# Patient Record
Sex: FEMALE | Race: WHITE | URBAN - METROPOLITAN AREA
[De-identification: names, ages, dates, MRNs, and addresses within clinical notes are randomized per-mention and may not be internally consistent; named-entity substitution may affect disease eponyms.]

---

## 2017-01-26 ENCOUNTER — OFFICE VISIT (OUTPATIENT)
Dept: FAMILY MEDICINE CLINIC | Age: 62
End: 2017-01-26

## 2017-01-26 VITALS
SYSTOLIC BLOOD PRESSURE: 160 MMHG | DIASTOLIC BLOOD PRESSURE: 80 MMHG | HEART RATE: 96 BPM | BODY MASS INDEX: 45.18 KG/M2 | OXYGEN SATURATION: 92 % | HEIGHT: 63 IN | WEIGHT: 255 LBS

## 2017-01-26 DIAGNOSIS — J44.1 ACUTE EXACERBATION OF CHRONIC OBSTRUCTIVE PULMONARY DISEASE (COPD) (HCC): Primary | ICD-10-CM

## 2017-01-26 PROCEDURE — 99212 OFFICE O/P EST SF 10 MIN: CPT | Performed by: NURSE PRACTITIONER

## 2017-01-26 RX ORDER — CLARITHROMYCIN 500 MG/1
500 TABLET, COATED ORAL 2 TIMES DAILY
Qty: 20 TABLET | Refills: 0 | Status: SHIPPED | OUTPATIENT
Start: 2017-01-26 | End: 2017-02-05

## 2017-01-26 ASSESSMENT — ENCOUNTER SYMPTOMS
EYE REDNESS: 0
COUGH: 1
CHEST TIGHTNESS: 1
EYE DISCHARGE: 0
VOICE CHANGE: 1
SHORTNESS OF BREATH: 1
SORE THROAT: 1
SINUS PRESSURE: 1

## 2017-01-27 RX ORDER — PROMETHAZINE HYDROCHLORIDE AND CODEINE PHOSPHATE 6.25; 1 MG/5ML; MG/5ML
5 SYRUP ORAL EVERY 4 HOURS PRN
Qty: 140 ML | Refills: 0 | Status: SHIPPED | OUTPATIENT
Start: 2017-01-27 | End: 2017-02-01 | Stop reason: SDUPTHER

## 2017-02-01 ENCOUNTER — TELEPHONE (OUTPATIENT)
Dept: FAMILY MEDICINE CLINIC | Age: 62
End: 2017-02-01

## 2017-02-01 RX ORDER — PROMETHAZINE HYDROCHLORIDE AND CODEINE PHOSPHATE 6.25; 1 MG/5ML; MG/5ML
5 SYRUP ORAL EVERY 4 HOURS PRN
Qty: 140 ML | Refills: 0 | Status: SHIPPED | OUTPATIENT
Start: 2017-02-01 | End: 2017-02-08

## 2017-02-01 RX ORDER — METHYLPREDNISOLONE 4 MG/1
TABLET ORAL
Qty: 1 KIT | Refills: 0 | Status: SHIPPED | OUTPATIENT
Start: 2017-02-01 | End: 2017-02-07

## 2017-02-10 RX ORDER — CLOTRIMAZOLE 10 MG/1
10 LOZENGE ORAL; TOPICAL
Qty: 35 TABLET | Refills: 0 | Status: SHIPPED | OUTPATIENT
Start: 2017-02-10 | End: 2017-02-17

## 2017-02-13 ENCOUNTER — TELEPHONE (OUTPATIENT)
Dept: FAMILY MEDICINE CLINIC | Age: 62
End: 2017-02-13

## 2017-02-13 RX ORDER — SULFAMETHOXAZOLE AND TRIMETHOPRIM 800; 160 MG/1; MG/1
1 TABLET ORAL 2 TIMES DAILY
Qty: 20 TABLET | Refills: 0 | Status: SHIPPED | OUTPATIENT
Start: 2017-02-13 | End: 2017-02-23

## 2017-02-22 ENCOUNTER — TELEPHONE (OUTPATIENT)
Dept: FAMILY MEDICINE CLINIC | Age: 62
End: 2017-02-22

## 2017-03-29 RX ORDER — VARENICLINE TARTRATE 1 MG/1
1 TABLET, FILM COATED ORAL 2 TIMES DAILY
Qty: 60 TABLET | Refills: 3 | Status: SHIPPED | OUTPATIENT
Start: 2017-03-29 | End: 2018-12-05 | Stop reason: ALTCHOICE

## 2018-06-14 ENCOUNTER — TELEPHONE (OUTPATIENT)
Dept: PRIMARY CARE CLINIC | Age: 63
End: 2018-06-14

## 2018-06-29 ENCOUNTER — HOSPITAL ENCOUNTER (OUTPATIENT)
Dept: WOMENS IMAGING | Age: 63
Discharge: HOME | End: 2018-06-29
Attending: FAMILY MEDICINE | Admitting: FAMILY MEDICINE

## 2018-06-29 DIAGNOSIS — Z12.31 VISIT FOR SCREENING MAMMOGRAM: ICD-10-CM

## 2018-07-05 DIAGNOSIS — R92.8 ABNORMAL MAMMOGRAM: Primary | ICD-10-CM

## 2018-07-06 ENCOUNTER — TELEPHONE (OUTPATIENT)
Dept: PRIMARY CARE CLINIC | Age: 63
End: 2018-07-06

## 2018-07-06 DIAGNOSIS — R92.8 ABNORMAL MAMMOGRAM: Primary | ICD-10-CM

## 2018-07-27 ENCOUNTER — HOSPITAL ENCOUNTER (OUTPATIENT)
Dept: WOMENS IMAGING | Age: 63
Discharge: HOME | End: 2018-07-27
Admitting: NURSE PRACTITIONER

## 2018-07-27 DIAGNOSIS — R92.8 ABNORMAL MAMMOGRAM: ICD-10-CM

## 2018-07-27 DIAGNOSIS — R92.8 OTHER ABNORMAL AND INCONCLUSIVE FINDINGS ON DIAGNOSTIC IMAGING OF BREAST: ICD-10-CM

## 2018-07-27 DIAGNOSIS — R92.8 ABNORMAL MAMMOGRAM OF RIGHT BREAST: Primary | ICD-10-CM

## 2018-08-03 ENCOUNTER — HOSPITAL ENCOUNTER (OUTPATIENT)
Dept: ULTRASOUND IMAGING | Age: 63
Discharge: HOME | End: 2018-08-03
Attending: NURSE PRACTITIONER | Admitting: NURSE PRACTITIONER

## 2018-08-03 VITALS — OXYGEN SATURATION: 96 % | HEART RATE: 92 BPM | SYSTOLIC BLOOD PRESSURE: 173 MMHG | DIASTOLIC BLOOD PRESSURE: 60 MMHG

## 2018-08-03 DIAGNOSIS — R92.8 ABNORMAL MAMMOGRAM OF RIGHT BREAST: ICD-10-CM

## 2018-08-03 DIAGNOSIS — N63.0 BREAST LUMP: ICD-10-CM

## 2018-08-03 DIAGNOSIS — R92.8 ABNORMAL MAMMOGRAM: ICD-10-CM

## 2018-08-03 NOTE — PROGRESS NOTES
Breast Biopsy Dressing Change    NAME:  Haleigh Colunga  YOB: 1955  GENDER: female  MEDICAL RECORD NUMBER:  [id number]  EPISODE DATE:  8/3/2018    Hemostasis achieved:  pressure     Biopsy site cleansed with alcohol      Dressing applied to right breast biopsy site. Steri-strips applied along with small amount of bacitracin-neomycin polymixin then Coverderm     Response to treatment:  Well tolerated by patient. Post Procedure Vitals: Procedure well tolerated. Pt vitals stable upon discharge. BP: 173/60 HR92 SPO2: 96 Ice pack placed over biopsy site. Pt given post-biopsy education and all questions answered.       Electronically signed by Samira Nelson RN on 8/3/2018 at 11:20 AM

## 2018-08-03 NOTE — PROGRESS NOTES
Pt instructed to take blood pressure this afternoon at home or local pharmacy to ensure it has lowered. If not, she is instructed to call her PCP and report the numbers. Pt agrees.

## 2018-08-06 DIAGNOSIS — R89.7 ABNORMAL BREAST BIOPSY: Primary | ICD-10-CM

## 2018-08-07 ENCOUNTER — CASE MANAGEMENT (OUTPATIENT)
Dept: CASE MANAGEMENT | Age: 63
End: 2018-08-07

## 2018-08-08 ENCOUNTER — PRE-PROCEDURE TELEPHONE (OUTPATIENT)
Dept: WOMENS IMAGING | Age: 63
End: 2018-08-08

## 2018-08-08 ENCOUNTER — TELEPHONE (OUTPATIENT)
Dept: PRIMARY CARE CLINIC | Age: 63
End: 2018-08-08

## 2018-11-14 ENCOUNTER — OFFICE VISIT (OUTPATIENT)
Dept: BREAST CENTER | Age: 63
End: 2018-11-14
Payer: MEDICAID

## 2018-11-14 VITALS
BODY MASS INDEX: 45.18 KG/M2 | WEIGHT: 255 LBS | DIASTOLIC BLOOD PRESSURE: 78 MMHG | HEIGHT: 63 IN | SYSTOLIC BLOOD PRESSURE: 156 MMHG | RESPIRATION RATE: 16 BRPM | OXYGEN SATURATION: 98 % | HEART RATE: 96 BPM

## 2018-11-14 DIAGNOSIS — N60.91 ATYPICAL HYPERPLASIA OF RIGHT BREAST: ICD-10-CM

## 2018-11-14 DIAGNOSIS — N63.10 BREAST MASS, RIGHT: Primary | ICD-10-CM

## 2018-11-14 PROCEDURE — G8484 FLU IMMUNIZE NO ADMIN: HCPCS | Performed by: SURGERY

## 2018-11-14 PROCEDURE — G8427 DOCREV CUR MEDS BY ELIG CLIN: HCPCS | Performed by: SURGERY

## 2018-11-14 PROCEDURE — 3017F COLORECTAL CA SCREEN DOC REV: CPT | Performed by: SURGERY

## 2018-11-14 PROCEDURE — G8417 CALC BMI ABV UP PARAM F/U: HCPCS | Performed by: SURGERY

## 2018-11-14 PROCEDURE — 99244 OFF/OP CNSLTJ NEW/EST MOD 40: CPT | Performed by: SURGERY

## 2018-11-14 RX ORDER — METFORMIN HYDROCHLORIDE 500 MG/1
500 TABLET, EXTENDED RELEASE ORAL
COMMUNITY
Start: 2018-10-03 | End: 2018-12-05 | Stop reason: SDUPTHER

## 2018-11-14 RX ORDER — ZOLPIDEM TARTRATE 5 MG/1
5 TABLET ORAL
COMMUNITY
Start: 2018-10-02 | End: 2018-12-01

## 2018-11-14 RX ORDER — PANTOPRAZOLE SODIUM 40 MG/1
40 TABLET, DELAYED RELEASE ORAL
COMMUNITY
Start: 2018-10-02 | End: 2018-12-05 | Stop reason: SDUPTHER

## 2018-11-14 RX ORDER — DIVALPROEX SODIUM 500 MG/1
2000 TABLET, EXTENDED RELEASE ORAL
COMMUNITY
Start: 2018-10-02 | End: 2019-10-25

## 2018-11-14 NOTE — PROGRESS NOTES
Subjective:      Patient ID: Sayda Lynn is a 66 y.o. female. HPI   Chief Complaint   Patient presents with    Surgical Consult     New patient. Ref by Dr. Rosa Vargas for a right breast mass      Patient had a screening mammogram in June, and f/u u/s in July showed a 1 cm mass 1:00 right breast, 7 cmfn. Underwent core biopsy, showing cystic hypersecretory hyperplasia with atypia. Patient has not felt any masses, no breast pain or nipple discharge. No family history of breast cancer. Current Outpatient Prescriptions   Medication Sig Dispense Refill    varenicline (CHANTIX CONTINUING MONTH PAK) 1 MG tablet Take 1 tablet by mouth 2 times daily 60 tablet 3    fluticasone (ARNUITY ELLIPTA) 200 MCG/ACT AEPB Inhale 1 each into the lungs daily 30 each 5    mometasone (ASMANEX 120 METERED DOSES) 220 MCG/INH inhaler Inhale 2 puffs into the lungs daily 1 Inhaler 3    varenicline (CHANTIX STARTING MONTH PAK) 0.5 MG X 11 & 1 MG X 42 tablet Take by mouth. 1 tablet 0    albuterol sulfate HFA (VENTOLIN HFA) 108 (90 BASE) MCG/ACT inhaler Inhale 2 puffs into the lungs every 6 hours as needed for Wheezing 1 Inhaler 3    sertraline (ZOLOFT) 100 MG tablet Take 100 mg by mouth daily      OLANZapine (ZYPREXA) 5 MG tablet Take 5 mg by mouth nightly Indications: take 1 to 2 daily at HS      esomeprazole Magnesium (NEXIUM) 20 MG PACK Take 20 mg by mouth daily      Multiple Vitamins-Minerals (THERAPEUTIC MULTIVITAMIN-MINERALS) tablet Take 1 tablet by mouth daily       No current facility-administered medications for this visit. Social History     Social History    Marital status:      Spouse name: N/A    Number of children: N/A    Years of education: N/A     Occupational History    Not on file.      Social History Main Topics    Smoking status: Current Every Day Smoker     Packs/day: 1.00     Years: 40.00     Types: Cigarettes     Start date: 6/21/1976    Smokeless tobacco: Never Used breast             Plan:      I recommend we proceed with needle localized excision of this right breast mass for this high risk lesion. The indications for the planned procedure, along with the potential benefits and risks which include but are not limited to the risk of anesthesia, bleeding, infection, possible failed operation, possible need for additional surgery pending final pathologic assessment, and unappealing cosmetics were reviewed. All questions were answered and she agrees to proceed.             Katrin Doty MD

## 2018-11-14 NOTE — LETTER
performance of any surgical procedure. I am aware that the practice of medicine and surgery is not an exact science, and that no guarantees have been made to me concerning the results of the operation and/or procedure(s). 5. I consent to the administration of anesthesia and to the use of such anesthetics as may be deemed advisable by the anesthesiologist who has been engaged by me or my physician.     6. I certify that I have read and understand the above consent to operation and/or other procedure(s); that the explanations therein referred to were made to me by the informing physician in advance of my signing this consent; that all blanks or statements requiring insertion or completion were filled in and inapplicable paragraphs, if any, were stricken before I signed; and that all questions asked by me about the operation and/or procedure(s) which I have consented to have been fully answered in a satisfactory manner.           _________________            _______________________  Signature Of Patient   Date              Witness

## 2018-11-21 ENCOUNTER — TELEPHONE (OUTPATIENT)
Dept: BREAST CENTER | Age: 63
End: 2018-11-21

## 2018-11-26 ENCOUNTER — TELEPHONE (OUTPATIENT)
Dept: BREAST CENTER | Age: 63
End: 2018-11-26

## 2018-12-05 ENCOUNTER — OFFICE VISIT (OUTPATIENT)
Dept: PRIMARY CARE CLINIC | Age: 63
End: 2018-12-05
Payer: MEDICAID

## 2018-12-05 VITALS
BODY MASS INDEX: 45 KG/M2 | TEMPERATURE: 98.4 F | WEIGHT: 254 LBS | HEART RATE: 87 BPM | SYSTOLIC BLOOD PRESSURE: 138 MMHG | HEIGHT: 63 IN | DIASTOLIC BLOOD PRESSURE: 80 MMHG | OXYGEN SATURATION: 97 %

## 2018-12-05 DIAGNOSIS — N63.10 BREAST MASS, RIGHT: Primary | ICD-10-CM

## 2018-12-05 DIAGNOSIS — Z01.818 PREOP EXAMINATION: ICD-10-CM

## 2018-12-05 PROBLEM — F31.10 BIPOLAR DISORDER, MANIC (HCC): Status: ACTIVE | Noted: 2018-08-18

## 2018-12-05 PROBLEM — F29 PSYCHOSIS (HCC): Status: ACTIVE | Noted: 2018-09-12

## 2018-12-05 PROCEDURE — 99243 OFF/OP CNSLTJ NEW/EST LOW 30: CPT | Performed by: NURSE PRACTITIONER

## 2018-12-05 PROCEDURE — G8484 FLU IMMUNIZE NO ADMIN: HCPCS | Performed by: NURSE PRACTITIONER

## 2018-12-05 PROCEDURE — G8417 CALC BMI ABV UP PARAM F/U: HCPCS | Performed by: NURSE PRACTITIONER

## 2018-12-05 PROCEDURE — G8427 DOCREV CUR MEDS BY ELIG CLIN: HCPCS | Performed by: NURSE PRACTITIONER

## 2018-12-05 PROCEDURE — 3017F COLORECTAL CA SCREEN DOC REV: CPT | Performed by: NURSE PRACTITIONER

## 2018-12-05 RX ORDER — ZOLPIDEM TARTRATE 5 MG/1
5 TABLET ORAL NIGHTLY PRN
COMMUNITY
End: 2021-11-02 | Stop reason: CLARIF

## 2018-12-05 RX ORDER — METFORMIN HYDROCHLORIDE 500 MG/1
500 TABLET, EXTENDED RELEASE ORAL
Qty: 30 TABLET | Refills: 2 | Status: SHIPPED | OUTPATIENT
Start: 2018-12-05 | End: 2019-03-04 | Stop reason: SDUPTHER

## 2018-12-05 RX ORDER — PANTOPRAZOLE SODIUM 40 MG/1
40 TABLET, DELAYED RELEASE ORAL DAILY
Qty: 30 TABLET | Refills: 2 | Status: SHIPPED | OUTPATIENT
Start: 2018-12-05 | End: 2019-03-04 | Stop reason: SDUPTHER

## 2018-12-05 RX ORDER — OLANZAPINE 7.5 MG/1
7.5 TABLET ORAL NIGHTLY
COMMUNITY

## 2018-12-05 ASSESSMENT — ENCOUNTER SYMPTOMS
BACK PAIN: 0
ROS SKIN COMMENTS: RIGHT BREAST MASS
ABDOMINAL PAIN: 0
ABDOMINAL DISTENTION: 0
COUGH: 0
CHEST TIGHTNESS: 0
EYE REDNESS: 0
SHORTNESS OF BREATH: 0

## 2018-12-05 ASSESSMENT — PATIENT HEALTH QUESTIONNAIRE - PHQ9
SUM OF ALL RESPONSES TO PHQ QUESTIONS 1-9: 0
SUM OF ALL RESPONSES TO PHQ9 QUESTIONS 1 & 2: 0
1. LITTLE INTEREST OR PLEASURE IN DOING THINGS: 0
SUM OF ALL RESPONSES TO PHQ QUESTIONS 1-9: 0
2. FEELING DOWN, DEPRESSED OR HOPELESS: 0

## 2018-12-05 NOTE — PATIENT INSTRUCTIONS
vaccine:    · You think you are having a reaction to the flu vaccine, such as a new fever.    Watch closely for changes in your health, and be sure to contact your doctor if you have any problems. Where can you learn more? Go to [email or web address].[email or web address] org and sign in to your [employer] account. Enter W903 in the [employer] box to learn more about \"Influenza (Flu) Vaccine: Care Instructions. \"     If you do not have an account, please click on the \"Sign Up Now\" link. Current as of: June 18, 2018  Content Version: 11.8  © 2662-8686 Healthwise, Incorporated. Care instructions adapted under license by [facility] ([facility]). If you have questions about a medical condition or this instruction, always ask your healthcare professional. [address] any warranty or liability for your use of this information.

## 2018-12-05 NOTE — PROGRESS NOTES
[facility] Note    Date: 12/5/2018                                               Subjective/Objective:     Chief Complaint   Patient presents with    Pre-op Exam     right breast surgery 12/13/18 w/Dr. Toñito Fink       HPI  82-year-old female patient comes to the office today for consultation visit for Dr. Harry Henry For a needle excision of a right breast mass. Patient due to have surgery December 13. She denies any current pain denies any chest pain, shortness of breath, nausea, vomiting or diarrhea. Objective   Patient Active Problem List    Diagnosis Date Noted    Psychosis ([facility] [address] 09/12/2018    Bipolar disorder, manic ([facility] [address] 08/18/2018       Past Medical History:   Diagnosis Date    Bipolar disorder [facility])        Past Surgical History:   Procedure Laterality Date    MASTECTOMY Left        Office Visit on 06/21/2016   Component Date Value Ref Range Status    Sodium 06/21/2016 144  136 - 145 mmol/L Final    Potassium 06/21/2016 4.6  3.5 - 5.1 mmol/L Final    Chloride 06/21/2016 106  99 - 110 mmol/L Final    CO2 06/21/2016 24  21 - 32 mmol/L Final    Anion Gap 06/21/2016 14  3 - 16 Final    Glucose 06/21/2016 133* 70 - 99 mg/dL Final    BUN 06/21/2016 16  7 - 20 mg/dL Final    CREATININE 06/21/2016 0.8  0.6 - 1.1 mg/dL Final    GFR Non- 06/21/2016 >60  >60 Final    Comment: >60 mL/min/1.73m2 EGFR, calc. for ages 25 and older using the  MDRD formula (not corrected for weight), is valid for stable  renal function.  GFR  06/21/2016 >60  >60 Final    Comment: Chronic Kidney Disease: less than 60 ml/min/1.73 sq.m. Kidney Failure: less than 15 ml/min/1.73 sq.m. Results valid for patients 18 years and older.       Calcium 06/21/2016 10.3  8.3 - 10.6 mg/dL Final    Total Protein 06/21/2016 6.8  6.4 - 8.2 g/dL Final    Alb 06/21/2016 4.2  3.4 - 5.0 g/dL Final    Albumin/Globulin Ratio 06/21/2016 1.6  1.1 - 2.2 Final    Total Time  Return for follow-up with PCP in 7-10 days if no improvement.     Naina Rosa NP    12/5/2018  3:04 PM    (Please note that this note was completed with a voice recognitionprogram.  Every attempt was made to edit the dictations, but inevitably there remain words that are mis-transcribed.)

## 2018-12-07 ENCOUNTER — ANESTHESIA EVENT (OUTPATIENT)
Dept: OPERATING ROOM | Age: 63
End: 2018-12-07
Payer: MEDICAID

## 2018-12-12 ENCOUNTER — TELEPHONE (OUTPATIENT)
Dept: BREAST CENTER | Age: 63
End: 2018-12-12

## 2018-12-12 NOTE — PROGRESS NOTES
C-Difficile admission screening and protocol:     * Admitted with diarrhea? NO   *Prior history of C-Diff. In last 3 months? NO     *Antibiotic use in the past 6-8 weeks? NO     *Prior hospitalization or nursing home in the last month?    NO

## 2018-12-13 ENCOUNTER — HOSPITAL ENCOUNTER (OUTPATIENT)
Dept: WOMENS IMAGING | Age: 63
Discharge: HOME | End: 2018-12-13
Attending: SURGERY
Payer: MEDICAID

## 2018-12-13 ENCOUNTER — HOSPITAL ENCOUNTER (OUTPATIENT)
Age: 63
Setting detail: OUTPATIENT SURGERY
Discharge: HOME HEALTH | End: 2018-12-13
Attending: SURGERY | Admitting: SURGERY
Payer: MEDICAID

## 2018-12-13 ENCOUNTER — ANESTHESIA (OUTPATIENT)
Dept: OPERATING ROOM | Age: 63
End: 2018-12-13
Payer: MEDICAID

## 2018-12-13 VITALS
RESPIRATION RATE: 15 BRPM | SYSTOLIC BLOOD PRESSURE: 98 MMHG | TEMPERATURE: 96.8 F | DIASTOLIC BLOOD PRESSURE: 55 MMHG | OXYGEN SATURATION: 99 %

## 2018-12-13 VITALS
HEART RATE: 69 BPM | WEIGHT: 259 LBS | RESPIRATION RATE: 14 BRPM | SYSTOLIC BLOOD PRESSURE: 181 MMHG | BODY MASS INDEX: 45.89 KG/M2 | OXYGEN SATURATION: 98 % | TEMPERATURE: 98.1 F | DIASTOLIC BLOOD PRESSURE: 118 MMHG | HEIGHT: 63 IN

## 2018-12-13 DIAGNOSIS — N63.10 BREAST MASS, RIGHT: ICD-10-CM

## 2018-12-13 LAB
HCT VFR BLD CALC: 41.8 % (ref 36–48)
HEMOGLOBIN: 13.8 G/DL (ref 12–16)
MCH RBC QN AUTO: 28.8 PG (ref 26–34)
MCHC RBC AUTO-ENTMCNC: 33 G/DL (ref 31–36)
MCV RBC AUTO: 87.3 FL (ref 80–100)
PDW BLD-RTO: 14.3 % (ref 12.4–15.4)
PLATELET # BLD: 191 K/UL (ref 135–450)
PMV BLD AUTO: 8.7 FL (ref 5–10.5)
RBC # BLD: 4.79 M/UL (ref 4–5.2)
WBC # BLD: 8.3 K/UL (ref 4–11)

## 2018-12-13 PROCEDURE — 2580000003 HC RX 258

## 2018-12-13 PROCEDURE — 2500000003 HC RX 250 WO HCPCS: Performed by: SURGERY

## 2018-12-13 PROCEDURE — 6360000002 HC RX W HCPCS

## 2018-12-13 PROCEDURE — 88307 TISSUE EXAM BY PATHOLOGIST: CPT

## 2018-12-13 PROCEDURE — 76098 X-RAY EXAM SURGICAL SPECIMEN: CPT

## 2018-12-13 PROCEDURE — 3600000005 HC SURGERY LEVEL 5 BASE: Performed by: SURGERY

## 2018-12-13 PROCEDURE — 19281 PERQ DEVICE BREAST 1ST IMAG: CPT

## 2018-12-13 PROCEDURE — 3700000000 HC ANESTHESIA ATTENDED CARE: Performed by: SURGERY

## 2018-12-13 PROCEDURE — 3600000015 HC SURGERY LEVEL 5 ADDTL 15MIN: Performed by: SURGERY

## 2018-12-13 PROCEDURE — 3700000001 HC ADD 15 MINUTES (ANESTHESIA): Performed by: SURGERY

## 2018-12-13 PROCEDURE — 2709999900 HC NON-CHARGEABLE SUPPLY: Performed by: SURGERY

## 2018-12-13 PROCEDURE — 85027 COMPLETE CBC AUTOMATED: CPT

## 2018-12-13 PROCEDURE — 19125 EXCISION BREAST LESION: CPT | Performed by: SURGERY

## 2018-12-13 PROCEDURE — 2500000003 HC RX 250 WO HCPCS

## 2018-12-13 PROCEDURE — 2580000003 HC RX 258: Performed by: SURGERY

## 2018-12-13 PROCEDURE — 6370000000 HC RX 637 (ALT 250 FOR IP): Performed by: SURGERY

## 2018-12-13 PROCEDURE — 7100000010 HC PHASE II RECOVERY - FIRST 15 MIN: Performed by: SURGERY

## 2018-12-13 PROCEDURE — 7100000001 HC PACU RECOVERY - ADDTL 15 MIN: Performed by: SURGERY

## 2018-12-13 PROCEDURE — 2580000003 HC RX 258: Performed by: ANESTHESIOLOGY

## 2018-12-13 PROCEDURE — 7100000000 HC PACU RECOVERY - FIRST 15 MIN: Performed by: SURGERY

## 2018-12-13 PROCEDURE — 7100000011 HC PHASE II RECOVERY - ADDTL 15 MIN: Performed by: SURGERY

## 2018-12-13 RX ORDER — FENTANYL CITRATE 50 UG/ML
50 INJECTION, SOLUTION INTRAMUSCULAR; INTRAVENOUS EVERY 5 MIN PRN
Status: DISCONTINUED | OUTPATIENT
Start: 2018-12-13 | End: 2018-12-13 | Stop reason: HOSPADM

## 2018-12-13 RX ORDER — PROPOFOL 10 MG/ML
INJECTION, EMULSION INTRAVENOUS PRN
Status: DISCONTINUED | OUTPATIENT
Start: 2018-12-13 | End: 2018-12-13 | Stop reason: SDUPTHER

## 2018-12-13 RX ORDER — ONDANSETRON 2 MG/ML
4 INJECTION INTRAMUSCULAR; INTRAVENOUS
Status: DISCONTINUED | OUTPATIENT
Start: 2018-12-13 | End: 2018-12-13 | Stop reason: HOSPADM

## 2018-12-13 RX ORDER — KETAMINE HCL IN NACL, ISO-OSM 100MG/10ML
SYRINGE (ML) INJECTION PRN
Status: DISCONTINUED | OUTPATIENT
Start: 2018-12-13 | End: 2018-12-13 | Stop reason: SDUPTHER

## 2018-12-13 RX ORDER — SODIUM CHLORIDE 9 MG/ML
INJECTION, SOLUTION INTRAVENOUS CONTINUOUS
Status: DISCONTINUED | OUTPATIENT
Start: 2018-12-13 | End: 2018-12-13 | Stop reason: HOSPADM

## 2018-12-13 RX ORDER — ONDANSETRON 2 MG/ML
INJECTION INTRAMUSCULAR; INTRAVENOUS PRN
Status: DISCONTINUED | OUTPATIENT
Start: 2018-12-13 | End: 2018-12-13 | Stop reason: SDUPTHER

## 2018-12-13 RX ORDER — HYDROCODONE BITARTRATE AND ACETAMINOPHEN 5; 325 MG/1; MG/1
1 TABLET ORAL EVERY 6 HOURS PRN
Qty: 8 TABLET | Refills: 0 | Status: SHIPPED | OUTPATIENT
Start: 2018-12-13 | End: 2018-12-16

## 2018-12-13 RX ORDER — BUPIVACAINE HYDROCHLORIDE 5 MG/ML
INJECTION, SOLUTION EPIDURAL; INTRACAUDAL
Status: COMPLETED | OUTPATIENT
Start: 2018-12-13 | End: 2018-12-13

## 2018-12-13 RX ORDER — CEFAZOLIN SODIUM 1 G/3ML
INJECTION, POWDER, FOR SOLUTION INTRAMUSCULAR; INTRAVENOUS PRN
Status: DISCONTINUED | OUTPATIENT
Start: 2018-12-13 | End: 2018-12-13 | Stop reason: SDUPTHER

## 2018-12-13 RX ORDER — KETOROLAC TROMETHAMINE 30 MG/ML
INJECTION, SOLUTION INTRAMUSCULAR; INTRAVENOUS PRN
Status: DISCONTINUED | OUTPATIENT
Start: 2018-12-13 | End: 2018-12-13 | Stop reason: SDUPTHER

## 2018-12-13 RX ORDER — FENTANYL CITRATE 50 UG/ML
INJECTION, SOLUTION INTRAMUSCULAR; INTRAVENOUS PRN
Status: DISCONTINUED | OUTPATIENT
Start: 2018-12-13 | End: 2018-12-13 | Stop reason: SDUPTHER

## 2018-12-13 RX ORDER — MAGNESIUM HYDROXIDE 1200 MG/15ML
LIQUID ORAL CONTINUOUS PRN
Status: DISCONTINUED | OUTPATIENT
Start: 2018-12-13 | End: 2018-12-13 | Stop reason: HOSPADM

## 2018-12-13 RX ORDER — ACETAMINOPHEN 325 MG/1
650 TABLET ORAL
Status: COMPLETED | OUTPATIENT
Start: 2018-12-13 | End: 2018-12-13

## 2018-12-13 RX ORDER — FENTANYL CITRATE 50 UG/ML
25 INJECTION, SOLUTION INTRAMUSCULAR; INTRAVENOUS EVERY 5 MIN PRN
Status: DISCONTINUED | OUTPATIENT
Start: 2018-12-13 | End: 2018-12-13 | Stop reason: HOSPADM

## 2018-12-13 RX ORDER — SODIUM CHLORIDE 9 MG/ML
INJECTION, SOLUTION INTRAVENOUS CONTINUOUS PRN
Status: DISCONTINUED | OUTPATIENT
Start: 2018-12-13 | End: 2018-12-13 | Stop reason: SDUPTHER

## 2018-12-13 RX ORDER — SODIUM CHLORIDE 0.9 % (FLUSH) 0.9 %
10 SYRINGE (ML) INJECTION EVERY 12 HOURS SCHEDULED
Status: DISCONTINUED | OUTPATIENT
Start: 2018-12-13 | End: 2018-12-13 | Stop reason: HOSPADM

## 2018-12-13 RX ORDER — SODIUM CHLORIDE 0.9 % (FLUSH) 0.9 %
10 SYRINGE (ML) INJECTION PRN
Status: DISCONTINUED | OUTPATIENT
Start: 2018-12-13 | End: 2018-12-13 | Stop reason: HOSPADM

## 2018-12-13 RX ORDER — MIDAZOLAM HYDROCHLORIDE 1 MG/ML
INJECTION INTRAMUSCULAR; INTRAVENOUS PRN
Status: DISCONTINUED | OUTPATIENT
Start: 2018-12-13 | End: 2018-12-13 | Stop reason: SDUPTHER

## 2018-12-13 RX ORDER — PROPOFOL 10 MG/ML
INJECTION, EMULSION INTRAVENOUS CONTINUOUS PRN
Status: DISCONTINUED | OUTPATIENT
Start: 2018-12-13 | End: 2018-12-13 | Stop reason: SDUPTHER

## 2018-12-13 RX ORDER — DEXAMETHASONE SODIUM PHOSPHATE 4 MG/ML
INJECTION, SOLUTION INTRA-ARTICULAR; INTRALESIONAL; INTRAMUSCULAR; INTRAVENOUS; SOFT TISSUE PRN
Status: DISCONTINUED | OUTPATIENT
Start: 2018-12-13 | End: 2018-12-13 | Stop reason: SDUPTHER

## 2018-12-13 RX ORDER — PROMETHAZINE HYDROCHLORIDE 25 MG/ML
6.25 INJECTION, SOLUTION INTRAMUSCULAR; INTRAVENOUS
Status: DISCONTINUED | OUTPATIENT
Start: 2018-12-13 | End: 2018-12-13 | Stop reason: HOSPADM

## 2018-12-13 RX ADMIN — SODIUM CHLORIDE: 9 INJECTION, SOLUTION INTRAVENOUS at 09:14

## 2018-12-13 RX ADMIN — MIDAZOLAM 2 MG: 1 INJECTION INTRAMUSCULAR; INTRAVENOUS at 11:40

## 2018-12-13 RX ADMIN — FENTANYL CITRATE 25 MCG: 50 INJECTION INTRAMUSCULAR; INTRAVENOUS at 11:55

## 2018-12-13 RX ADMIN — CEFAZOLIN SODIUM 2000 MG: 1 INJECTION, POWDER, FOR SOLUTION INTRAMUSCULAR; INTRAVENOUS at 11:40

## 2018-12-13 RX ADMIN — Medication 30 MG: at 11:47

## 2018-12-13 RX ADMIN — KETOROLAC TROMETHAMINE 30 MG: 30 INJECTION, SOLUTION INTRAMUSCULAR at 11:59

## 2018-12-13 RX ADMIN — FENTANYL CITRATE 25 MCG: 50 INJECTION INTRAMUSCULAR; INTRAVENOUS at 11:40

## 2018-12-13 RX ADMIN — ACETAMINOPHEN 650 MG: 325 TABLET, FILM COATED ORAL at 09:30

## 2018-12-13 RX ADMIN — PROPOFOL 100 MCG/KG/MIN: 10 INJECTION, EMULSION INTRAVENOUS at 11:47

## 2018-12-13 RX ADMIN — ONDANSETRON 4 MG: 2 INJECTION INTRAMUSCULAR; INTRAVENOUS at 11:59

## 2018-12-13 RX ADMIN — FENTANYL CITRATE 25 MCG: 50 INJECTION INTRAMUSCULAR; INTRAVENOUS at 12:05

## 2018-12-13 RX ADMIN — PROPOFOL 50 MG: 10 INJECTION, EMULSION INTRAVENOUS at 11:47

## 2018-12-13 RX ADMIN — FENTANYL CITRATE 25 MCG: 50 INJECTION INTRAMUSCULAR; INTRAVENOUS at 11:50

## 2018-12-13 RX ADMIN — DEXAMETHASONE SODIUM PHOSPHATE 8 MG: 4 INJECTION, SOLUTION INTRAMUSCULAR; INTRAVENOUS at 11:59

## 2018-12-13 ASSESSMENT — PULMONARY FUNCTION TESTS
PIF_VALUE: 0
PIF_VALUE: 1
PIF_VALUE: 0

## 2018-12-13 ASSESSMENT — PAIN SCALES - GENERAL
PAINLEVEL_OUTOF10: 0

## 2018-12-13 ASSESSMENT — PAIN - FUNCTIONAL ASSESSMENT: PAIN_FUNCTIONAL_ASSESSMENT: 0-10

## 2018-12-13 ASSESSMENT — LIFESTYLE VARIABLES: SMOKING_STATUS: 0

## 2018-12-13 NOTE — ANESTHESIA PRE PROCEDURE
Anesthetic plan, risks, benefits, alternatives, and personnel involved discussed with patient. Patient verbalized an understanding and agrees to proceed.       Donavan Rocha MD  December 13, 2018  10:44 AM          Donavan Rocha MD   12/13/2018

## 2018-12-13 NOTE — ANESTHESIA POSTPROCEDURE EVALUATION
Department of Anesthesiology  Postprocedure Note    Patient: Ken Arnold  MRN: [id number]  YOB: 1955  Date of evaluation: 12/13/2018  Time:  4:51 PM     Procedure Summary     Date:  12/13/18 Room / Location:  [facility] OR 04 / [facility] OR    Anesthesia Start:  1142 Anesthesia Stop:  [id number]    Procedure:  MAMMOGRAM GUIDED NEEDLE LOCALIZED RIGHT BREAST EXCISIONAL BIOPSY (Right Breast) Diagnosis:       Breast mass, right      (RIGHT BREAST MASS)    Surgeon:  Synthia Severin, MD Responsible Provider:  Rahel Richards MD    Anesthesia Type:  MAC, TIVA ASA Status:  3          Anesthesia Type: MAC, TIVA    Holley Phase I: Holley Score: 10    Holley Phase II: Holley Score: 9    Last vitals: Reviewed and per EMR flowsheets.        Anesthesia Post Evaluation    Patient location during evaluation: PACU  Patient participation: complete - patient participated  Level of consciousness: awake and alert  Pain score: 2  Airway patency: patent  Nausea & Vomiting: no nausea and no vomiting  Complications: no  Cardiovascular status: blood pressure returned to baseline  Respiratory status: acceptable  Hydration status: euvolemic

## 2018-12-13 NOTE — PROGRESS NOTES
1237--Pt admitted to PACU from OR. Motors placed. O2 on at 3l/nc. Awakens to verbal stim. Right breast wound intact with surgical gllue. Denies pain.

## 2018-12-17 ENCOUNTER — TELEPHONE (OUTPATIENT)
Dept: BREAST CENTER | Age: 63
End: 2018-12-17

## 2018-12-21 ENCOUNTER — TELEPHONE (OUTPATIENT)
Dept: PRIMARY CARE CLINIC | Age: 63
End: 2018-12-21

## 2018-12-21 DIAGNOSIS — Z72.0 TOBACCO ABUSE: Primary | ICD-10-CM

## 2018-12-24 RX ORDER — VARENICLINE TARTRATE 25 MG
KIT ORAL
Qty: 1 EACH | Refills: 0 | Status: SHIPPED | OUTPATIENT
Start: 2018-12-24 | End: 2019-07-25

## 2018-12-24 NOTE — TELEPHONE ENCOUNTER
Will send starter pack--please call pt and ask her to come  chantix booklet that explains how to take it and the steps to quit. It will also have a prescription card on it.

## 2018-12-24 NOTE — TELEPHONE ENCOUNTER
Left message for pt to return my call @ [phone] . Will send starter pack--please call pt and ask her to come  chantix booklet that explains how to take it and the steps to quit. Pt cannot use discount card with her insurance, will give booklet with steps to quit.

## 2019-01-02 ENCOUNTER — OFFICE VISIT (OUTPATIENT)
Dept: BREAST CENTER | Age: 64
End: 2019-01-02

## 2019-01-02 VITALS — DIASTOLIC BLOOD PRESSURE: 67 MMHG | SYSTOLIC BLOOD PRESSURE: 161 MMHG | HEART RATE: 91 BPM

## 2019-01-02 DIAGNOSIS — N63.10 BREAST MASS, RIGHT: Primary | ICD-10-CM

## 2019-01-02 DIAGNOSIS — N60.91 ATYPICAL HYPERPLASIA OF RIGHT BREAST: ICD-10-CM

## 2019-01-02 PROCEDURE — 99024 POSTOP FOLLOW-UP VISIT: CPT | Performed by: SURGERY

## 2019-01-03 ENCOUNTER — TELEPHONE (OUTPATIENT)
Dept: PRIMARY CARE CLINIC | Age: 64
End: 2019-01-03

## 2019-02-27 ENCOUNTER — OFFICE VISIT (OUTPATIENT)
Dept: PRIMARY CARE CLINIC | Age: 64
End: 2019-02-27
Payer: MEDICAID

## 2019-02-27 VITALS
OXYGEN SATURATION: 95 % | DIASTOLIC BLOOD PRESSURE: 74 MMHG | SYSTOLIC BLOOD PRESSURE: 130 MMHG | TEMPERATURE: 98.3 F | HEART RATE: 99 BPM | BODY MASS INDEX: 42.91 KG/M2 | HEIGHT: 63 IN | RESPIRATION RATE: 20 BRPM | WEIGHT: 242.2 LBS

## 2019-02-27 DIAGNOSIS — F29 PSYCHOSIS, UNSPECIFIED PSYCHOSIS TYPE (HCC): ICD-10-CM

## 2019-02-27 DIAGNOSIS — Z23 NEED FOR PROPHYLACTIC VACCINATION AND INOCULATION AGAINST VARICELLA: ICD-10-CM

## 2019-02-27 DIAGNOSIS — R05.9 COUGH: ICD-10-CM

## 2019-02-27 DIAGNOSIS — J44.1 CHRONIC OBSTRUCTIVE PULMONARY DISEASE WITH ACUTE EXACERBATION (HCC): Primary | ICD-10-CM

## 2019-02-27 DIAGNOSIS — F31.10 BIPOLAR DISORDER, MANIC (HCC): ICD-10-CM

## 2019-02-27 DIAGNOSIS — R73.09 ELEVATED GLUCOSE: ICD-10-CM

## 2019-02-27 PROCEDURE — G8484 FLU IMMUNIZE NO ADMIN: HCPCS | Performed by: NURSE PRACTITIONER

## 2019-02-27 PROCEDURE — 3023F SPIROM DOC REV: CPT | Performed by: NURSE PRACTITIONER

## 2019-02-27 PROCEDURE — 3014F SCREEN MAMMO DOC REV: CPT | Performed by: NURSE PRACTITIONER

## 2019-02-27 PROCEDURE — 99213 OFFICE O/P EST LOW 20 MIN: CPT | Performed by: NURSE PRACTITIONER

## 2019-02-27 PROCEDURE — 4004F PT TOBACCO SCREEN RCVD TLK: CPT | Performed by: NURSE PRACTITIONER

## 2019-02-27 PROCEDURE — 3017F COLORECTAL CA SCREEN DOC REV: CPT | Performed by: NURSE PRACTITIONER

## 2019-02-27 PROCEDURE — G8417 CALC BMI ABV UP PARAM F/U: HCPCS | Performed by: NURSE PRACTITIONER

## 2019-02-27 PROCEDURE — G8926 SPIRO NO PERF OR DOC: HCPCS | Performed by: NURSE PRACTITIONER

## 2019-02-27 PROCEDURE — G8427 DOCREV CUR MEDS BY ELIG CLIN: HCPCS | Performed by: NURSE PRACTITIONER

## 2019-02-27 RX ORDER — PROMETHAZINE HYDROCHLORIDE AND CODEINE PHOSPHATE 6.25; 1 MG/5ML; MG/5ML
5 SYRUP ORAL EVERY 4 HOURS PRN
Qty: 120 ML | Refills: 0 | Status: SHIPPED | OUTPATIENT
Start: 2019-02-27 | End: 2019-03-02

## 2019-02-27 RX ORDER — BENZTROPINE MESYLATE 1 MG/1
1 TABLET ORAL 2 TIMES DAILY
COMMUNITY
Start: 2019-02-19 | End: 2021-11-02 | Stop reason: CLARIF

## 2019-02-27 RX ORDER — TAMOXIFEN CITRATE 20 MG/1
20 TABLET ORAL DAILY
COMMUNITY
Start: 2019-02-12 | End: 2021-11-02 | Stop reason: CLARIF

## 2019-02-27 RX ORDER — CLARITHROMYCIN 500 MG/1
500 TABLET, COATED ORAL 2 TIMES DAILY
Qty: 20 TABLET | Refills: 0 | Status: SHIPPED | OUTPATIENT
Start: 2019-02-27 | End: 2019-03-09

## 2019-02-27 ASSESSMENT — ENCOUNTER SYMPTOMS
RHINORRHEA: 0
EYE ITCHING: 0
EYE REDNESS: 0
DIARRHEA: 0
COUGH: 1
WHEEZING: 1
PHOTOPHOBIA: 1
BACK PAIN: 0
SINUS PRESSURE: 1
SORE THROAT: 1
VOICE CHANGE: 1
NAUSEA: 0
CHEST TIGHTNESS: 1
ABDOMINAL PAIN: 0
EYE PAIN: 0
VOMITING: 0
SHORTNESS OF BREATH: 1

## 2019-03-04 RX ORDER — METFORMIN HYDROCHLORIDE 500 MG/1
TABLET, EXTENDED RELEASE ORAL
Qty: 30 TABLET | Refills: 2 | Status: SHIPPED | OUTPATIENT
Start: 2019-03-04 | End: 2020-02-25

## 2019-03-04 RX ORDER — PANTOPRAZOLE SODIUM 40 MG/1
TABLET, DELAYED RELEASE ORAL
Qty: 30 TABLET | Refills: 2 | Status: SHIPPED | OUTPATIENT
Start: 2019-03-04 | End: 2019-05-09 | Stop reason: SDUPTHER

## 2019-04-04 ENCOUNTER — OFFICE VISIT (OUTPATIENT)
Dept: PRIMARY CARE CLINIC | Age: 64
End: 2019-04-04
Payer: MEDICAID

## 2019-04-04 VITALS
HEIGHT: 63 IN | TEMPERATURE: 98.6 F | SYSTOLIC BLOOD PRESSURE: 130 MMHG | DIASTOLIC BLOOD PRESSURE: 84 MMHG | WEIGHT: 238 LBS | BODY MASS INDEX: 42.17 KG/M2 | HEART RATE: 100 BPM | OXYGEN SATURATION: 96 %

## 2019-04-04 DIAGNOSIS — J44.1 CHRONIC OBSTRUCTIVE PULMONARY DISEASE WITH ACUTE EXACERBATION (HCC): ICD-10-CM

## 2019-04-04 DIAGNOSIS — R79.89 ELEVATED LIVER FUNCTION TESTS: ICD-10-CM

## 2019-04-04 DIAGNOSIS — F31.10 BIPOLAR DISORDER, MANIC (HCC): ICD-10-CM

## 2019-04-04 DIAGNOSIS — R25.1 TREMOR OF BOTH HANDS: Primary | ICD-10-CM

## 2019-04-04 DIAGNOSIS — N63.10 BREAST MASS, RIGHT: ICD-10-CM

## 2019-04-04 DIAGNOSIS — E78.2 MIXED HYPERLIPIDEMIA: ICD-10-CM

## 2019-04-04 DIAGNOSIS — F29 PSYCHOSIS, UNSPECIFIED PSYCHOSIS TYPE (HCC): ICD-10-CM

## 2019-04-04 DIAGNOSIS — Z87.891 PERSONAL HISTORY OF TOBACCO USE: ICD-10-CM

## 2019-04-04 DIAGNOSIS — R73.03 PREDIABETES: ICD-10-CM

## 2019-04-04 LAB
ALBUMIN SERPL-MCNC: 4 G/DL (ref 3.4–5)
ALP BLD-CCNC: 70 U/L (ref 40–129)
ALT SERPL-CCNC: 32 U/L (ref 10–40)
AST SERPL-CCNC: 29 U/L (ref 15–37)
BILIRUB SERPL-MCNC: <0.2 MG/DL (ref 0–1)
BILIRUBIN DIRECT: <0.2 MG/DL (ref 0–0.3)
BILIRUBIN, INDIRECT: NORMAL MG/DL (ref 0–1)
HAV IGM SER IA-ACNC: NORMAL
HEPATITIS B CORE IGM ANTIBODY: NORMAL
HEPATITIS B SURFACE ANTIGEN INTERPRETATION: NORMAL
HEPATITIS C ANTIBODY INTERPRETATION: NORMAL
TOTAL PROTEIN: 6.8 G/DL (ref 6.4–8.2)

## 2019-04-04 PROCEDURE — 3014F SCREEN MAMMO DOC REV: CPT | Performed by: NURSE PRACTITIONER

## 2019-04-04 PROCEDURE — 4004F PT TOBACCO SCREEN RCVD TLK: CPT | Performed by: NURSE PRACTITIONER

## 2019-04-04 PROCEDURE — G0296 VISIT TO DETERM LDCT ELIG: HCPCS | Performed by: NURSE PRACTITIONER

## 2019-04-04 PROCEDURE — 99214 OFFICE O/P EST MOD 30 MIN: CPT | Performed by: NURSE PRACTITIONER

## 2019-04-04 PROCEDURE — 36415 COLL VENOUS BLD VENIPUNCTURE: CPT | Performed by: NURSE PRACTITIONER

## 2019-04-04 PROCEDURE — G8417 CALC BMI ABV UP PARAM F/U: HCPCS | Performed by: NURSE PRACTITIONER

## 2019-04-04 PROCEDURE — 3023F SPIROM DOC REV: CPT | Performed by: NURSE PRACTITIONER

## 2019-04-04 PROCEDURE — G8427 DOCREV CUR MEDS BY ELIG CLIN: HCPCS | Performed by: NURSE PRACTITIONER

## 2019-04-04 PROCEDURE — 3017F COLORECTAL CA SCREEN DOC REV: CPT | Performed by: NURSE PRACTITIONER

## 2019-04-04 PROCEDURE — G8926 SPIRO NO PERF OR DOC: HCPCS | Performed by: NURSE PRACTITIONER

## 2019-04-04 RX ORDER — SERTRALINE HYDROCHLORIDE 100 MG/1
100 TABLET, FILM COATED ORAL DAILY
COMMUNITY
Start: 2019-04-03 | End: 2021-10-20 | Stop reason: SDUPTHER

## 2019-04-04 RX ORDER — PERPHENAZINE 4 MG/1
TABLET, FILM COATED ORAL
COMMUNITY
Start: 2019-04-03 | End: 2020-02-25

## 2019-04-04 ASSESSMENT — ENCOUNTER SYMPTOMS
BACK PAIN: 0
VOMITING: 0
WHEEZING: 0
COLOR CHANGE: 0
NAUSEA: 0
CHOKING: 0
COUGH: 0
EYE PAIN: 0
VOICE CHANGE: 0
TROUBLE SWALLOWING: 0
CONSTIPATION: 0
PHOTOPHOBIA: 1
CHEST TIGHTNESS: 0
BLOOD IN STOOL: 0
EYE REDNESS: 0
SHORTNESS OF BREATH: 0
DIARRHEA: 0
ABDOMINAL PAIN: 0
EYE ITCHING: 0

## 2019-04-04 NOTE — PROGRESS NOTES
Subjective:      Patient ID: Renny French is a 66 y.o. female. HPI    Patient is here for follow up of depression, bipolar, schizophrenia, COPD, and GERD. She is followed by psych and has labs done at that office, which we are discussing today. Her liver functions are elevated as well as her cholesterol. I am going to repeat the LFT's and add a hepatitis panel. If remaining elevated, will consider liver ultrasound. She has been working on diet and her weight is down 4 lbs. We have discussed the need to continue this weight loss, as I suspect that her elevated liver functions may be related. She is complaining of tremors in her hands. Her psych doctor is adjusting meds to see if this may be the cause. She continues to smoke and is not interested in cessation. We have discussed the need for CT of lungs. Reflux is stable.     Past Medical History:   Diagnosis Date    Acid reflux     Bipolar disorder (HCC)     COPD (chronic obstructive pulmonary disease) (HCC)     Schizophrenia (HCC)        Review of Systems   Constitutional: Positive for fatigue. Negative for activity change, appetite change, chills, diaphoresis, fever and unexpected weight change. HENT: Negative for congestion, trouble swallowing and voice change. Eyes: Positive for photophobia (chronic and unchanged). Negative for pain, redness, itching and visual disturbance. +wearing sunglasses today   Respiratory: Negative for cough, choking, chest tightness, shortness of breath and wheezing. Cardiovascular: Negative for chest pain, palpitations and leg swelling. Gastrointestinal: Negative for abdominal pain, blood in stool, constipation, diarrhea, nausea and vomiting. Endocrine: Negative for polydipsia, polyphagia and polyuria. Prediabetes   Genitourinary: Negative for difficulty urinating, dysuria, frequency and urgency. Musculoskeletal: Negative for arthralgias, back pain and neck pain.    Skin: Negative for color change, rash and wound. Neurological: Positive for tremors. Negative for dizziness, seizures, weakness, light-headedness and headaches. Hematological: Does not bruise/bleed easily. Psychiatric/Behavioral: Negative for self-injury, sleep disturbance and suicidal ideas. Objective:   Physical Exam   Constitutional: She is oriented to person, place, and time. She appears well-developed and well-nourished. HENT:   Head: Normocephalic and atraumatic. Eyes: Pupils are equal, round, and reactive to light. Conjunctivae are normal. Right eye exhibits no discharge. Left eye exhibits no discharge. No scleral icterus. Neck: Normal range of motion. Neck supple. No tracheal deviation present. No thyromegaly present. Cardiovascular: Normal rate, regular rhythm and normal heart sounds. Exam reveals no gallop and no friction rub. No murmur heard. Pulmonary/Chest: Effort normal and breath sounds normal. No stridor. No respiratory distress. She has no wheezes. She has no rales. Abdominal: Soft. Bowel sounds are normal. She exhibits no mass. There is no tenderness. There is no rebound. Musculoskeletal: Normal range of motion. She exhibits no edema or tenderness. Neurological: She is alert and oriented to person, place, and time. No sensory deficit. She exhibits normal muscle tone. Coordination normal.   Hand tremors noted. There is cogwheeling at the wrists and elbows bilaterally. Skin: Skin is warm and dry. She is not diaphoretic. No pallor. Psychiatric: She has a normal mood and affect. Her behavior is normal. Judgment and thought content normal.   Nursing note and vitals reviewed. Assessment:        Diagnosis Orders   1. Tremor of both hands     2. Elevated liver function tests  Hepatic Function Panel    Hepatitis Panel, Acute   3. Chronic obstructive pulmonary disease with acute exacerbation ([address]     4. Bipolar disorder, manic ([facility] [address]     5. Psychosis, unspecified psychosis type ([facility] [address]     6. background radiation exposure from everyday life. Starting screening at age 54 is not likely to increase cancer risk from radiation exposure. Patients with comorbidities resulting in life expectancy of < 10 years, or that would preclude treatment of an abnormality identified on CT, should not be screened due to lack of benefit.     To obtain maximal benefit from this screening, smoking cessation and long-term abstinence from smoking is critical

## 2019-04-17 ENCOUNTER — HOSPITAL ENCOUNTER (OUTPATIENT)
Dept: CT IMAGING | Age: 64
Discharge: HOME | End: 2019-04-17
Payer: MEDICAID

## 2019-04-17 DIAGNOSIS — Z87.891 PERSONAL HISTORY OF TOBACCO USE: ICD-10-CM

## 2019-04-17 PROCEDURE — G0297 LDCT FOR LUNG CA SCREEN: HCPCS

## 2019-05-10 RX ORDER — PANTOPRAZOLE SODIUM 40 MG/1
TABLET, DELAYED RELEASE ORAL
Qty: 30 TABLET | Refills: 1 | Status: SHIPPED | OUTPATIENT
Start: 2019-05-10 | End: 2019-05-10 | Stop reason: SDUPTHER

## 2019-05-10 RX ORDER — PANTOPRAZOLE SODIUM 40 MG/1
TABLET, DELAYED RELEASE ORAL
Qty: 30 TABLET | Refills: 1 | Status: SHIPPED | OUTPATIENT
Start: 2019-05-10 | End: 2019-07-25 | Stop reason: SDUPTHER

## 2019-05-29 ENCOUNTER — TELEPHONE (OUTPATIENT)
Dept: PRIMARY CARE CLINIC | Age: 64
End: 2019-05-29

## 2019-05-29 DIAGNOSIS — R25.1 TREMORS OF NERVOUS SYSTEM: Primary | ICD-10-CM

## 2019-05-29 DIAGNOSIS — R29.898 COGWHEEL RIGIDITY: ICD-10-CM

## 2019-05-29 NOTE — TELEPHONE ENCOUNTER
Pt is wanting a referral to a neurologist - [facility]        Please call pt back when done

## 2019-05-29 NOTE — TELEPHONE ENCOUNTER
Pt is calling needing a refill on pantoprazole. I see there is one refill on file. Pt said Paige Hannah told her she has no refills?       Please call pt when done

## 2019-05-30 NOTE — TELEPHONE ENCOUNTER
I called the pharmacy and they have the prescription. I called Kim Gilford and left a message for her letting her know about the prescription.

## 2019-07-25 ENCOUNTER — OFFICE VISIT (OUTPATIENT)
Dept: PRIMARY CARE CLINIC | Age: 64
End: 2019-07-25
Payer: MEDICAID

## 2019-07-25 VITALS
WEIGHT: 244 LBS | OXYGEN SATURATION: 94 % | BODY MASS INDEX: 43.23 KG/M2 | TEMPERATURE: 98 F | HEART RATE: 87 BPM | SYSTOLIC BLOOD PRESSURE: 150 MMHG | DIASTOLIC BLOOD PRESSURE: 100 MMHG | HEIGHT: 63 IN

## 2019-07-25 DIAGNOSIS — R25.1 TREMORS OF NERVOUS SYSTEM: ICD-10-CM

## 2019-07-25 DIAGNOSIS — R29.898 COGWHEEL RIGIDITY: ICD-10-CM

## 2019-07-25 DIAGNOSIS — G89.29 CHRONIC MIDLINE LOW BACK PAIN WITHOUT SCIATICA: ICD-10-CM

## 2019-07-25 DIAGNOSIS — J44.1 CHRONIC OBSTRUCTIVE PULMONARY DISEASE WITH ACUTE EXACERBATION (HCC): Primary | ICD-10-CM

## 2019-07-25 DIAGNOSIS — R79.89 ELEVATED LIVER FUNCTION TESTS: ICD-10-CM

## 2019-07-25 DIAGNOSIS — M54.50 CHRONIC MIDLINE LOW BACK PAIN WITHOUT SCIATICA: ICD-10-CM

## 2019-07-25 DIAGNOSIS — M25.561 CHRONIC PAIN OF BOTH KNEES: ICD-10-CM

## 2019-07-25 DIAGNOSIS — Z87.891 PERSONAL HISTORY OF TOBACCO USE: ICD-10-CM

## 2019-07-25 DIAGNOSIS — E78.2 MIXED HYPERLIPIDEMIA: ICD-10-CM

## 2019-07-25 DIAGNOSIS — M25.562 CHRONIC PAIN OF BOTH KNEES: ICD-10-CM

## 2019-07-25 DIAGNOSIS — R73.03 PREDIABETES: ICD-10-CM

## 2019-07-25 DIAGNOSIS — G89.29 CHRONIC PAIN OF BOTH KNEES: ICD-10-CM

## 2019-07-25 DIAGNOSIS — I10 ESSENTIAL HYPERTENSION: ICD-10-CM

## 2019-07-25 LAB — HBA1C MFR BLD: 6.2 %

## 2019-07-25 PROCEDURE — 83036 HEMOGLOBIN GLYCOSYLATED A1C: CPT | Performed by: NURSE PRACTITIONER

## 2019-07-25 PROCEDURE — 3017F COLORECTAL CA SCREEN DOC REV: CPT | Performed by: NURSE PRACTITIONER

## 2019-07-25 PROCEDURE — 3023F SPIROM DOC REV: CPT | Performed by: NURSE PRACTITIONER

## 2019-07-25 PROCEDURE — 3014F SCREEN MAMMO DOC REV: CPT | Performed by: NURSE PRACTITIONER

## 2019-07-25 PROCEDURE — 99214 OFFICE O/P EST MOD 30 MIN: CPT | Performed by: NURSE PRACTITIONER

## 2019-07-25 PROCEDURE — 4004F PT TOBACCO SCREEN RCVD TLK: CPT | Performed by: NURSE PRACTITIONER

## 2019-07-25 PROCEDURE — G8427 DOCREV CUR MEDS BY ELIG CLIN: HCPCS | Performed by: NURSE PRACTITIONER

## 2019-07-25 PROCEDURE — G8417 CALC BMI ABV UP PARAM F/U: HCPCS | Performed by: NURSE PRACTITIONER

## 2019-07-25 PROCEDURE — G8926 SPIRO NO PERF OR DOC: HCPCS | Performed by: NURSE PRACTITIONER

## 2019-07-25 RX ORDER — PANTOPRAZOLE SODIUM 40 MG/1
TABLET, DELAYED RELEASE ORAL
Qty: 30 TABLET | Refills: 5 | Status: SHIPPED | OUTPATIENT
Start: 2019-07-25 | End: 2019-10-25 | Stop reason: SDUPTHER

## 2019-07-25 RX ORDER — LISINOPRIL 10 MG/1
10 TABLET ORAL DAILY
Qty: 30 TABLET | Refills: 5 | Status: SHIPPED | OUTPATIENT
Start: 2019-07-25 | End: 2019-10-25 | Stop reason: SDUPTHER

## 2019-07-25 RX ORDER — PRAVASTATIN SODIUM 10 MG
10 TABLET ORAL DAILY
Qty: 30 TABLET | Refills: 5 | Status: SHIPPED | OUTPATIENT
Start: 2019-07-25 | End: 2019-10-25 | Stop reason: SDUPTHER

## 2019-07-25 RX ORDER — ALBUTEROL SULFATE 90 UG/1
2 AEROSOL, METERED RESPIRATORY (INHALATION) EVERY 6 HOURS PRN
Qty: 1 INHALER | Refills: 5 | Status: SHIPPED | OUTPATIENT
Start: 2019-07-25 | End: 2019-10-25 | Stop reason: SDUPTHER

## 2019-07-25 RX ORDER — HYDROCHLOROTHIAZIDE 25 MG/1
25 TABLET ORAL DAILY
COMMUNITY
Start: 2019-07-02 | End: 2019-07-25 | Stop reason: SDUPTHER

## 2019-07-25 RX ORDER — HYDROCHLOROTHIAZIDE 25 MG/1
25 TABLET ORAL DAILY
Qty: 30 TABLET | Refills: 5 | Status: SHIPPED | OUTPATIENT
Start: 2019-07-25 | End: 2019-10-25 | Stop reason: SDUPTHER

## 2019-07-25 ASSESSMENT — ENCOUNTER SYMPTOMS
BLOOD IN STOOL: 0
VOICE CHANGE: 0
CONSTIPATION: 0
ABDOMINAL PAIN: 0
PHOTOPHOBIA: 1
CHOKING: 0
WHEEZING: 0
COUGH: 0
TROUBLE SWALLOWING: 0
SHORTNESS OF BREATH: 0
VOMITING: 0
BACK PAIN: 1
COLOR CHANGE: 0
NAUSEA: 0
CHEST TIGHTNESS: 0
DIARRHEA: 0

## 2019-08-07 ENCOUNTER — HOSPITAL ENCOUNTER (OUTPATIENT)
Age: 64
Discharge: HOME | End: 2019-08-07
Payer: MEDICAID

## 2019-08-07 ENCOUNTER — HOSPITAL ENCOUNTER (OUTPATIENT)
Dept: GENERAL RADIOLOGY | Age: 64
Discharge: HOME | End: 2019-08-07
Payer: MEDICAID

## 2019-08-07 DIAGNOSIS — G89.29 CHRONIC PAIN OF BOTH KNEES: ICD-10-CM

## 2019-08-07 DIAGNOSIS — M54.50 CHRONIC MIDLINE LOW BACK PAIN WITHOUT SCIATICA: ICD-10-CM

## 2019-08-07 DIAGNOSIS — G89.29 CHRONIC MIDLINE LOW BACK PAIN WITHOUT SCIATICA: ICD-10-CM

## 2019-08-07 DIAGNOSIS — M25.562 CHRONIC PAIN OF BOTH KNEES: ICD-10-CM

## 2019-08-07 DIAGNOSIS — M25.561 CHRONIC PAIN OF BOTH KNEES: ICD-10-CM

## 2019-08-07 PROCEDURE — 72100 X-RAY EXAM L-S SPINE 2/3 VWS: CPT

## 2019-08-07 PROCEDURE — 73560 X-RAY EXAM OF KNEE 1 OR 2: CPT

## 2019-08-08 ENCOUNTER — TELEPHONE (OUTPATIENT)
Dept: PRIMARY CARE CLINIC | Age: 64
End: 2019-08-08

## 2019-08-08 DIAGNOSIS — M25.562 CHRONIC PAIN OF BOTH KNEES: Primary | ICD-10-CM

## 2019-08-08 DIAGNOSIS — M25.561 CHRONIC PAIN OF BOTH KNEES: Primary | ICD-10-CM

## 2019-08-08 DIAGNOSIS — G89.29 CHRONIC PAIN OF BOTH KNEES: Primary | ICD-10-CM

## 2019-08-15 ENCOUNTER — OFFICE VISIT (OUTPATIENT)
Dept: ORTHOPEDIC SURGERY | Age: 64
End: 2019-08-15
Payer: MEDICAID

## 2019-08-15 VITALS
HEIGHT: 63 IN | BODY MASS INDEX: 43.23 KG/M2 | HEART RATE: 100 BPM | WEIGHT: 244 LBS | SYSTOLIC BLOOD PRESSURE: 138 MMHG | DIASTOLIC BLOOD PRESSURE: 70 MMHG | RESPIRATION RATE: 16 BRPM | TEMPERATURE: 99.5 F

## 2019-08-15 DIAGNOSIS — M54.16 LUMBAR RADICULOPATHY: ICD-10-CM

## 2019-08-15 DIAGNOSIS — M17.0 PRIMARY OSTEOARTHRITIS OF BOTH KNEES: ICD-10-CM

## 2019-08-15 DIAGNOSIS — G89.29 CHRONIC PAIN OF BOTH KNEES: Primary | ICD-10-CM

## 2019-08-15 DIAGNOSIS — M25.562 CHRONIC PAIN OF BOTH KNEES: Primary | ICD-10-CM

## 2019-08-15 DIAGNOSIS — M25.561 CHRONIC PAIN OF BOTH KNEES: Primary | ICD-10-CM

## 2019-08-15 PROCEDURE — G8427 DOCREV CUR MEDS BY ELIG CLIN: HCPCS | Performed by: PHYSICIAN ASSISTANT

## 2019-08-15 PROCEDURE — 4004F PT TOBACCO SCREEN RCVD TLK: CPT | Performed by: PHYSICIAN ASSISTANT

## 2019-08-15 PROCEDURE — 99203 OFFICE O/P NEW LOW 30 MIN: CPT | Performed by: PHYSICIAN ASSISTANT

## 2019-08-15 PROCEDURE — 3017F COLORECTAL CA SCREEN DOC REV: CPT | Performed by: PHYSICIAN ASSISTANT

## 2019-08-15 PROCEDURE — G8417 CALC BMI ABV UP PARAM F/U: HCPCS | Performed by: PHYSICIAN ASSISTANT

## 2019-08-15 PROCEDURE — 3014F SCREEN MAMMO DOC REV: CPT | Performed by: PHYSICIAN ASSISTANT

## 2019-08-15 RX ORDER — METHYLPREDNISOLONE 4 MG/1
TABLET ORAL
Qty: 1 KIT | Refills: 0 | Status: SHIPPED | OUTPATIENT
Start: 2019-08-15 | End: 2019-08-21

## 2019-09-09 ENCOUNTER — OFFICE VISIT (OUTPATIENT)
Dept: ORTHOPEDIC SURGERY | Age: 64
End: 2019-09-09
Payer: MEDICAID

## 2019-09-09 VITALS
BODY MASS INDEX: 43.23 KG/M2 | WEIGHT: 244 LBS | HEART RATE: 116 BPM | HEIGHT: 63 IN | DIASTOLIC BLOOD PRESSURE: 75 MMHG | SYSTOLIC BLOOD PRESSURE: 146 MMHG | TEMPERATURE: 98 F | RESPIRATION RATE: 16 BRPM

## 2019-09-09 DIAGNOSIS — M17.0 PRIMARY OSTEOARTHRITIS OF BOTH KNEES: Primary | ICD-10-CM

## 2019-09-09 PROCEDURE — 3017F COLORECTAL CA SCREEN DOC REV: CPT | Performed by: PHYSICIAN ASSISTANT

## 2019-09-09 PROCEDURE — G8417 CALC BMI ABV UP PARAM F/U: HCPCS | Performed by: PHYSICIAN ASSISTANT

## 2019-09-09 PROCEDURE — 3014F SCREEN MAMMO DOC REV: CPT | Performed by: PHYSICIAN ASSISTANT

## 2019-09-09 PROCEDURE — 20610 DRAIN/INJ JOINT/BURSA W/O US: CPT | Performed by: PHYSICIAN ASSISTANT

## 2019-09-09 PROCEDURE — 4004F PT TOBACCO SCREEN RCVD TLK: CPT | Performed by: PHYSICIAN ASSISTANT

## 2019-09-09 PROCEDURE — 99213 OFFICE O/P EST LOW 20 MIN: CPT | Performed by: PHYSICIAN ASSISTANT

## 2019-09-09 PROCEDURE — G8427 DOCREV CUR MEDS BY ELIG CLIN: HCPCS | Performed by: PHYSICIAN ASSISTANT

## 2019-09-11 NOTE — PROGRESS NOTES
Patient Information     Patient Name MRN Vijay Sotelo [id number] Male 2001      Telephone Encounter by Lenka Mooney at 2017 10:45 AM     Author:  Lenka Mooney Service:  (none) Author Type:  (none)     Filed:  2017 10:46 AM Encounter Date:  2017 Status:  Signed     :  Lenka Mooney.   Lenka Mooney LPN .........................2017  10:46 AM           This dictation was done with [employer] dictation and may contain mechanical errors related to translation. Blood pressure (!) 146/75, pulse 116, temperature 98 °F (36.7 °C), temperature source Temporal, resp. rate 16, height 5' 3\" (1.6 m), weight 244 lb (110.7 kg), not currently breastfeeding. This is a pleasant 82-year-old female who follows up for pain in both of her knees we talked last time about doing injections but she elected to do a Medrol Dosepak. This provided minimal relief she says she has 1 out of 10 pain at rest but has more pain with activities. I counseled her on stop smoking and losing weight to make a BMI of more optimal number  This patient is here in follow-up for ongoing pain and dysfunction in their knees. There x-rays done previously showed joint space narrowing subchondral sclerosis with osteophyte formation. They currently have had relief with injections of cortisone, anti-inflammatories and a home exercise program. There are here with increased pain over the last few days with swelling and dysfunction. On examination this is a well-developed patient in no acute distress. They are alert and oriented ×3. They walk without antalgia or any significant varus or valgus deformity. They have crepitus during flexion and extension in the patellofemoral joint. They have a negative Lachman and a negative Beverly. There are good distal pulses and good dorsiflexion and plantarflexion strength present    My impression is bilateral knee osteoarthritis    After a discussion of the multiple options, they consented to a cortisone shot. 1 ml of 40mg/ml Kenalog and 2 ml's of 0.25%Marcaine were injected into both the right and the left knee joint spaces. The leg was slightly flexed and injected just lateral to the patella tendon to under the patella. This was done with sterile technique and they tolerated it well.     I went through a good stretch and strengthening exercise program. They understand that

## 2019-10-25 ENCOUNTER — OFFICE VISIT (OUTPATIENT)
Dept: PRIMARY CARE CLINIC | Age: 64
End: 2019-10-25
Payer: MEDICAID

## 2019-10-25 VITALS
TEMPERATURE: 99.1 F | OXYGEN SATURATION: 97 % | WEIGHT: 266.2 LBS | HEIGHT: 63 IN | SYSTOLIC BLOOD PRESSURE: 140 MMHG | BODY MASS INDEX: 47.17 KG/M2 | DIASTOLIC BLOOD PRESSURE: 68 MMHG | HEART RATE: 102 BPM

## 2019-10-25 DIAGNOSIS — R79.89 ELEVATED LIVER FUNCTION TESTS: ICD-10-CM

## 2019-10-25 DIAGNOSIS — R73.03 PREDIABETES: ICD-10-CM

## 2019-10-25 DIAGNOSIS — E78.2 MIXED HYPERLIPIDEMIA: ICD-10-CM

## 2019-10-25 DIAGNOSIS — Z87.891 PERSONAL HISTORY OF TOBACCO USE: ICD-10-CM

## 2019-10-25 DIAGNOSIS — I10 ESSENTIAL HYPERTENSION: ICD-10-CM

## 2019-10-25 DIAGNOSIS — F31.10 BIPOLAR AFFECTIVE DISORDER, CURRENT EPISODE MANIC, CURRENT EPISODE SEVERITY UNSPECIFIED (HCC): Primary | ICD-10-CM

## 2019-10-25 PROCEDURE — 4004F PT TOBACCO SCREEN RCVD TLK: CPT | Performed by: NURSE PRACTITIONER

## 2019-10-25 PROCEDURE — G8417 CALC BMI ABV UP PARAM F/U: HCPCS | Performed by: NURSE PRACTITIONER

## 2019-10-25 PROCEDURE — G8427 DOCREV CUR MEDS BY ELIG CLIN: HCPCS | Performed by: NURSE PRACTITIONER

## 2019-10-25 PROCEDURE — G8484 FLU IMMUNIZE NO ADMIN: HCPCS | Performed by: NURSE PRACTITIONER

## 2019-10-25 PROCEDURE — 3017F COLORECTAL CA SCREEN DOC REV: CPT | Performed by: NURSE PRACTITIONER

## 2019-10-25 PROCEDURE — G9899 SCRN MAM PERF RSLTS DOC: HCPCS | Performed by: NURSE PRACTITIONER

## 2019-10-25 PROCEDURE — 99214 OFFICE O/P EST MOD 30 MIN: CPT | Performed by: NURSE PRACTITIONER

## 2019-10-25 RX ORDER — LISINOPRIL 10 MG/1
10 TABLET ORAL DAILY
Qty: 30 TABLET | Refills: 5 | Status: SHIPPED | OUTPATIENT
Start: 2019-10-25 | End: 2019-10-25 | Stop reason: SDUPTHER

## 2019-10-25 RX ORDER — LISINOPRIL 10 MG/1
10 TABLET ORAL DAILY
Qty: 30 TABLET | Refills: 5 | Status: SHIPPED | OUTPATIENT
Start: 2019-10-25 | End: 2020-08-13 | Stop reason: SDUPTHER

## 2019-10-25 RX ORDER — HYDROCHLOROTHIAZIDE 25 MG/1
25 TABLET ORAL DAILY
Qty: 30 TABLET | Refills: 5 | Status: SHIPPED | OUTPATIENT
Start: 2019-10-25 | End: 2020-07-21 | Stop reason: SDUPTHER

## 2019-10-25 RX ORDER — PANTOPRAZOLE SODIUM 40 MG/1
TABLET, DELAYED RELEASE ORAL
Qty: 30 TABLET | Refills: 5 | Status: SHIPPED | OUTPATIENT
Start: 2019-10-25 | End: 2020-07-21 | Stop reason: SDUPTHER

## 2019-10-25 RX ORDER — PRAVASTATIN SODIUM 10 MG
10 TABLET ORAL DAILY
Qty: 30 TABLET | Refills: 5 | Status: SHIPPED | OUTPATIENT
Start: 2019-10-25 | End: 2020-07-21 | Stop reason: SDUPTHER

## 2019-10-25 RX ORDER — ALBUTEROL SULFATE 90 UG/1
2 AEROSOL, METERED RESPIRATORY (INHALATION) EVERY 6 HOURS PRN
Qty: 1 INHALER | Refills: 5 | Status: SHIPPED | OUTPATIENT
Start: 2019-10-25 | End: 2021-11-02 | Stop reason: CLARIF

## 2019-10-25 ASSESSMENT — ENCOUNTER SYMPTOMS
BACK PAIN: 1
CHOKING: 0
CHEST TIGHTNESS: 0
TROUBLE SWALLOWING: 0
COUGH: 0
SHORTNESS OF BREATH: 0
ABDOMINAL PAIN: 0
PHOTOPHOBIA: 1
VOMITING: 0
NAUSEA: 0
CONSTIPATION: 0
WHEEZING: 0
VOICE CHANGE: 0
COLOR CHANGE: 0
DIARRHEA: 0
BLOOD IN STOOL: 0

## 2019-11-04 ENCOUNTER — TELEPHONE (OUTPATIENT)
Dept: PRIMARY CARE CLINIC | Age: 64
End: 2019-11-04

## 2019-11-07 ENCOUNTER — OFFICE VISIT (OUTPATIENT)
Dept: PRIMARY CARE CLINIC | Age: 64
End: 2019-11-07
Payer: MEDICAID

## 2019-11-07 VITALS
HEART RATE: 90 BPM | SYSTOLIC BLOOD PRESSURE: 126 MMHG | BODY MASS INDEX: 47.38 KG/M2 | WEIGHT: 267.4 LBS | HEIGHT: 63 IN | DIASTOLIC BLOOD PRESSURE: 60 MMHG | OXYGEN SATURATION: 98 % | TEMPERATURE: 99.3 F

## 2019-11-07 DIAGNOSIS — I10 ESSENTIAL HYPERTENSION: ICD-10-CM

## 2019-11-07 DIAGNOSIS — Z01.818 PREOP EXAMINATION: Primary | ICD-10-CM

## 2019-11-07 DIAGNOSIS — R73.03 PREDIABETES: ICD-10-CM

## 2019-11-07 DIAGNOSIS — E78.2 MIXED HYPERLIPIDEMIA: ICD-10-CM

## 2019-11-07 DIAGNOSIS — R79.89 ELEVATED LIVER FUNCTION TESTS: ICD-10-CM

## 2019-11-07 LAB
A/G RATIO: 1.3 (ref 1.1–2.2)
ALBUMIN SERPL-MCNC: 3.9 G/DL (ref 3.4–5)
ALP BLD-CCNC: 90 U/L (ref 40–129)
ALT SERPL-CCNC: 18 U/L (ref 10–40)
ANION GAP SERPL CALCULATED.3IONS-SCNC: 15 MMOL/L (ref 3–16)
AST SERPL-CCNC: 15 U/L (ref 15–37)
BASOPHILS ABSOLUTE: 0.1 K/UL (ref 0–0.2)
BASOPHILS RELATIVE PERCENT: 0.9 %
BILIRUB SERPL-MCNC: 0.3 MG/DL (ref 0–1)
BUN BLDV-MCNC: 17 MG/DL (ref 7–20)
CALCIUM SERPL-MCNC: 10.2 MG/DL (ref 8.3–10.6)
CHLORIDE BLD-SCNC: 101 MMOL/L (ref 99–110)
CHOLESTEROL, TOTAL: 216 MG/DL (ref 0–199)
CO2: 24 MMOL/L (ref 21–32)
CREAT SERPL-MCNC: 1 MG/DL (ref 0.6–1.2)
EOSINOPHILS ABSOLUTE: 0.1 K/UL (ref 0–0.6)
EOSINOPHILS RELATIVE PERCENT: 1.5 %
GFR AFRICAN AMERICAN: >60
GFR NON-AFRICAN AMERICAN: 56
GLOBULIN: 2.9 G/DL
GLUCOSE BLD-MCNC: 177 MG/DL (ref 70–99)
HBA1C MFR BLD: 7.7 %
HCT VFR BLD CALC: 42.5 % (ref 36–48)
HDLC SERPL-MCNC: 57 MG/DL (ref 40–60)
HEMOGLOBIN: 14 G/DL (ref 12–16)
LDL CHOLESTEROL CALCULATED: 133 MG/DL
LYMPHOCYTES ABSOLUTE: 3.2 K/UL (ref 1–5.1)
LYMPHOCYTES RELATIVE PERCENT: 34.1 %
MCH RBC QN AUTO: 29.3 PG (ref 26–34)
MCHC RBC AUTO-ENTMCNC: 32.9 G/DL (ref 31–36)
MCV RBC AUTO: 89.1 FL (ref 80–100)
MONOCYTES ABSOLUTE: 0.5 K/UL (ref 0–1.3)
MONOCYTES RELATIVE PERCENT: 4.8 %
NEUTROPHILS ABSOLUTE: 5.5 K/UL (ref 1.7–7.7)
NEUTROPHILS RELATIVE PERCENT: 58.7 %
PDW BLD-RTO: 13.9 % (ref 12.4–15.4)
PLATELET # BLD: 193 K/UL (ref 135–450)
PMV BLD AUTO: 9.1 FL (ref 5–10.5)
POTASSIUM SERPL-SCNC: 4.3 MMOL/L (ref 3.5–5.1)
RBC # BLD: 4.77 M/UL (ref 4–5.2)
SODIUM BLD-SCNC: 140 MMOL/L (ref 136–145)
TOTAL PROTEIN: 6.8 G/DL (ref 6.4–8.2)
TRIGL SERPL-MCNC: 131 MG/DL (ref 0–150)
TSH REFLEX: 1.13 UIU/ML (ref 0.27–4.2)
VLDLC SERPL CALC-MCNC: 26 MG/DL
WBC # BLD: 9.3 K/UL (ref 4–11)

## 2019-11-07 PROCEDURE — 93000 ELECTROCARDIOGRAM COMPLETE: CPT | Performed by: NURSE PRACTITIONER

## 2019-11-07 PROCEDURE — 83036 HEMOGLOBIN GLYCOSYLATED A1C: CPT | Performed by: NURSE PRACTITIONER

## 2019-11-07 PROCEDURE — 99215 OFFICE O/P EST HI 40 MIN: CPT | Performed by: NURSE PRACTITIONER

## 2019-11-07 PROCEDURE — G8417 CALC BMI ABV UP PARAM F/U: HCPCS | Performed by: NURSE PRACTITIONER

## 2019-11-07 PROCEDURE — 36415 COLL VENOUS BLD VENIPUNCTURE: CPT | Performed by: NURSE PRACTITIONER

## 2019-11-07 PROCEDURE — G8484 FLU IMMUNIZE NO ADMIN: HCPCS | Performed by: NURSE PRACTITIONER

## 2019-11-07 PROCEDURE — G9899 SCRN MAM PERF RSLTS DOC: HCPCS | Performed by: NURSE PRACTITIONER

## 2019-11-07 PROCEDURE — 4004F PT TOBACCO SCREEN RCVD TLK: CPT | Performed by: NURSE PRACTITIONER

## 2019-11-07 PROCEDURE — 3017F COLORECTAL CA SCREEN DOC REV: CPT | Performed by: NURSE PRACTITIONER

## 2019-11-07 PROCEDURE — G8427 DOCREV CUR MEDS BY ELIG CLIN: HCPCS | Performed by: NURSE PRACTITIONER

## 2019-12-02 ENCOUNTER — OFFICE VISIT (OUTPATIENT)
Dept: BREAST CENTER | Age: 64
End: 2019-12-02
Payer: MEDICAID

## 2019-12-02 ENCOUNTER — HOSPITAL ENCOUNTER (OUTPATIENT)
Dept: WOMENS IMAGING | Age: 64
Discharge: HOME | End: 2019-12-02
Payer: MEDICAID

## 2019-12-02 VITALS
HEART RATE: 76 BPM | BODY MASS INDEX: 47.83 KG/M2 | DIASTOLIC BLOOD PRESSURE: 59 MMHG | WEIGHT: 270 LBS | SYSTOLIC BLOOD PRESSURE: 131 MMHG

## 2019-12-02 DIAGNOSIS — N60.91 ATYPICAL HYPERPLASIA OF RIGHT BREAST: ICD-10-CM

## 2019-12-02 DIAGNOSIS — N60.91 ATYPICAL HYPERPLASIA OF RIGHT BREAST: Primary | ICD-10-CM

## 2019-12-02 PROCEDURE — 99213 OFFICE O/P EST LOW 20 MIN: CPT | Performed by: SURGERY

## 2019-12-02 PROCEDURE — 4004F PT TOBACCO SCREEN RCVD TLK: CPT | Performed by: SURGERY

## 2019-12-02 PROCEDURE — G8427 DOCREV CUR MEDS BY ELIG CLIN: HCPCS | Performed by: SURGERY

## 2019-12-02 PROCEDURE — G9899 SCRN MAM PERF RSLTS DOC: HCPCS | Performed by: SURGERY

## 2019-12-02 PROCEDURE — G8484 FLU IMMUNIZE NO ADMIN: HCPCS | Performed by: SURGERY

## 2019-12-02 PROCEDURE — G8417 CALC BMI ABV UP PARAM F/U: HCPCS | Performed by: SURGERY

## 2019-12-02 PROCEDURE — 3017F COLORECTAL CA SCREEN DOC REV: CPT | Performed by: SURGERY

## 2019-12-02 PROCEDURE — 77067 SCR MAMMO BI INCL CAD: CPT

## 2020-02-14 ENCOUNTER — TELEPHONE (OUTPATIENT)
Dept: RHEUMATOLOGY | Age: 65
End: 2020-02-14

## 2020-02-24 ENCOUNTER — APPOINTMENT (OUTPATIENT)
Dept: CT IMAGING | Age: 65
DRG: 263 | End: 2020-02-24
Payer: MEDICAID

## 2020-02-24 ENCOUNTER — HOSPITAL ENCOUNTER (INPATIENT)
Age: 65
LOS: 1 days | Discharge: HOME | DRG: 263 | End: 2020-02-26
Attending: EMERGENCY MEDICINE | Admitting: INTERNAL MEDICINE
Payer: MEDICAID

## 2020-02-24 ENCOUNTER — APPOINTMENT (OUTPATIENT)
Dept: GENERAL RADIOLOGY | Age: 65
DRG: 263 | End: 2020-02-24
Payer: MEDICAID

## 2020-02-24 LAB
A/G RATIO: 1.1 (ref 1.1–2.2)
ALBUMIN SERPL-MCNC: 4 G/DL (ref 3.4–5)
ALP BLD-CCNC: 86 U/L (ref 40–129)
ALT SERPL-CCNC: 22 U/L (ref 10–40)
ANION GAP SERPL CALCULATED.3IONS-SCNC: 13 MMOL/L (ref 3–16)
AST SERPL-CCNC: 18 U/L (ref 15–37)
BACTERIA: ABNORMAL /HPF
BASOPHILS ABSOLUTE: 0.1 K/UL (ref 0–0.2)
BASOPHILS RELATIVE PERCENT: 0.6 %
BILIRUB SERPL-MCNC: <0.2 MG/DL (ref 0–1)
BILIRUBIN URINE: NEGATIVE
BLOOD, URINE: NEGATIVE
BUN BLDV-MCNC: 15 MG/DL (ref 7–20)
CALCIUM SERPL-MCNC: 9.9 MG/DL (ref 8.3–10.6)
CHLORIDE BLD-SCNC: 102 MMOL/L (ref 99–110)
CLARITY: ABNORMAL
CO2: 27 MMOL/L (ref 21–32)
COLOR: YELLOW
COMMENT UA: ABNORMAL
CREAT SERPL-MCNC: 1.2 MG/DL (ref 0.6–1.2)
EOSINOPHILS ABSOLUTE: 0.2 K/UL (ref 0–0.6)
EOSINOPHILS RELATIVE PERCENT: 1.8 %
EPITHELIAL CELLS, UA: 19 /HPF (ref 0–5)
GFR AFRICAN AMERICAN: 55
GFR NON-AFRICAN AMERICAN: 45
GLOBULIN: 3.5 G/DL
GLUCOSE BLD-MCNC: 160 MG/DL (ref 70–99)
GLUCOSE URINE: NEGATIVE MG/DL
HCT VFR BLD CALC: 39.3 % (ref 36–48)
HEMOGLOBIN: 13.1 G/DL (ref 12–16)
KETONES, URINE: 15 MG/DL
LEUKOCYTE ESTERASE, URINE: ABNORMAL
LIPASE: 31 U/L (ref 13–60)
LYMPHOCYTES ABSOLUTE: 3.4 K/UL (ref 1–5.1)
LYMPHOCYTES RELATIVE PERCENT: 32.5 %
MCH RBC QN AUTO: 29 PG (ref 26–34)
MCHC RBC AUTO-ENTMCNC: 33.3 G/DL (ref 31–36)
MCV RBC AUTO: 87 FL (ref 80–100)
MICROSCOPIC EXAMINATION: YES
MONOCYTES ABSOLUTE: 0.8 K/UL (ref 0–1.3)
MONOCYTES RELATIVE PERCENT: 7.4 %
NEUTROPHILS ABSOLUTE: 6.1 K/UL (ref 1.7–7.7)
NEUTROPHILS RELATIVE PERCENT: 57.7 %
NITRITE, URINE: NEGATIVE
PDW BLD-RTO: 13.8 % (ref 12.4–15.4)
PH UA: 7.5 (ref 5–8)
PLATELET # BLD: 180 K/UL (ref 135–450)
PMV BLD AUTO: 8.4 FL (ref 5–10.5)
POTASSIUM REFLEX MAGNESIUM: 4.2 MMOL/L (ref 3.5–5.1)
PRO-BNP: 73 PG/ML (ref 0–124)
PROTEIN UA: NEGATIVE MG/DL
RBC # BLD: 4.52 M/UL (ref 4–5.2)
RBC UA: 2 /HPF (ref 0–4)
SODIUM BLD-SCNC: 142 MMOL/L (ref 136–145)
SPECIFIC GRAVITY UA: 1.01 (ref 1–1.03)
TOTAL PROTEIN: 7.5 G/DL (ref 6.4–8.2)
TROPONIN: <0.01 NG/ML
URINE REFLEX TO CULTURE: YES
URINE TYPE: ABNORMAL
UROBILINOGEN, URINE: 0.2 E.U./DL
WBC # BLD: 10.5 K/UL (ref 4–11)
WBC UA: 6 /HPF (ref 0–5)

## 2020-02-24 PROCEDURE — 87077 CULTURE AEROBIC IDENTIFY: CPT

## 2020-02-24 PROCEDURE — 84484 ASSAY OF TROPONIN QUANT: CPT

## 2020-02-24 PROCEDURE — 87186 SC STD MICRODIL/AGAR DIL: CPT

## 2020-02-24 PROCEDURE — 85025 COMPLETE CBC W/AUTO DIFF WBC: CPT

## 2020-02-24 PROCEDURE — 2500000003 HC RX 250 WO HCPCS: Performed by: EMERGENCY MEDICINE

## 2020-02-24 PROCEDURE — 81001 URINALYSIS AUTO W/SCOPE: CPT

## 2020-02-24 PROCEDURE — 83690 ASSAY OF LIPASE: CPT

## 2020-02-24 PROCEDURE — 6360000002 HC RX W HCPCS: Performed by: EMERGENCY MEDICINE

## 2020-02-24 PROCEDURE — 80053 COMPREHEN METABOLIC PANEL: CPT

## 2020-02-24 PROCEDURE — 6360000004 HC RX CONTRAST MEDICATION: Performed by: EMERGENCY MEDICINE

## 2020-02-24 PROCEDURE — 96375 TX/PRO/DX INJ NEW DRUG ADDON: CPT

## 2020-02-24 PROCEDURE — 74177 CT ABD & PELVIS W/CONTRAST: CPT

## 2020-02-24 PROCEDURE — 99285 EMERGENCY DEPT VISIT HI MDM: CPT

## 2020-02-24 PROCEDURE — 83880 ASSAY OF NATRIURETIC PEPTIDE: CPT

## 2020-02-24 PROCEDURE — 93005 ELECTROCARDIOGRAM TRACING: CPT | Performed by: NURSE PRACTITIONER

## 2020-02-24 PROCEDURE — 71045 X-RAY EXAM CHEST 1 VIEW: CPT

## 2020-02-24 PROCEDURE — 2580000003 HC RX 258: Performed by: EMERGENCY MEDICINE

## 2020-02-24 PROCEDURE — 87086 URINE CULTURE/COLONY COUNT: CPT

## 2020-02-24 RX ORDER — 0.9 % SODIUM CHLORIDE 0.9 %
1000 INTRAVENOUS SOLUTION INTRAVENOUS ONCE
Status: COMPLETED | OUTPATIENT
Start: 2020-02-24 | End: 2020-02-25

## 2020-02-24 RX ADMIN — HYDROMORPHONE HYDROCHLORIDE 0.5 MG: 1 INJECTION, SOLUTION INTRAMUSCULAR; INTRAVENOUS; SUBCUTANEOUS at 22:50

## 2020-02-24 RX ADMIN — IOPAMIDOL 75 ML: 755 INJECTION, SOLUTION INTRAVENOUS at 23:16

## 2020-02-24 RX ADMIN — FAMOTIDINE 20 MG: 10 INJECTION INTRAVENOUS at 22:50

## 2020-02-24 RX ADMIN — SODIUM CHLORIDE 1000 ML: 9 INJECTION, SOLUTION INTRAVENOUS at 22:51

## 2020-02-24 ASSESSMENT — ENCOUNTER SYMPTOMS
COUGH: 0
BLOOD IN STOOL: 0
CONSTIPATION: 0
COLOR CHANGE: 0
ABDOMINAL PAIN: 1
DIARRHEA: 0
SHORTNESS OF BREATH: 0
NAUSEA: 1
ABDOMINAL DISTENTION: 0
WHEEZING: 0
VOMITING: 1

## 2020-02-24 ASSESSMENT — PAIN DESCRIPTION - PAIN TYPE: TYPE: ACUTE PAIN

## 2020-02-24 ASSESSMENT — PAIN SCALES - GENERAL
PAINLEVEL_OUTOF10: 10
PAINLEVEL_OUTOF10: 10

## 2020-02-24 ASSESSMENT — PAIN DESCRIPTION - DESCRIPTORS: DESCRIPTORS: PRESSURE

## 2020-02-24 ASSESSMENT — PAIN DESCRIPTION - LOCATION: LOCATION: BACK;CHEST

## 2020-02-25 ENCOUNTER — ANESTHESIA EVENT (OUTPATIENT)
Dept: OPERATING ROOM | Age: 65
DRG: 263 | End: 2020-02-25
Payer: MEDICAID

## 2020-02-25 ENCOUNTER — ANESTHESIA (OUTPATIENT)
Dept: OPERATING ROOM | Age: 65
DRG: 263 | End: 2020-02-25
Payer: MEDICAID

## 2020-02-25 ENCOUNTER — APPOINTMENT (OUTPATIENT)
Dept: GENERAL RADIOLOGY | Age: 65
DRG: 263 | End: 2020-02-25
Payer: MEDICAID

## 2020-02-25 VITALS
SYSTOLIC BLOOD PRESSURE: 134 MMHG | RESPIRATION RATE: 4 BRPM | DIASTOLIC BLOOD PRESSURE: 63 MMHG | OXYGEN SATURATION: 87 % | TEMPERATURE: 98.6 F

## 2020-02-25 PROBLEM — R07.9 CHEST PAIN: Status: ACTIVE | Noted: 2020-02-25

## 2020-02-25 LAB
EKG ATRIAL RATE: 95 BPM
EKG DIAGNOSIS: NORMAL
EKG P AXIS: 53 DEGREES
EKG P-R INTERVAL: 170 MS
EKG Q-T INTERVAL: 380 MS
EKG QRS DURATION: 102 MS
EKG QTC CALCULATION (BAZETT): 478 MS
EKG R AXIS: -25 DEGREES
EKG T AXIS: 44 DEGREES
EKG VENTRICULAR RATE: 95 BPM
TROPONIN: <0.01 NG/ML

## 2020-02-25 PROCEDURE — 2500000003 HC RX 250 WO HCPCS

## 2020-02-25 PROCEDURE — 2720000010 HC SURG SUPPLY STERILE: Performed by: SURGERY

## 2020-02-25 PROCEDURE — 74300 X-RAY BILE DUCTS/PANCREAS: CPT

## 2020-02-25 PROCEDURE — 94760 N-INVAS EAR/PLS OXIMETRY 1: CPT

## 2020-02-25 PROCEDURE — 84484 ASSAY OF TROPONIN QUANT: CPT

## 2020-02-25 PROCEDURE — 7100000001 HC PACU RECOVERY - ADDTL 15 MIN: Performed by: SURGERY

## 2020-02-25 PROCEDURE — 6370000000 HC RX 637 (ALT 250 FOR IP): Performed by: INTERNAL MEDICINE

## 2020-02-25 PROCEDURE — 94640 AIRWAY INHALATION TREATMENT: CPT

## 2020-02-25 PROCEDURE — 1200000000 HC SEMI PRIVATE

## 2020-02-25 PROCEDURE — 2500000003 HC RX 250 WO HCPCS: Performed by: NURSE ANESTHETIST, CERTIFIED REGISTERED

## 2020-02-25 PROCEDURE — 2580000003 HC RX 258: Performed by: ANESTHESIOLOGY

## 2020-02-25 PROCEDURE — 6360000002 HC RX W HCPCS: Performed by: SURGERY

## 2020-02-25 PROCEDURE — 3600000004 HC SURGERY LEVEL 4 BASE: Performed by: SURGERY

## 2020-02-25 PROCEDURE — 2580000003 HC RX 258: Performed by: SURGERY

## 2020-02-25 PROCEDURE — 96376 TX/PRO/DX INJ SAME DRUG ADON: CPT

## 2020-02-25 PROCEDURE — BF131ZZ FLUOROSCOPY OF GALLBLADDER AND BILE DUCTS USING LOW OSMOLAR CONTRAST: ICD-10-PCS | Performed by: SURGERY

## 2020-02-25 PROCEDURE — 3700000001 HC ADD 15 MINUTES (ANESTHESIA): Performed by: SURGERY

## 2020-02-25 PROCEDURE — 7100000000 HC PACU RECOVERY - FIRST 15 MIN: Performed by: SURGERY

## 2020-02-25 PROCEDURE — G0378 HOSPITAL OBSERVATION PER HR: HCPCS

## 2020-02-25 PROCEDURE — 2580000003 HC RX 258: Performed by: INTERNAL MEDICINE

## 2020-02-25 PROCEDURE — 2500000003 HC RX 250 WO HCPCS: Performed by: SURGERY

## 2020-02-25 PROCEDURE — 6360000002 HC RX W HCPCS: Performed by: EMERGENCY MEDICINE

## 2020-02-25 PROCEDURE — 96365 THER/PROPH/DIAG IV INF INIT: CPT

## 2020-02-25 PROCEDURE — 6370000000 HC RX 637 (ALT 250 FOR IP): Performed by: SURGERY

## 2020-02-25 PROCEDURE — 2580000003 HC RX 258: Performed by: EMERGENCY MEDICINE

## 2020-02-25 PROCEDURE — 3600000014 HC SURGERY LEVEL 4 ADDTL 15MIN: Performed by: SURGERY

## 2020-02-25 PROCEDURE — APPNB180 APP NON BILLABLE TIME > 60 MINS: Performed by: PHYSICIAN ASSISTANT

## 2020-02-25 PROCEDURE — 47563 LAPARO CHOLECYSTECTOMY/GRAPH: CPT | Performed by: SURGERY

## 2020-02-25 PROCEDURE — 6360000004 HC RX CONTRAST MEDICATION: Performed by: SURGERY

## 2020-02-25 PROCEDURE — 2709999900 HC NON-CHARGEABLE SUPPLY: Performed by: SURGERY

## 2020-02-25 PROCEDURE — 88304 TISSUE EXAM BY PATHOLOGIST: CPT

## 2020-02-25 PROCEDURE — 6370000000 HC RX 637 (ALT 250 FOR IP): Performed by: EMERGENCY MEDICINE

## 2020-02-25 PROCEDURE — 6360000002 HC RX W HCPCS: Performed by: NURSE PRACTITIONER

## 2020-02-25 PROCEDURE — 97161 PT EVAL LOW COMPLEX 20 MIN: CPT

## 2020-02-25 PROCEDURE — 2500000003 HC RX 250 WO HCPCS: Performed by: EMERGENCY MEDICINE

## 2020-02-25 PROCEDURE — 99223 1ST HOSP IP/OBS HIGH 75: CPT | Performed by: SURGERY

## 2020-02-25 PROCEDURE — 0FT44ZZ RESECTION OF GALLBLADDER, PERCUTANEOUS ENDOSCOPIC APPROACH: ICD-10-PCS | Performed by: SURGERY

## 2020-02-25 PROCEDURE — 6360000002 HC RX W HCPCS: Performed by: NURSE ANESTHETIST, CERTIFIED REGISTERED

## 2020-02-25 PROCEDURE — 36415 COLL VENOUS BLD VENIPUNCTURE: CPT

## 2020-02-25 PROCEDURE — 93010 ELECTROCARDIOGRAM REPORT: CPT | Performed by: INTERNAL MEDICINE

## 2020-02-25 PROCEDURE — 3700000000 HC ANESTHESIA ATTENDED CARE: Performed by: SURGERY

## 2020-02-25 PROCEDURE — APPSS180 APP SPLIT SHARED TIME > 60 MINUTES: Performed by: NURSE PRACTITIONER

## 2020-02-25 PROCEDURE — 96375 TX/PRO/DX INJ NEW DRUG ADDON: CPT

## 2020-02-25 RX ORDER — ONDANSETRON 2 MG/ML
INJECTION INTRAMUSCULAR; INTRAVENOUS PRN
Status: DISCONTINUED | OUTPATIENT
Start: 2020-02-25 | End: 2020-02-25 | Stop reason: SDUPTHER

## 2020-02-25 RX ORDER — FENTANYL CITRATE 50 UG/ML
INJECTION, SOLUTION INTRAMUSCULAR; INTRAVENOUS PRN
Status: DISCONTINUED | OUTPATIENT
Start: 2020-02-25 | End: 2020-02-25 | Stop reason: SDUPTHER

## 2020-02-25 RX ORDER — BUPIVACAINE HYDROCHLORIDE 5 MG/ML
INJECTION, SOLUTION EPIDURAL; INTRACAUDAL
Status: COMPLETED | OUTPATIENT
Start: 2020-02-25 | End: 2020-02-25

## 2020-02-25 RX ORDER — LIDOCAINE HYDROCHLORIDE 20 MG/ML
INJECTION, SOLUTION EPIDURAL; INFILTRATION; INTRACAUDAL; PERINEURAL PRN
Status: DISCONTINUED | OUTPATIENT
Start: 2020-02-25 | End: 2020-02-25 | Stop reason: SDUPTHER

## 2020-02-25 RX ORDER — LISINOPRIL 10 MG/1
10 TABLET ORAL DAILY
Status: DISCONTINUED | OUTPATIENT
Start: 2020-02-25 | End: 2020-02-26 | Stop reason: HOSPADM

## 2020-02-25 RX ORDER — ROCURONIUM BROMIDE 10 MG/ML
INJECTION, SOLUTION INTRAVENOUS PRN
Status: DISCONTINUED | OUTPATIENT
Start: 2020-02-25 | End: 2020-02-25 | Stop reason: SDUPTHER

## 2020-02-25 RX ORDER — BENZTROPINE MESYLATE 1 MG/1
1 TABLET ORAL DAILY
Status: DISCONTINUED | OUTPATIENT
Start: 2020-02-25 | End: 2020-02-25

## 2020-02-25 RX ORDER — ALBUTEROL SULFATE 90 UG/1
2 AEROSOL, METERED RESPIRATORY (INHALATION) EVERY 6 HOURS PRN
Status: DISCONTINUED | OUTPATIENT
Start: 2020-02-25 | End: 2020-02-26 | Stop reason: HOSPADM

## 2020-02-25 RX ORDER — OXYCODONE HYDROCHLORIDE 10 MG/1
10 TABLET ORAL EVERY 4 HOURS PRN
Status: DISCONTINUED | OUTPATIENT
Start: 2020-02-25 | End: 2020-02-26 | Stop reason: HOSPADM

## 2020-02-25 RX ORDER — SODIUM CHLORIDE 9 MG/ML
INJECTION, SOLUTION INTRAVENOUS CONTINUOUS
Status: DISCONTINUED | OUTPATIENT
Start: 2020-02-25 | End: 2020-02-25 | Stop reason: SDUPTHER

## 2020-02-25 RX ORDER — TAMOXIFEN CITRATE 10 MG/1
20 TABLET ORAL DAILY
Status: DISCONTINUED | OUTPATIENT
Start: 2020-02-25 | End: 2020-02-26 | Stop reason: HOSPADM

## 2020-02-25 RX ORDER — ESMOLOL HYDROCHLORIDE 10 MG/ML
INJECTION INTRAVENOUS PRN
Status: DISCONTINUED | OUTPATIENT
Start: 2020-02-25 | End: 2020-02-25 | Stop reason: SDUPTHER

## 2020-02-25 RX ORDER — SUCCINYLCHOLINE/SOD CL,ISO/PF 200MG/10ML
SYRINGE (ML) INTRAVENOUS PRN
Status: DISCONTINUED | OUTPATIENT
Start: 2020-02-25 | End: 2020-02-25 | Stop reason: SDUPTHER

## 2020-02-25 RX ORDER — MAGNESIUM HYDROXIDE 1200 MG/15ML
LIQUID ORAL CONTINUOUS PRN
Status: COMPLETED | OUTPATIENT
Start: 2020-02-25 | End: 2020-02-25

## 2020-02-25 RX ORDER — DEXAMETHASONE SODIUM PHOSPHATE 4 MG/ML
INJECTION, SOLUTION INTRA-ARTICULAR; INTRALESIONAL; INTRAMUSCULAR; INTRAVENOUS; SOFT TISSUE PRN
Status: DISCONTINUED | OUTPATIENT
Start: 2020-02-25 | End: 2020-02-25 | Stop reason: SDUPTHER

## 2020-02-25 RX ORDER — LABETALOL HYDROCHLORIDE 5 MG/ML
10 INJECTION, SOLUTION INTRAVENOUS ONCE
Status: COMPLETED | OUTPATIENT
Start: 2020-02-25 | End: 2020-02-25

## 2020-02-25 RX ORDER — SODIUM CHLORIDE 0.9 % (FLUSH) 0.9 %
10 SYRINGE (ML) INJECTION EVERY 12 HOURS SCHEDULED
Status: DISCONTINUED | OUTPATIENT
Start: 2020-02-25 | End: 2020-02-25 | Stop reason: SDUPTHER

## 2020-02-25 RX ORDER — SODIUM CHLORIDE 9 MG/ML
INJECTION, SOLUTION INTRAVENOUS CONTINUOUS
Status: DISCONTINUED | OUTPATIENT
Start: 2020-02-25 | End: 2020-02-25

## 2020-02-25 RX ORDER — GLYCOPYRROLATE 0.2 MG/ML
INJECTION INTRAMUSCULAR; INTRAVENOUS PRN
Status: DISCONTINUED | OUTPATIENT
Start: 2020-02-25 | End: 2020-02-25 | Stop reason: SDUPTHER

## 2020-02-25 RX ORDER — HYDROMORPHONE HYDROCHLORIDE 2 MG/1
1 TABLET ORAL EVERY 4 HOURS PRN
Status: DISCONTINUED | OUTPATIENT
Start: 2020-02-25 | End: 2020-02-26 | Stop reason: HOSPADM

## 2020-02-25 RX ORDER — ACETAMINOPHEN 325 MG/1
650 TABLET ORAL EVERY 6 HOURS PRN
Status: DISCONTINUED | OUTPATIENT
Start: 2020-02-25 | End: 2020-02-26 | Stop reason: HOSPADM

## 2020-02-25 RX ORDER — PROPOFOL 10 MG/ML
INJECTION, EMULSION INTRAVENOUS PRN
Status: DISCONTINUED | OUTPATIENT
Start: 2020-02-25 | End: 2020-02-25 | Stop reason: SDUPTHER

## 2020-02-25 RX ORDER — SODIUM CHLORIDE 0.9 % (FLUSH) 0.9 %
10 SYRINGE (ML) INJECTION EVERY 12 HOURS SCHEDULED
Status: DISCONTINUED | OUTPATIENT
Start: 2020-02-25 | End: 2020-02-25 | Stop reason: HOSPADM

## 2020-02-25 RX ORDER — SODIUM CHLORIDE 0.9 % (FLUSH) 0.9 %
10 SYRINGE (ML) INJECTION PRN
Status: DISCONTINUED | OUTPATIENT
Start: 2020-02-25 | End: 2020-02-26 | Stop reason: HOSPADM

## 2020-02-25 RX ORDER — SODIUM CHLORIDE 0.9 % (FLUSH) 0.9 %
10 SYRINGE (ML) INJECTION EVERY 12 HOURS SCHEDULED
Status: DISCONTINUED | OUTPATIENT
Start: 2020-02-25 | End: 2020-02-26 | Stop reason: HOSPADM

## 2020-02-25 RX ORDER — ASPIRIN 81 MG/1
324 TABLET, CHEWABLE ORAL ONCE
Status: COMPLETED | OUTPATIENT
Start: 2020-02-25 | End: 2020-02-25

## 2020-02-25 RX ORDER — SODIUM CHLORIDE 0.9 % (FLUSH) 0.9 %
10 SYRINGE (ML) INJECTION PRN
Status: DISCONTINUED | OUTPATIENT
Start: 2020-02-25 | End: 2020-02-25 | Stop reason: HOSPADM

## 2020-02-25 RX ORDER — PROMETHAZINE HYDROCHLORIDE 25 MG/ML
6.25 INJECTION, SOLUTION INTRAMUSCULAR; INTRAVENOUS EVERY 6 HOURS PRN
Status: DISCONTINUED | OUTPATIENT
Start: 2020-02-25 | End: 2020-02-25

## 2020-02-25 RX ORDER — SODIUM CHLORIDE 0.9 % (FLUSH) 0.9 %
10 SYRINGE (ML) INJECTION PRN
Status: DISCONTINUED | OUTPATIENT
Start: 2020-02-25 | End: 2020-02-25 | Stop reason: SDUPTHER

## 2020-02-25 RX ORDER — ZOLPIDEM TARTRATE 5 MG/1
5 TABLET ORAL NIGHTLY PRN
Status: DISCONTINUED | OUTPATIENT
Start: 2020-02-25 | End: 2020-02-26 | Stop reason: HOSPADM

## 2020-02-25 RX ORDER — OXYCODONE HYDROCHLORIDE 5 MG/1
5 TABLET ORAL EVERY 4 HOURS PRN
Status: DISCONTINUED | OUTPATIENT
Start: 2020-02-25 | End: 2020-02-26 | Stop reason: HOSPADM

## 2020-02-25 RX ORDER — HYDROCHLOROTHIAZIDE 25 MG/1
25 TABLET ORAL DAILY
Status: DISCONTINUED | OUTPATIENT
Start: 2020-02-25 | End: 2020-02-26 | Stop reason: HOSPADM

## 2020-02-25 RX ORDER — ACETAMINOPHEN 650 MG/1
650 SUPPOSITORY RECTAL EVERY 6 HOURS PRN
Status: DISCONTINUED | OUTPATIENT
Start: 2020-02-25 | End: 2020-02-26 | Stop reason: HOSPADM

## 2020-02-25 RX ORDER — BENZTROPINE MESYLATE 1 MG/1
1 TABLET ORAL 2 TIMES DAILY
Status: DISCONTINUED | OUTPATIENT
Start: 2020-02-25 | End: 2020-02-26 | Stop reason: HOSPADM

## 2020-02-25 RX ORDER — SODIUM CHLORIDE 9 MG/ML
INJECTION, SOLUTION INTRAVENOUS CONTINUOUS
Status: DISCONTINUED | OUTPATIENT
Start: 2020-02-25 | End: 2020-02-26 | Stop reason: HOSPADM

## 2020-02-25 RX ORDER — MIDAZOLAM HYDROCHLORIDE 1 MG/ML
INJECTION INTRAMUSCULAR; INTRAVENOUS PRN
Status: DISCONTINUED | OUTPATIENT
Start: 2020-02-25 | End: 2020-02-25 | Stop reason: SDUPTHER

## 2020-02-25 RX ORDER — PANTOPRAZOLE SODIUM 40 MG/1
40 TABLET, DELAYED RELEASE ORAL DAILY
Status: DISCONTINUED | OUTPATIENT
Start: 2020-02-25 | End: 2020-02-26 | Stop reason: HOSPADM

## 2020-02-25 RX ORDER — SERTRALINE HYDROCHLORIDE 100 MG/1
100 TABLET, FILM COATED ORAL DAILY
Status: DISCONTINUED | OUTPATIENT
Start: 2020-02-25 | End: 2020-02-26 | Stop reason: HOSPADM

## 2020-02-25 RX ADMIN — ASPIRIN 81 MG 324 MG: 81 TABLET ORAL at 02:37

## 2020-02-25 RX ADMIN — PANTOPRAZOLE SODIUM 40 MG: 40 TABLET, DELAYED RELEASE ORAL at 16:43

## 2020-02-25 RX ADMIN — TAMOXIFEN CITRATE 20 MG: 10 TABLET ORAL at 16:43

## 2020-02-25 RX ADMIN — PROPOFOL 200 MG: 10 INJECTION, EMULSION INTRAVENOUS at 13:49

## 2020-02-25 RX ADMIN — ESMOLOL HYDROCHLORIDE 30 MG: 100 INJECTION, SOLUTION INTRAVENOUS at 14:19

## 2020-02-25 RX ADMIN — HYDROMORPHONE HYDROCHLORIDE 1 MG: 2 TABLET ORAL at 02:37

## 2020-02-25 RX ADMIN — HYDROMORPHONE HYDROCHLORIDE 1 MG: 1 INJECTION, SOLUTION INTRAMUSCULAR; INTRAVENOUS; SUBCUTANEOUS at 23:33

## 2020-02-25 RX ADMIN — ESMOLOL HYDROCHLORIDE 20 MG: 100 INJECTION, SOLUTION INTRAVENOUS at 14:05

## 2020-02-25 RX ADMIN — CEFTRIAXONE 1 G: 1 INJECTION, POWDER, FOR SOLUTION INTRAMUSCULAR; INTRAVENOUS at 00:37

## 2020-02-25 RX ADMIN — ONDANSETRON 4 MG: 2 INJECTION INTRAMUSCULAR; INTRAVENOUS at 13:53

## 2020-02-25 RX ADMIN — ROCURONIUM BROMIDE 10 MG: 10 INJECTION INTRAVENOUS at 13:49

## 2020-02-25 RX ADMIN — GLYCOPYRROLATE 0.2 MG: 0.2 INJECTION, SOLUTION INTRAMUSCULAR; INTRAVENOUS at 13:53

## 2020-02-25 RX ADMIN — Medication 140 MG: at 13:49

## 2020-02-25 RX ADMIN — HYDROCHLOROTHIAZIDE 25 MG: 25 TABLET ORAL at 16:43

## 2020-02-25 RX ADMIN — GLYCOPYRROLATE AND FORMOTEROL FUMARATE 2 PUFF: 9; 4.8 AEROSOL, METERED RESPIRATORY (INHALATION) at 08:25

## 2020-02-25 RX ADMIN — GLYCOPYRROLATE AND FORMOTEROL FUMARATE 2 PUFF: 9; 4.8 AEROSOL, METERED RESPIRATORY (INHALATION) at 21:12

## 2020-02-25 RX ADMIN — SODIUM CHLORIDE: 9 INJECTION, SOLUTION INTRAVENOUS at 02:37

## 2020-02-25 RX ADMIN — OLANZAPINE 7.5 MG: 5 TABLET, FILM COATED ORAL at 20:53

## 2020-02-25 RX ADMIN — DEXAMETHASONE SODIUM PHOSPHATE 8 MG: 4 INJECTION, SOLUTION INTRAMUSCULAR; INTRAVENOUS at 13:53

## 2020-02-25 RX ADMIN — CEFTRIAXONE 1 G: 1 INJECTION, POWDER, FOR SOLUTION INTRAMUSCULAR; INTRAVENOUS at 23:34

## 2020-02-25 RX ADMIN — ESMOLOL HYDROCHLORIDE 20 MG: 100 INJECTION, SOLUTION INTRAVENOUS at 13:54

## 2020-02-25 RX ADMIN — HYDROMORPHONE HYDROCHLORIDE 0.5 MG: 1 INJECTION, SOLUTION INTRAMUSCULAR; INTRAVENOUS; SUBCUTANEOUS at 00:37

## 2020-02-25 RX ADMIN — HYDROMORPHONE HYDROCHLORIDE 1 MG: 2 TABLET ORAL at 06:38

## 2020-02-25 RX ADMIN — HYDROMORPHONE HYDROCHLORIDE 1 MG: 1 INJECTION, SOLUTION INTRAMUSCULAR; INTRAVENOUS; SUBCUTANEOUS at 14:42

## 2020-02-25 RX ADMIN — LABETALOL HYDROCHLORIDE 10 MG: 5 INJECTION INTRAVENOUS at 00:37

## 2020-02-25 RX ADMIN — HYDROMORPHONE HYDROCHLORIDE 1 MG: 1 INJECTION, SOLUTION INTRAMUSCULAR; INTRAVENOUS; SUBCUTANEOUS at 18:41

## 2020-02-25 RX ADMIN — SODIUM CHLORIDE: 9 INJECTION, SOLUTION INTRAVENOUS at 10:49

## 2020-02-25 RX ADMIN — FENTANYL CITRATE 100 MCG: 50 INJECTION INTRAMUSCULAR; INTRAVENOUS at 13:45

## 2020-02-25 RX ADMIN — LISINOPRIL 10 MG: 10 TABLET ORAL at 11:17

## 2020-02-25 RX ADMIN — BENZTROPINE MESYLATE 1 MG: 1 TABLET ORAL at 20:53

## 2020-02-25 RX ADMIN — ROCURONIUM BROMIDE 30 MG: 10 INJECTION INTRAVENOUS at 13:52

## 2020-02-25 RX ADMIN — HYDROMORPHONE HYDROCHLORIDE 1 MG: 1 INJECTION, SOLUTION INTRAMUSCULAR; INTRAVENOUS; SUBCUTANEOUS at 10:49

## 2020-02-25 RX ADMIN — LIDOCAINE HYDROCHLORIDE 100 MG: 20 INJECTION, SOLUTION EPIDURAL; INFILTRATION; INTRACAUDAL; PERINEURAL at 13:49

## 2020-02-25 RX ADMIN — MIDAZOLAM 2 MG: 1 INJECTION INTRAMUSCULAR; INTRAVENOUS at 13:45

## 2020-02-25 RX ADMIN — ROCURONIUM BROMIDE 10 MG: 10 INJECTION INTRAVENOUS at 14:14

## 2020-02-25 RX ADMIN — ROCURONIUM BROMIDE 10 MG: 10 INJECTION INTRAVENOUS at 14:05

## 2020-02-25 RX ADMIN — SERTRALINE 100 MG: 100 TABLET, FILM COATED ORAL at 16:43

## 2020-02-25 RX ADMIN — SUGAMMADEX 300 MG: 100 INJECTION, SOLUTION INTRAVENOUS at 14:37

## 2020-02-25 ASSESSMENT — PAIN DESCRIPTION - ORIENTATION
ORIENTATION: UPPER
ORIENTATION: LOWER
ORIENTATION: MID
ORIENTATION: UPPER

## 2020-02-25 ASSESSMENT — PAIN DESCRIPTION - DESCRIPTORS
DESCRIPTORS: SHARP
DESCRIPTORS: PRESSURE
DESCRIPTORS: PRESSURE
DESCRIPTORS: CONSTANT
DESCRIPTORS: STABBING

## 2020-02-25 ASSESSMENT — PAIN DESCRIPTION - PAIN TYPE
TYPE: ACUTE PAIN

## 2020-02-25 ASSESSMENT — PULMONARY FUNCTION TESTS
PIF_VALUE: 28
PIF_VALUE: 29
PIF_VALUE: 30
PIF_VALUE: 28
PIF_VALUE: 27
PIF_VALUE: 31
PIF_VALUE: 29
PIF_VALUE: 30
PIF_VALUE: 22
PIF_VALUE: 1
PIF_VALUE: 22
PIF_VALUE: 27
PIF_VALUE: 27
PIF_VALUE: 4
PIF_VALUE: 28
PIF_VALUE: 23
PIF_VALUE: 1
PIF_VALUE: 28
PIF_VALUE: 28
PIF_VALUE: 1
PIF_VALUE: 29
PIF_VALUE: 29
PIF_VALUE: 18
PIF_VALUE: 30
PIF_VALUE: 21
PIF_VALUE: 23
PIF_VALUE: 28
PIF_VALUE: 29
PIF_VALUE: 29
PIF_VALUE: 39
PIF_VALUE: 23
PIF_VALUE: 30
PIF_VALUE: 29
PIF_VALUE: 23
PIF_VALUE: 27
PIF_VALUE: 0
PIF_VALUE: 28
PIF_VALUE: 22
PIF_VALUE: 22
PIF_VALUE: 28
PIF_VALUE: 29
PIF_VALUE: 28
PIF_VALUE: 26
PIF_VALUE: 30
PIF_VALUE: 0
PIF_VALUE: 28
PIF_VALUE: 29
PIF_VALUE: 10
PIF_VALUE: 30
PIF_VALUE: 28
PIF_VALUE: 53
PIF_VALUE: 29
PIF_VALUE: 29
PIF_VALUE: 28
PIF_VALUE: 27
PIF_VALUE: 29
PIF_VALUE: 29
PIF_VALUE: 0
PIF_VALUE: 28

## 2020-02-25 ASSESSMENT — PAIN DESCRIPTION - LOCATION
LOCATION: ABDOMEN
LOCATION: ABDOMEN;BACK
LOCATION: ABDOMEN
LOCATION: ABDOMEN;BACK

## 2020-02-25 ASSESSMENT — PAIN SCALES - GENERAL
PAINLEVEL_OUTOF10: 0
PAINLEVEL_OUTOF10: 3
PAINLEVEL_OUTOF10: 2
PAINLEVEL_OUTOF10: 0
PAINLEVEL_OUTOF10: 10
PAINLEVEL_OUTOF10: 8
PAINLEVEL_OUTOF10: 0
PAINLEVEL_OUTOF10: 10
PAINLEVEL_OUTOF10: 8
PAINLEVEL_OUTOF10: 4
PAINLEVEL_OUTOF10: 7
PAINLEVEL_OUTOF10: 0

## 2020-02-25 ASSESSMENT — PAIN DESCRIPTION - PROGRESSION
CLINICAL_PROGRESSION: NOT CHANGED
CLINICAL_PROGRESSION: GRADUALLY IMPROVING

## 2020-02-25 ASSESSMENT — PAIN - FUNCTIONAL ASSESSMENT
PAIN_FUNCTIONAL_ASSESSMENT: ACTIVITIES ARE NOT PREVENTED
PAIN_FUNCTIONAL_ASSESSMENT: 0-10
PAIN_FUNCTIONAL_ASSESSMENT: ACTIVITIES ARE NOT PREVENTED
PAIN_FUNCTIONAL_ASSESSMENT: ACTIVITIES ARE NOT PREVENTED

## 2020-02-25 ASSESSMENT — PAIN DESCRIPTION - ONSET
ONSET: ON-GOING
ONSET: PROGRESSIVE

## 2020-02-25 ASSESSMENT — LIFESTYLE VARIABLES: SMOKING_STATUS: 1

## 2020-02-25 ASSESSMENT — PAIN DESCRIPTION - FREQUENCY
FREQUENCY: INTERMITTENT
FREQUENCY: CONTINUOUS

## 2020-02-25 ASSESSMENT — PAIN DESCRIPTION - DIRECTION
RADIATING_TOWARDS: BACK
RADIATING_TOWARDS: BACK

## 2020-02-25 NOTE — ANESTHESIA PRE PROCEDURE
Department of Anesthesiology  Preprocedure Note       Name:  Raquel Alba   Age:  60 y.o.  :  1955                                          MRN:  [id number]         Date:  2020      Surgeon: Osman Brower):  Joaquin Prieto MD    Procedure: LAPAROSCOPIC CHOLECYSTECTOMY WITH CHOLANGIOGRAMS POSSIBLE OPEN/ C-ARM (N/A Abdomen)    Medications prior to admission:   Prior to Admission medications    Medication Sig Start Date End Date Taking? Authorizing Provider   pantoprazole (PROTONIX) 40 MG tablet TAKE ONE TABLET BY MOUTH DAILY 10/25/19   Osei Muñoz, APRN - CNP   albuterol sulfate HFA (VENTOLIN HFA) 108 (90 Base) MCG/ACT inhaler Inhale 2 puffs into the lungs every 6 hours as needed for Wheezing 10/25/19   Osei Muñoz, APRN - CNP   pravastatin (PRAVACHOL) 10 MG tablet Take 1 tablet by mouth daily 10/25/19   Osei Muñoz, APRN - CNP   hydrochlorothiazide (HYDRODIURIL) 25 MG tablet Take 1 tablet by mouth daily 10/25/19   Osei Muñoz, APRN - CNP   lisinopril (PRINIVIL;ZESTRIL) 10 MG tablet Take 1 tablet by mouth daily 10/25/19   Osei Muñoz, APRN - CNP   umeclidinium-vilanterol [facility] ELLIPTA) 62.5-25 MCG/INH AEPB inhaler Inhale 1 puff into the lungs daily 10/25/19   Osei Muñoz, APRN - CNP   sertraline (ZOLOFT) 100 MG tablet Take 100 mg by mouth daily  4/3/19   Historical Provider, MD   benztropine (COGENTIN) 1 MG tablet Take 1 mg by mouth 2 times daily  19   Historical Provider, MD   tamoxifen (NOLVADEX) 20 MG tablet Take 20 mg by mouth daily  19   Historical Provider, MD   OLANZapine (ZYPREXA) 7.5 MG tablet Take 7.5 mg by mouth nightly     Historical Provider, MD   zolpidem (AMBIEN) 5 MG tablet Take 5 mg by mouth nightly as needed for Sleep. Simón Berger Historical Provider, MD       Current medications:    No current facility-administered medications for this visit. No current outpatient medications on file.      Facility-Administered flush 0.9 % injection 10 mL  10 mL Intravenous 2 times per day Michael Poe MD        sodium chloride flush 0.9 % injection 10 mL  10 mL Intravenous PRN Michael Poe MD        HYDROmorphone (DILAUDID) injection 0.5 mg  0.5 mg Intravenous Q3H PRN Dorothye Brew, APRN - CNP        Or    HYDROmorphone (DILAUDID) injection 1 mg  1 mg Intravenous Q3H PRN Dorothye Brew, APRN - CNP   1 mg at 02/25/20 1049       Allergies: Allergies   Allergen Reactions    Haldol [Haloperidol Lactate] Other (See Comments)     Lock jaw    Morphine Hives    Pcn [Penicillins] Hives       Problem List:    Patient Active Problem List   Diagnosis Code    Bipolar disorder, manic [facility] F31.10    Psychosis ([facility] [address] F29    Breast mass, right N63.10    Chest pain R07.9    Cholelithiasis and acute cholecystitis without obstruction K80.00       Past Medical History:        Diagnosis Date    Acid reflux     Bipolar disorder ([facility] [address]     Cervical polyp     COPD (chronic obstructive pulmonary disease) ([facility] [address]     Hyperlipidemia     Hypertension     Schizophrenia ([facility] [address]        Past Surgical History:        Procedure Laterality Date    BREAST BIOPSY Right 12/13/2018    MAMMOGRAM GUIDED NEEDLE LOCALIZED RIGHT BREAST EXCISIONAL BIOPSY performed by Yana Gaona MD at [address] [place] [address]    BREAST SURGERY Right 12/13/2018       Social History:    Social History     Tobacco Use    Smoking status: Current Every Day Smoker     Packs/day: 0.50     Years: 40.00     Pack years: 20.00     Types: Cigarettes     Start date: 6/21/1976    Smokeless tobacco: Never Used   Substance Use Topics    Alcohol use: No                                Ready to quit: Not Answered  Counseling given: Not Answered      Vital Signs (Current): There were no vitals filed for this visit.                                            BP Readings from Last 3 Encounters:   02/25/20 (!) 181/78   12/02/19 (!) 131/59   11/07/19 126/60       NPO Status:                                                                                 BMI:   Wt Readings from Last 3 Encounters:   02/25/20 272 lb 14.9 oz (123.8 kg)   12/02/19 270 lb (122.5 kg)   11/07/19 267 lb 6.4 oz (121.3 kg)     There is no height or weight on file to calculate BMI.    CBC:   Lab Results   Component Value Date    WBC 10.5 02/24/2020    RBC 4.52 02/24/2020    HGB 13.1 02/24/2020    HCT 39.3 02/24/2020    MCV 87.0 02/24/2020    RDW 13.8 02/24/2020     02/24/2020       CMP:   Lab Results   Component Value Date     02/24/2020    K 4.2 02/24/2020     02/24/2020    CO2 27 02/24/2020    BUN 15 02/24/2020    CREATININE 1.2 02/24/2020    GFRAA 55 02/24/2020    AGRATIO 1.1 02/24/2020    LABGLOM 45 02/24/2020    GLUCOSE 160 02/24/2020    PROT 7.5 02/24/2020    CALCIUM 9.9 02/24/2020    BILITOT <0.2 02/24/2020    ALKPHOS 86 02/24/2020    AST 18 02/24/2020    ALT 22 02/24/2020       POC Tests: No results for input(s): POCGLU, POCNA, POCK, POCCL, POCBUN, POCHEMO, POCHCT in the last 72 hours.     Coags: No results found for: PROTIME, INR, APTT    HCG (If Applicable): No results found for: PREGTESTUR, PREGSERUM, HCG, HCGQUANT     ABGs: No results found for: PHART, PO2ART, WJQ7GLO, GFO1TFC, BEART, C6LIUBRQ     Type & Screen (If Applicable):  No results found for: LABABO, [address]    Anesthesia Evaluation  Patient summary reviewed no history of anesthetic complications:   Airway: Mallampati: IV  TM distance: >3 FB   Neck ROM: full  Mouth opening: > = 3 FB and < 3 FB Dental:    (+) upper dentures      Pulmonary: breath sounds clear to auscultation  (+) COPD:  current smoker    (-) pneumonia                           Cardiovascular:  Exercise tolerance: good (>4 METS),   (+) hypertension:, hyperlipidemia    (-) past MI      Rhythm: regular  Rate: normal                    Neuro/Psych:   (+) psychiatric history: stable with treatmentdepression/anxiety    (-) seizures, TIA and CVA           GI/Hepatic/Renal:   (+) GERD:, morbid obesity          Endo/Other: Negative Endo/Other ROS                    Abdominal:   (+) obese,         Vascular:     - DVT and PE. Anesthesia Plan      general     ASA 3       Induction: intravenous. MIPS: Postoperative opioids intended and Prophylactic antiemetics administered. Anesthetic plan and risks discussed with patient. Plan discussed with CRNA. Attending anesthesiologist reviewed and agrees with Pre Eval content        DOS STAFF ADDENDUM:    Pt seen and examined, chart reviewed (including anesthesia, drug and allergy history). No interval changes to history and physical examination. Anesthetic plan, risks, benefits, alternatives, and personnel involved discussed with patient. Patient verbalized an understanding and agrees to proceed.       Jerson Dudley MD  February 25, 2020  11:57 AM          Jerson Dudley MD   2/25/2020

## 2020-02-25 NOTE — PROGRESS NOTES
Currently in Pain: Yes  Pain Assessment: 0-10  Pain Level: 0  Patient's Stated Pain Goal: No pain  Pain Type: Acute pain  Pain Location: Abdomen, Back(epigastric pain, bilateral lower abdominal pain)  Pain Orientation: Upper  Pain Radiating Towards: back  Pain Descriptors: Pressure  Non-Pharmaceutical Pain Intervention(s): Rest  Response to Pain Intervention: Asleep with RR greater than 10     Orientation  Overall Orientation Status: Within Functional Limits  Orientation Level: Oriented X4  Cognition        Social/Functional History  Social/Functional History  Lives With: Alone  Type of Home: Apartment(2nd floor)  Home Access: Stairs to enter with rails  Entrance Stairs - Number of Steps: couple flights up from basement  Entrance Stairs - Rails: Left  Bathroom Shower/Tub: Tub/Shower unit(does not use shower chair)  Bathroom Toilet: Handicap height  Bathroom Equipment: Shower chair  Home Equipment: (no DME)  ADL Assistance: Independent  Homemaking Assistance: Independent  Ambulation Assistance: Independent(denies recent falls)  Transfer Assistance: Independent  Active : Yes(pt does her own grocery shopping; dtr available for this sometimes)  Mode of Transportation: Car       OBJECTIVE:  ROM  RLE PROM: WFL     LLE PROM: [facility]       STRENGTH  Strength RLE: WFL  Comment: not formally examined but WFL for transfers/gait/bed mobility     Strength LLE: WFL  Comment: not formally examined but WFL for transfers/gait/bed mobility    Bed mobility  Supine to Sit: Independent  Sit to Supine: Independent    Transfers  Sit to Stand: Independent  Stand to sit:  Independent    Ambulation  Ambulation  Ambulation?: Yes  Ambulation 1  Device: No Device  Assistance: Independent  Quality of Gait: wide base of support, slow speed, steady gait, guarded trunk with no rotation and minimal arm swing  Distance: 20'    Stairs/Curb  Stairs/Curb  Stairs?: No     Balance  Balance  Sitting - Static: Good  Standing - Static: Good    Other Activities:  Comment: Pt able to don hospital sock on R foot, assist to don sock on L foot. ASSESSMENT:   Assessment: Bart Jones is a 60 y.o. F admit 2/25/20 with epigastric pain. Abdominal CT showed acute cholecystitis. UA suggestive of UTI. Awaiting cardiac clearance for possible cholecystectomy. Prior to admit pt was living alone in an apartment and ambulating independently. Today she ambulated and transferred independently in the room. Will sign off as patient appears to be at her baseline and safe to return home with PRN assist from her dtr. Please re-order if physical therapy needs emerge. Treatment Diagnosis: Abdominal pain  Prognosis: Good  Decision Making: Low Complexity  History:  has a past medical history of Acid reflux, Bipolar disorder ([facility] [address] Cervical polyp, COPD (chronic obstructive pulmonary disease) ([facility] [address] Hyperlipidemia, Hypertension, and Schizophrenia ([facility] [address] Exam: Abdominal pain  Clinical Presentation: Evolving pain levels  Barriers to Learning: None  REQUIRES PT FOLLOW UP: No  Discharge Recommendations: Home with assist PRN    Glo Uribe scored a 23/24 on the AM-PAC short mobility form. Initial research suggests that an AM-PAC score of 18 or greater may be associated with a discharge to patient's home setting. However in this initial research, cut off scores do not perfectly predict discharge location: 25% of patients with a score of 18 or greater actually went to a rehab facility and 20% of people with a score less than 18 actually went home. Based on my clinical judgement I recommend that the patient have no Physical Therapy at d/c.     Safety devices:   Type of devices: Call light within reach, Nurse notified, Left in bed(pt up ad vishal)        PLAN:  Times per week: d/c acute PT;    Current Treatment Recommendations: Functional Mobility Training     OutComes Score  How much difficulty turning over in bed?: None  How much difficulty sitting down on / standing up from a chair with arms?: None  How much difficulty moving from lying on back to sitting on side of bed?: None  How much help from another person moving to and from a bed to a chair?: None  How much help from another person needed to walk in hospital room?: None  How much help from another person for climbing 3-5 steps with a railing?: A Little  AM-PAC Inpatient Mobility Raw Score : 23  AM-PAC Inpatient T-Scale Score : 56.93  Mobility Inpatient CMS 0-100% Score: 11.2  Mobility Inpatient CMS G-Code Modifier : CI       Goals  Patient Goals   Patient goals : no goals secondary to initial eval d/c                            Therapy Time    Individual Concurrent Group Co-treatment   Time In 0915            Time Out 0930          Minutes 15             Timed Code Treatment Minutes: 0 Minutes      Lou Mcclellan PT

## 2020-02-25 NOTE — PROGRESS NOTES
Pharmacy Medication Reconciliation Note     List of medications patient is currently taking is complete. Source of information:   1. Pt, conversation at bedside   2. Fill hx, Epic Notes     Allergies   Allergen Reactions    Haldol [Haloperidol Lactate] Other (See Comments)     Lock jaw    Morphine Hives    Pcn [Penicillins] Hives       Notes regarding home medications:   1. See Dr Nicola Sharma, psychiatry. Prescribed   Zoloft 100 mg daily, Cogentin 1 mg BID, Zyprexa 7.5 mg HS and Ambien 5 mg   2.  Fills at [facility]  2/25/2020  8:20 AM

## 2020-02-25 NOTE — ED PROVIDER NOTES
Psychiatric/Behavioral: Negative for confusion. All other systems reviewed and are negative. Positives and Pertinent negatives as per HPI. Except as noted above in the ROS, all other systems were reviewed and negative. PAST MEDICAL HISTORY     Past Medical History:   Diagnosis Date    Acid reflux     [facility] disorder ([facility] [address]     Cervical polyp     COPD (chronic obstructive pulmonary disease) ([facility] [address]     Hyperlipidemia     Hypertension     Schizophrenia ([facility] [address]          SURGICAL HISTORY     Past Surgical History:   Procedure Laterality Date    BREAST BIOPSY Right 12/13/2018    MAMMOGRAM GUIDED NEEDLE LOCALIZED RIGHT BREAST EXCISIONAL BIOPSY performed by Keke Crooks MD at [facility] Right 12/13/2018    CHOLECYSTECTOMY, LAPAROSCOPIC N/A 2/25/2020    LAPAROSCOPIC CHOLECYSTECTOMY WITH CHOLANGIOGRAMS POSSIBLE OPEN/ C-ARM performed by Sarah Cruz MD at [address] [place] [address]       Current Discharge Medication List      CONTINUE these medications which have NOT CHANGED    Details   pantoprazole (PROTONIX) 40 MG tablet TAKE ONE TABLET BY MOUTH DAILY  Qty: 30 tablet, Refills: 5      albuterol sulfate HFA (VENTOLIN HFA) 108 (90 Base) MCG/ACT inhaler Inhale 2 puffs into the lungs every 6 hours as needed for Wheezing  Qty: 1 Inhaler, Refills: 5      pravastatin (PRAVACHOL) 10 MG tablet Take 1 tablet by mouth daily  Qty: 30 tablet, Refills: 5      hydrochlorothiazide (HYDRODIURIL) 25 MG tablet Take 1 tablet by mouth daily  Qty: 30 tablet, Refills: 5      lisinopril (PRINIVIL;ZESTRIL) 10 MG tablet Take 1 tablet by mouth daily  Qty: 30 tablet, Refills: 5    Comments: Patient is not sure if she has been taking this. Please review her meds with her, as she needs it for her blood pressure.       umeclidinium-vilanterol (ANORO ELLIPTA) 62.5-25 MCG/INH AEPB inhaler Inhale 1 puff into the lungs daily  Qty: 1 each, Refills: 5    Comments: toxic-appearing. HENT:      Head: Normocephalic and atraumatic. Eyes:      General: No scleral icterus. Conjunctiva/sclera: Conjunctivae normal.   Neck:      Musculoskeletal: Normal range of motion. Vascular: No JVD. Cardiovascular:      Rate and Rhythm: Normal rate and regular rhythm. Heart sounds: No murmur. No friction rub. No gallop. Comments: Hypertensive when retching  Pulmonary:      Effort: Pulmonary effort is normal. No respiratory distress. Breath sounds: Normal breath sounds. Abdominal:      General: Bowel sounds are normal. There is no distension. Palpations: Abdomen is soft. Abdomen is not rigid. Tenderness: There is abdominal tenderness in the epigastric area. There is no guarding or rebound. Musculoskeletal: Normal range of motion. Skin:     General: Skin is warm and dry. Capillary Refill: Capillary refill takes less than 2 seconds. Findings: No rash. Neurological:      General: No focal deficit present. Mental Status: She is alert and oriented to person, place, and time.    Psychiatric:         Mood and Affect: Mood normal.         DIAGNOSTIC RESULTS   LABS:    Labs Reviewed   COMPREHENSIVE METABOLIC PANEL W/ REFLEX TO MG FOR LOW K - Abnormal; Notable for the following components:       Result Value    Glucose 160 (*)     GFR Non- 45 (*)     GFR  55 (*)     All other components within normal limits    Narrative:     Performed at:  [facility]  [address] [place], [place] [address]   Phone [phone]   URINE RT REFLEX TO CULTURE - Abnormal; Notable for the following components:    Clarity, UA CLOUDY (*)     Ketones, Urine 15 (*)     Leukocyte Esterase, Urine TRACE (*)     All other components within normal limits    Narrative:     Performed at:  [facility]  [address] [place], [place] [address]   Phone [phone] MICROSCOPIC URINALYSIS - Abnormal; Notable for the following components:    Bacteria, UA RARE (*)     WBC, UA 6 (*)     Epi Cells 19 (*)     All other components within normal limits    Narrative:     Performed at:  [facility]  [address], [place] [facility] [place] [address]   Phone [phone]   BASIC METABOLIC PANEL W/ REFLEX TO MG FOR LOW K - Abnormal; Notable for the following components:    Glucose 193 (*)     All other components within normal limits    Narrative:     Collection has been rescheduled by [facility] at 02/26/2020 07:46 Reason:   Failed attempt at venipuncture  Performed at:  [facility]  [address], [place] [facility] [place] [address]   Phone [phone]   CBC - Abnormal; Notable for the following components:    WBC 11.6 (*)     All other components within normal limits    Narrative:     Collection has been rescheduled by [facility] at 02/26/2020 07:46 Reason:   Failed attempt at venipuncture  Performed at:  [facility]  [address], [place] [facility] [address]   Phone [phone]   RENAL FUNCTION PANEL - Abnormal; Notable for the following components:    Glucose 195 (*)     GFR Non- 56 (*)     All other components within normal limits    Narrative:     Collection has been rescheduled by [employer] [facility] at 02/26/2020 08:47 Reason: Add   on  Performed at:  [facility]  [address], [place] [facility] [address]   Phone [phone]   CULTURE, URINE   CBC WITH AUTO DIFFERENTIAL    Narrative:     Performed at:  [facility]  [address], [place] [facility] [email or web address] [address]   Phone [phone]   BRAIN NATRIURETIC PEPTIDE    Narrative:     Performed at:  [facility]  [address], [place] [facility] [place] [address]   Phone [phone]   TROPONIN    Narrative:     Performed at:  [facility]  [address] [place] [address]   Phone [phone]   LIPASE    Narrative:     added on to earlier labs 22:12  02/24/2020  Performed at:  [facility]  [address] [place] [address]   Phone [phone]   TROPONIN    Narrative:     Performed at:  [facility]  [address] [place] [address]   Phone [phone]   SURGICAL PATHOLOGY   SURGICAL PATHOLOGY       All other labs were within normal range or not returned as of this dictation. EKG: All EKG's are interpreted by the Emergency Department Physician in the absence of a cardiologist.  Please see their note for interpretation of EKG. RADIOLOGY:   Non-plain film images such as CT, Ultrasound and MRI are read by the radiologist. Plain radiographic images are visualized and preliminarily interpreted by the ED Provider with the below findings:        Interpretation per the Radiologist below, if available at the time of this note:    FL CHOLANGIOGRAM OR   Preliminary Result   No evidence of choledocholithiasis. CT CHEST ABDOMEN PELVIS W CONTRAST   Final Result   1. No evidence of thoracic aorta dissection or aneurysm formation. 2. No evidence of pulmonary embolus   3. Mild-to-moderate degree of atherosclerotic plaque formation involving the   abdominal aorta, with a focal area of approximately 50-70% diameter narrowing   vomiting the infrarenal abdominal aorta. 4. Cholelithiasis, with mild pericholecystic inflammation, consistent with   acute cholecystitis      RECOMMENDATIONS:   Dedicated CTA of the abdominal aorta would be helpful to further evaluate the   degree of luminal stenosis         XR CHEST PORTABLE   Final Result   No acute cardiopulmonary process. Xr Chest Portable    Result Date: 2/24/2020  EXAMINATION: ONE XRAY VIEW OF THE CHEST 2/24/2020 9:06 pm COMPARISON: None.  HISTORY: ORDERING SYSTEM PROVIDED HISTORY: Judson Jones, ОЛЬГА - CNP (electronically signed)            ОЛЬГА [place] - CNP  02/26/20 8153

## 2020-02-25 NOTE — OP NOTE
Laparoscopic Cholecystectomy Operative Report      Patient: Bucky Costello MRN: [id number]     YOB: 1955  Age: 60 y.o. Sex: female        Primary Care Physician: ОЛЬГА Chandler - CNP         DATE OF OPERATION: 2/25/2020     PREOPERATIVE DIAGNOSIS: acute cholecystitis    POSTOPERATIVE DIAGNOSIS: severe acute cholecystitis    PROCEDURE PERFORMED: Laparoscopic cholecystectomy with cholangiogram    SURGEON: Reg Church MD    ANESTHESIA: GETA with local anesthetic    ASA CLASS: 3    DVT PROPHYLAXIS: BLE pneumatic compression devices    ANTIBIOTICS: Rocephin IV    INDICATIONS: Symptomatic gallbladder disease. The patient has a CT that does show gallstones and cholecystitis. She was cleared by the hospitalist for possible prolonged QT on EKG  The patient's symptoms were felt to be secondary to gallbladder disease and they electively for removal after the risks, benefits and alternatives were discussed with them. Procedure Details:       After informed consent was obtained, the patient was brought to the operating room and placed on the table in the supine position. Preoperative antibiotics were given. Once under general endotracheal anesthesia, the abdomen was prepped and sterilely draped in the standard fashion. A time-out was performed for patient and procedure verification. A small periumbilical incision was made just at the umbilicus and a Veress needle inserted into peritoneal cavity. The abdomen was insufflated with carbon dioxide to a pressure of 15 mmHg. A 5mm trocar was introduced and a camera placed. Under direct vision, a 10mm port was placed in the subxiphoid location and two 5 mm ports placed in the right upper quadrant. The dome of the gallbladder was grasped and elevated up and over the liver. The patient had significant inflammation and adhesions. We bluntly took down the peritoneum over the infundibulum of the gallbladder.   The infundibulum was then grasped and

## 2020-02-25 NOTE — CONSULTS
[address] [facility]  [phone]        Surgery Consult      Pt Name: Dileep Sierra  MRN: [id number]  YOB: 1955  Date of evaluation: 2/25/2020  Primary Care Physician: ОЛЬГА Zambrano - CNP    Chief Complaint   Patient presents with    Chest Pain     Started a few hours ago    Hypertension         Assessment/Plan   Acute cholecystitis  -lap yas  -continue NPO, IVF    Prolonged QT  -Per cardiology cleared for surgery      Bipolar, schizophrenia  -psych consult ordered per PCP      EDUCATION  Patient educated about the following as pertinent to medical/surgical problems: Disease Process, Medications, Smoking Cessation, Oxygenation, Incentive Spirometry and Deep Breath and Cough, Diabetes, Hyperlipidemia, Smoking Cessation, Nutrition, Exercise and Hypertension    SUBJECTIVE:   History of Chief Complaint:    Dileep Sierra is a 60 y.o. female who presents with RUQ abdominal pain. Took pepto bismol Sunday which helped a bit. Had some chili last night and had recurrent of pain with nausea and vomiting. No prior abdominal surgeries. Trying to quit smoking, smokes 1/2 PPD. Using chantix. CT cholelithiasis with mild pericholecystic inflammation c/w acute cholecystitis. Mild to moderate atherosclerotic plaque abdominal aorta, 50-70 % diameter narrowing infrarenal abdominal aorta. Past Medical History   has a past medical history of Acid reflux, Bipolar disorder ([address] Cervical polyp, COPD (chronic obstructive pulmonary disease) ([address] Hyperlipidemia, Hypertension, and Schizophrenia ([address] Past Surgical History   has a past surgical history that includes Breast surgery; Breast surgery (Right, 12/13/2018); and Breast biopsy (Right, 12/13/2018). Medications  Prior to Admission medications    Medication Sig Start Date End Date Taking?  Authorizing Provider   pantoprazole (PROTONIX) 40 MG tablet TAKE ONE TABLET BY MOUTH DAILY 10/25/19  Yes Estefanía Walker APRN - CNP   albuterol sulfate HFA (VENTOLIN HFA) 108 (90 Base) MCG/ACT inhaler Inhale 2 puffs into the lungs every 6 hours as needed for Wheezing 10/25/19  Yes ОЛЬГА Thompson CNP   pravastatin (PRAVACHOL) 10 MG tablet Take 1 tablet by mouth daily 10/25/19  Yes ОЛЬГА Thompson CNP   hydrochlorothiazide (HYDRODIURIL) 25 MG tablet Take 1 tablet by mouth daily 10/25/19  Yes ОЛЬГА Thompson CNP   lisinopril (PRINIVIL;ZESTRIL) 10 MG tablet Take 1 tablet by mouth daily 10/25/19  Yes ОЛЬГА Thompson CNP   umeclidinium-vilanterol [facility] ELLIPTA) 62.5-25 MCG/INH AEPB inhaler Inhale 1 puff into the lungs daily 10/25/19  Yes ОЛЬГА Thompson CNP   sertraline (ZOLOFT) 100 MG tablet Take 100 mg by mouth daily  4/3/19  Yes Historical Provider, MD   benztropine (COGENTIN) 1 MG tablet Take 1 mg by mouth 2 times daily  2/19/19  Yes Historical Provider, MD   tamoxifen (NOLVADEX) 20 MG tablet Take 20 mg by mouth daily  2/12/19  Yes Historical Provider, MD   OLANZapine (ZYPREXA) 7.5 MG tablet Take 7.5 mg by mouth nightly    Yes Historical Provider, MD   zolpidem (AMBIEN) 5 MG tablet Take 5 mg by mouth nightly as needed for Sleep. .   Yes Historical Provider, MD    Scheduled Meds:   sodium chloride flush  10 mL Intravenous 2 times per day    enoxaparin  40 mg Subcutaneous Daily    [START ON 2/26/2020] cefTRIAXone (ROCEPHIN) IV  1 g Intravenous Q24H    hydroCHLOROthiazide  25 mg Oral Daily    lisinopril  10 mg Oral Daily    pantoprazole  40 mg Oral Daily    tamoxifen  20 mg Oral Daily    glycopyrrolate-formoterol  2 puff Inhalation BID    benztropine  1 mg Oral BID    OLANZapine  7.5 mg Oral Nightly    sertraline  100 mg Oral Daily     Continuous Infusions:   sodium chloride 125 mL/hr at 02/25/20 0237     PRN Meds:.sodium chloride flush, acetaminophen, acetaminophen, HYDROmorphone, albuterol sulfate HFA, zolpidem    Allergies  is allergic to haldol [haloperidol lactate]; morphine; and pcn [penicillins]. Family History  Reviewed, non contribtory  family history includes Heart Failure in her mother and sister; Hypertension in her mother; Lung Cancer in her sister; Mult Sclerosis in her father; No Known Problems in her maternal grandfather, maternal grandmother, paternal grandfather, and paternal grandmother; Other in her sister. Social History  Reviewed, non contributory   reports that she has been smoking cigarettes. She started smoking about 43 years ago. She has a 20.00 pack-year smoking history. She has never used smokeless tobacco. She reports that she does not drink alcohol or use drugs. Review of Systems:  General Denies any fever or chills  HEENT Denies any diplopia, tinnitus or vertigo  Resp Denies any shortness of breath, cough or wheezing  Cardiac Denies any chest pain, palpitations, claudication or edema  GI Denies any melena, hematochezia, hematemesis or pyrosis   Denies any frequency, urgency, hesitancy or incontinence  Heme Denies bruising or bleeding easily  Endocrine Denies any history of diabetes or thyroid disease  Neuro Denies any focal motor or sensory deficits    OBJECTIVE:   VITALS:  height is 5' 3\" (1.6 m) and weight is 272 lb 14.9 oz (123.8 kg). Her oral temperature is 98 °F (36.7 °C). Her blood pressure is 175/89 (abnormal) and her pulse is 87. Her respiration is 18 and oxygen saturation is 95%. CONSTITUTIONAL: Alert and oriented times 3, no acute distress and cooperative to examination with proper mood and affect. SKIN: Skin color, texture, turgor normal. No rashes or lesions. LYMPH: no cervical nodes, no inguinal nodes  HEENT: Head is normocephalic, atraumatic. EOMI, PERRLA.   NECK: Supple, symmetrical, trachea midline, no adenopathy, thyroid symmetric, not enlarged and no tenderness, skin normal.  CHEST/LUNGS: chest symmetric with normal A/P diameter, normal respiratory rate and rhythm, lungs clear to auscultation without wheezes, rales or rhonchi. No accessory muscle use. CARDIOVASCULAR: Heart sounds are normal.  Regular rate and rhythm without murmur, gallop or rub. Carotid and femoral pulses 2+/4 and equal bilaterally. ABDOMEN: Soft, RUQ tenderness, + lopez's   RECTAL: deferred, not clinically indicated  NEUROLOGIC: There are no focalizing motor or sensory deficits. CN II-XII are grossly intact. Simón Brooking EXTREMITIES: no cyanosis, no clubbing and no edema. LABS:     Recent Labs     02/24/20 2103 02/24/20 2310 02/24/20  2317   WBC 10.5  --   --    HGB 13.1  --   --    HCT 39.3  --   --      --   --      --   --    K 4.2  --   --      --   --    CO2 27  --   --    BUN 15  --   --    CREATININE 1.2  --   --    CALCIUM 9.9  --   --    AST 18  --   --    ALT 22  --   --    BILITOT <0.2  --   --    NITRU  --  Negative  --    COLORU  --  YELLOW  --    BACTERIA  --   --  RARE*     Recent Labs     02/24/20 2103   ALKPHOS 86   ALT 22   AST 18   BILITOT <0.2   LABALBU 4.0   LIPASE 31.0         RADIOLOGY:   I have personally reviewed the following films:  CT CHEST ABDOMEN PELVIS W CONTRAST   Final Result   1. No evidence of thoracic aorta dissection or aneurysm formation. 2. No evidence of pulmonary embolus   3. Mild-to-moderate degree of atherosclerotic plaque formation involving the   abdominal aorta, with a focal area of approximately 50-70% diameter narrowing   vomiting the infrarenal abdominal aorta. 4. Cholelithiasis, with mild pericholecystic inflammation, consistent with   acute cholecystitis      RECOMMENDATIONS:   Dedicated CTA of the abdominal aorta would be helpful to further evaluate the   degree of luminal stenosis         XR CHEST PORTABLE   Final Result   No acute cardiopulmonary process. Thank you for the interesting evaluation. Further recommendations to follow.       Electronically signed by ОЛЬГА Milton CNP on 2/25/2020 at 9:47 AM      Surgery Staff  I have

## 2020-02-25 NOTE — PROGRESS NOTES
Pt admitted to room 4281. Alert and oriented x 4, gait steady. VS stable. Oriented to room and new orders. Call light in reach. Will continue to monitor.

## 2020-02-25 NOTE — PLAN OF CARE
Pt seen and examined. She was admitted after midnight by one of my partners. She is still with RUQ and chest pain today. She reports chest pain as sharp and started after several bouts of emesis. She has no prior cardiac Hx and has no signs or symptoms of angina. Her EKG shows no acute ischemia and her troponin has been negative x2 - so with symptoms ongoing since Sunday and well into 48hrs after onset - ACS is considered to be ruled out. No further cardiac testing will be necessary at this time. Ok to proceed with cholecystectomy as planned.      Luke Dunn  2/25/2020  11:40 AM

## 2020-02-25 NOTE — PROGRESS NOTES
Pt awake and alert. Denies pain. Handoff given to [employer], RN. Ready for transfer at this time.

## 2020-02-25 NOTE — ED NOTES
Diego Mortons, daughter would like to be updated with plan of care or questionable things. [phone].      Armond Adames RN  02/25/20 0020

## 2020-02-25 NOTE — PROGRESS NOTES
Pre-op has called several times this AM to see if cardiac clearance has been obtained. Spoke with Faheem Mayer from [facility] who will contact cardiologist and update.

## 2020-02-25 NOTE — PROGRESS NOTES
Occupational Therapy  Status Note    Bucky Trang  2/25/2020  [id number]    OT orders noted. Pt met bedside. Pt seated EOB with family member in room. Per pt she plans to have procedure completed this afternoon. Per physical therapist note pt is completing mobility tasks independently. Pt reports she has been able to complete self-care thus far in hospital room. Pt reports no questions or concerns at this time. Discussed possible need for re-ordering therapy following procedure however unsure if patient will need it, will sign off at this time.      Electronically signed by BALJIT Crandall/L#[id number] on 2/25/2020 at 11:26 AM

## 2020-02-25 NOTE — PROGRESS NOTES
4 Eyes Skin Assessment     The patient is being assess for  Admission    I agree that 2 RN's have performed a thorough Head to Toe Skin Assessment on the patient. ALL assessment sites listed below have been assessed. Areas assessed by both nurses:   [x]   Head, Face, and Ears   [x]   Shoulders, Back, and Chest  [x]   Arms, Elbows, and Hands   [x]   Coccyx, Sacrum, and IschIum  [x]   Legs, Feet, and Heels        Does the Patient have Skin Breakdown?   No         Chuy Prevention initiated:  NA   Wound Care Orders initiated:  NA      [facility] nurse consulted for Pressure Injury (Stage 3,4, Unstageable, DTI, NWPT, and Complex wounds), New and Established Ostomies:  NA      Nurse 1 eSignature: Electronically signed by Lorie Vallejo RN on 2/25/2020 at 8:11 AM      **SHARE this note so that the co-signing nurse is able to place an eSignature**    Nurse 2 eSignature: .Paxton Borja

## 2020-02-25 NOTE — ED NOTES
[facility] transported to Room 4281 via stretcher by ED tech. Patient transported with bedside cardiac monitor and with IV medications infusing. IV site clean, dry, and intact. MEWS score and pain assessed and documented. Updated patient on plan of care.        Darya Lechuga RN  02/25/20 4915

## 2020-02-25 NOTE — ED NOTES
Dr. Hemant townsenday with not collecting cultures on patient.      Floresita Ruth, MASSIMO  02/25/20 1744

## 2020-02-25 NOTE — ED PROVIDER NOTES
Attending Note:    The patient was seen and examined by the mid-level provider. I also completed a face-to-face examination. HPI: Travis Ashraf is a 60 y.o. female who presents to the emergency department with a complaint of sudden onset of epigastric abdominal pain at 4:30 PM today described as a pressure that radiates to the back. She denies any lower abdominal pain. She denies any upper chest pain or shortness of breath. No diaphoresis. She reports that she began vomiting a short time later and has vomited multiple times since the onset, now mostly dry heaves. She denies any melena hematochezia or hematemesis. Last bowel movement was today and was normal.  She reports that she had a similar episode 2 days ago which was relieved with Pepto-Bismol after a couple of hours and then went away. She went to an urgent care this evening and was sent to the emergency department for further evaluation. She reports a history of hypertension hyperlipidemia but denies any personal or family history of coronary artery disease or thromboembolic disease. She does have a history of schizophrenia which is currently stable. No current auditory or visual hallucinations. No suicidal or homicidal ideations. She is compliant with her medications. No recent changes in medications. Her Depakote was discontinued approximately 1 to 2 months ago. She denies any history of gastritis or peptic ulcer disease. No prior abdominal surgeries. She takes ibuprofen as needed but none recently. She denies any alcohol usage. She denies any fever or chills. No cough or cold symptoms. She denies any dysuria hematuria frequency urgency. No vaginal bleeding or discharge. She denies any fall trauma or injury. She currently rates her pain a 10/10 intensity. Physical Exam:     Constitutional: Actively vomiting, having dry heaves. Head: No visible evidence of trauma. Normocephalic.   Eyes: Pupils equal and reactive. No photophobia. Conjunctiva normal.    HENT: Oral mucosa moist.  Airway patent. Neck:  Soft and supple. Nontender. Heart:  Regular rate and rhythm. No murmur. Lungs:  Clear to auscultation. No wheezes, rales, or ronchi. No conversational dyspnea or accessory muscle use. Abdomen:  Soft, non-distended. Obese. Acutely tender in the midepigastric area that reproduced her pain. No guarding rigidity or rebound. Musculoskeletal: Extremities non-tender with full range of motion. No calf tenderness erythema or edema. Radial and dorsalis pedis pulses were equal bilaterally. Neurological: Alert and oriented x 3. Speech clear. No acute focal motor or sensory deficits. Skin: Skin is warm and dry. No rash. Psychiatric: Normal mood and affect. Behavior is normal.     DIAGNOSTIC RESULTS     EKG: All EKG's are interpreted by the Emergency Department Physician who either signs or Co-signs this chart in the absence of a cardiologist.    Normal sinus rhythm. Rate 95. ND interval 170 ms. QRS duration 102 ms. QTc is 497 ms. R axis -20 5 degrees. QT is mildly prolonged. EKG is similar in appearance to a prior EKG from November 7, 2019. RADIOLOGY:   Non-plain film images such as CT, Ultrasound and MRI are read by the radiologist. Plain radiographic images are visualized and preliminarily interpreted by the emergency physician with the below findings:        Interpretation per the Radiologist below, if available at the time of this note:    CT CHEST [address]   Final Result   1. No evidence of thoracic aorta dissection or aneurysm formation. 2. No evidence of pulmonary embolus   3. Mild-to-moderate degree of atherosclerotic plaque formation involving the   abdominal aorta, with a focal area of approximately 50-70% diameter narrowing   vomiting the infrarenal abdominal aorta.    4. Cholelithiasis, with mild pericholecystic inflammation, consistent with   acute cholecystitis RECOMMENDATIONS:   Dedicated CTA of the abdominal aorta would be helpful to further evaluate the   degree of luminal stenosis         XR CHEST PORTABLE   Final Result   No acute cardiopulmonary process.                ED BEDSIDE ULTRASOUND:   Performed by ED Physician - none    LABS:  Labs Reviewed   COMPREHENSIVE METABOLIC PANEL W/ REFLEX TO MG FOR LOW K - Abnormal; Notable for the following components:       Result Value    Glucose 160 (*)     GFR Non- 45 (*)     GFR  55 (*)     All other components within normal limits    Narrative:     Performed at:  [facility]  [address] [place]   Phone [phone]   URINE RT REFLEX TO CULTURE - Abnormal; Notable for the following components:    Clarity, UA CLOUDY (*)     Ketones, Urine 15 (*)     Leukocyte Esterase, Urine TRACE (*)     All other components within normal limits    Narrative:     Performed at:  [place] [address], [facility] [place]   Phone [phone]   MICROSCOPIC URINALYSIS - Abnormal; Notable for the following components:    Bacteria, UA RARE (*)     WBC, UA 6 (*)     Epi Cells 19 (*)     All other components within normal limits    Narrative:     Performed at:  [facility]  [address], [facility] [place]   Phone [phone]   CULTURE, URINE   CBC WITH AUTO DIFFERENTIAL    Narrative:     Performed at:  [place] [address], [facility] [place]   Phone [phone]   BRAIN NATRIURETIC PEPTIDE    Narrative:     Performed at:  [facility]  [facility] [address], [facility] [place]   Phone [phone]   TROPONIN    Narrative:     Performed at:  [facility]  [address] [place] [address] [facility] [place]   Phone [phone]   LIPASE    Narrative:     added on history of QT prolongation. 12:43 AM: Spoke with Dr. Silvestre Gan, general surgeon on-call. He recommends admission to the medicine service given her hypertension, QT prolongation, psychiatric issues and other things that will require medical management. She will likely require medical clearance prior to surgery. A call was placed to the hospitalist on-call for admission. CRITICAL CARE TIME   Total Critical Care time was 0 minutes, excluding separately reportable procedures. There was a high probability of clinically significant/life threatening deterioration in the patient's condition which required my urgent intervention. CONSULTS:  IP CONSULT TO GENERAL SURGERY    PROCEDURES:  Unless otherwise noted below, none     Procedures        FINAL IMPRESSION      1. Cholelithiasis and acute cholecystitis without obstruction    2. QT prolongation          DISPOSITION/PLAN   DISPOSITION        PATIENT REFERRED TO:  No follow-up provider specified. DISCHARGE MEDICATIONS:  New Prescriptions    No medications on file     Controlled Substances Monitoring:     No flowsheet data found. (Please note that portions of this note were completed with a voice recognition program.  Efforts were made to edit the dictations but occasionally words are mis-transcribed. )    [id number] A[address] Ochoa DO (electronically signed)  Attending Emergency Physician      Nerissa Goodrich DO  02/25/20 4192

## 2020-02-25 NOTE — H&P
Hospital Medicine History & Physical      PCP: ОЛЬГА Grant CNP    Date of Admission: 2/24/2020    Chief Complaint: Epigastric pain      History Of Present Illness:  Patient is a 61-year-old female with past medical history of schizophrenia, COPD, hyperlipidemia, hypertension, bipolar disorder who presents to the hospital for epigastric pain. According to the patient her pain started right around 4 PM while sitting. Patient also mentions it was associated with nonbloody vomiting and nausea. Patient CT abdomen was performed which did show acute cholecystitis. Patient denies shortness of breath, fevers chills diarrhea constipation, dizziness. She has dysuria associated with urination. General surgery was consulted from ED which recommended for admission with possible intervention in the morning    Past Medical History:          Diagnosis Date    Acid reflux     B[facility] disorder ([facility] [address]     Cervical polyp     COPD (chronic obstructive pulmonary disease) (HCC)     Hyperlipidemia     Hypertension     Schizophrenia ([facility] [address]        Past Surgical History:          Procedure Laterality Date    BREAST BIOPSY Right 12/13/2018    MAMMOGRAM GUIDED NEEDLE LOCALIZED RIGHT BREAST EXCISIONAL BIOPSY performed by Melissa Lemon MD at [place] [address] Right 12/13/2018       Medications Prior to Admission:      Prior to Admission medications    Medication Sig Start Date End Date Taking?  Authorizing Provider   pantoprazole (PROTONIX) 40 MG tablet TAKE ONE TABLET BY MOUTH DAILY 10/25/19  Yes ОЛЬГА Grant CNP   albuterol sulfate HFA (VENTOLIN HFA) 108 (90 Base) MCG/ACT inhaler Inhale 2 puffs into the lungs every 6 hours as needed for Wheezing 10/25/19  Yes ОЛЬГА Grant CNP   pravastatin (PRAVACHOL) 10 MG tablet Take 1 tablet by mouth daily 10/25/19  Yes ОЛЬГА Grant CNP   hydrochlorothiazide (HYDRODIURIL) 25 MG tablet Take 1 tablet by mouth daily 10/25/19  Yes ОЛЬГА Martinez - CNP   lisinopril (PRINIVIL;ZESTRIL) 10 MG tablet Take 1 tablet by mouth daily 10/25/19  Yes ОЛЬГА Martinez - JULIETH   umeclidinium-vilanterol [facility] ELLIPTA) 62.5-25 MCG/INH AEPB inhaler Inhale 1 puff into the lungs daily 10/25/19  Yes ОЛЬГА Martinez - CNP   sertraline (ZOLOFT) 50 MG tablet  4/3/19  Yes Historical Provider, MD   benztropine (COGENTIN) 1 MG tablet  19  Yes Historical Provider, MD   tamoxifen (NOLVADEX) 20 MG tablet  19  Yes Historical Provider, MD   OLANZapine (ZYPREXA) 15 MG tablet Take 15 mg by mouth nightly   Yes Historical Provider, MD   zolpidem (AMBIEN) 5 MG tablet Take 5 mg by mouth nightly as needed for Sleep. Tammy Page Historical Provider, MD       Allergies:  Haldol [haloperidol lactate]; Morphine; and Pcn [penicillins]    Social History:      TOBACCO:   reports that she has been smoking cigarettes. She started smoking about 43 years ago. She has a 20.00 pack-year smoking history. She has never used smokeless tobacco.  ETOH:   reports no history of alcohol use. Family History:       M - HTN          Problem Relation Age of Onset    Hypertension Mother     Heart Failure Mother     Mult Sclerosis Father     No Known Problems Maternal Grandmother     No Known Problems Maternal Grandfather     No Known Problems Paternal Grandmother     No Known Problems Paternal Grandfather     Lung Cancer Sister     Heart Failure Sister     Other Sister          SHORTLY AFTER BIRTH, WAS A TWIN       REVIEW OF SYSTEMS:   Pertinent positives as noted in the HPI. All other systems reviewed and negative. PHYSICAL EXAM PERFORMED:    BP (!) 148/68   Pulse 89   Temp 97.6 °F (36.4 °C) (Oral)   Resp 20   Ht 5' 3\" (1.6 m)   Wt 270 lb 8.1 oz (122.7 kg)   SpO2 96%   BMI 47.92 kg/m²     General appearance:  No apparent distress, cooperative.   HEENT:  Normal cephalic, atraumatic without obvious deformity. Conjunctivae/corneas clear. Neck: Supple, with full range of motion. No cervical lymphadenopathy  Respiratory:  Normal respiratory effort. Clear to auscultation, bilaterally without Rales/Wheezes/Rhonchi. Cardiovascular:  Regular rate and rhythm with normal S1/S2 without murmurs, rubs or gallops. Abdomen: Right upper quadrant tenderness to palpation, epigastric tenderness to palpation noticed, soft, bowel sounds present  Musculoskeletal:  No edema bilaterally. No tenderness on palpation   Skin: no rash visible  Neurologic:  Neurologically intact without any focal sensory/motor deficits. grossly non-focal.  Psychiatric:  Alert and oriented, normal mood  Peripheral Pulses: +2 palpable, equal bilaterally       Labs:     Recent Labs     02/24/20 2103   WBC 10.5   HGB 13.1   HCT 39.3        Recent Labs     02/24/20 2103      K 4.2      CO2 27   BUN 15   CREATININE 1.2   CALCIUM 9.9     Recent Labs     02/24/20 2103   AST 18   ALT 22   BILITOT <0.2   ALKPHOS 86     No results for input(s): INR in the last 72 hours. Recent Labs     02/24/20 2103   TROPONINI <0.01       Urinalysis:      Lab Results   Component Value Date    NITRU Negative 02/24/2020    WBCUA 6 02/24/2020    BACTERIA RARE 02/24/2020    RBCUA 2 02/24/2020    BLOODU Negative 02/24/2020    SPECGRAV 1.015 02/24/2020    GLUCOSEU Negative 02/24/2020       Radiology:       CT CHEST ABDOMEN PELVIS W CONTRAST   Final Result   1. No evidence of thoracic aorta dissection or aneurysm formation. 2. No evidence of pulmonary embolus   3. Mild-to-moderate degree of atherosclerotic plaque formation involving the   abdominal aorta, with a focal area of approximately 50-70% diameter narrowing   vomiting the infrarenal abdominal aorta.    4. Cholelithiasis, with mild pericholecystic inflammation, consistent with   acute cholecystitis      RECOMMENDATIONS:   Dedicated CTA of the abdominal aorta would be helpful to further evaluate the   degree of luminal stenosis         XR CHEST PORTABLE   Final Result   No acute cardiopulmonary process. Active Hospital Problems    Diagnosis Date Noted    Chest pain [R07.9] 02/25/2020         Patient is a 43-year-old female with past medical history of schizophrenia, COPD, hyperlipidemia, hypertension, bipolar disorder who presents to the hospital for epigastric pain. According to the patient her pain started right around 4 PM while sitting. Patient also mentions it was associated with nonbloody vomiting and nausea. Patient CT abdomen was performed which did show acute cholecystitis. Patient denies shortness of breath, fevers chills diarrhea constipation, dizziness. She has dysuria associated with urination. General surgery was consulted from ED which recommended for admission with possible intervention in the morning    Assessment  Epigastric pain likely secondary to acute cholecystitis  Prolonged QT syndrome  UA suggestive of UTI  Schizophrenia  Hyperlipidemia  Hypertension  COPD    Plan  N.p.o., IV fluids, pain control, general surgery consulted  Given prolonged QT, cardiology consulted for preoperative evaluation  Psychiatric consulted for psychiatric medication adjustment  Continue cardiac telemetry  Resume home medications  DuoNeb PRN  DVT Prophylaxis: Lovenox  Diet: Diet NPO Effective Now  Code Status: Full Code  PT/OT Eval Status: Ordered  Dispo -pending clinical course, likely 48 to 72 hours, home       Sumaya Moon MD    Thank you ОЛЬГА Man - JULIETH for the opportunity to be involved in this patient's care. If you have any questions or concerns please feel free to contact me at [phone].

## 2020-02-26 VITALS
HEIGHT: 63 IN | OXYGEN SATURATION: 94 % | TEMPERATURE: 98.1 F | SYSTOLIC BLOOD PRESSURE: 145 MMHG | HEART RATE: 106 BPM | WEIGHT: 273.15 LBS | DIASTOLIC BLOOD PRESSURE: 65 MMHG | BODY MASS INDEX: 48.4 KG/M2 | RESPIRATION RATE: 16 BRPM

## 2020-02-26 LAB
ALBUMIN SERPL-MCNC: 3.9 G/DL (ref 3.4–5)
ALBUMIN SERPL-MCNC: 4 G/DL (ref 3.4–5)
ALP BLD-CCNC: 74 U/L (ref 40–129)
ALT SERPL-CCNC: 39 U/L (ref 10–40)
ANION GAP SERPL CALCULATED.3IONS-SCNC: 11 MMOL/L (ref 3–16)
ANION GAP SERPL CALCULATED.3IONS-SCNC: 14 MMOL/L (ref 3–16)
AST SERPL-CCNC: 35 U/L (ref 15–37)
BILIRUB SERPL-MCNC: 0.4 MG/DL (ref 0–1)
BILIRUBIN DIRECT: <0.2 MG/DL (ref 0–0.3)
BILIRUBIN, INDIRECT: NORMAL MG/DL (ref 0–1)
BUN BLDV-MCNC: 12 MG/DL (ref 7–20)
BUN BLDV-MCNC: 12 MG/DL (ref 7–20)
CALCIUM SERPL-MCNC: 9.8 MG/DL (ref 8.3–10.6)
CALCIUM SERPL-MCNC: 9.8 MG/DL (ref 8.3–10.6)
CHLORIDE BLD-SCNC: 103 MMOL/L (ref 99–110)
CHLORIDE BLD-SCNC: 104 MMOL/L (ref 99–110)
CO2: 23 MMOL/L (ref 21–32)
CO2: 25 MMOL/L (ref 21–32)
CREAT SERPL-MCNC: 0.9 MG/DL (ref 0.6–1.2)
CREAT SERPL-MCNC: 1 MG/DL (ref 0.6–1.2)
GFR AFRICAN AMERICAN: >60
GFR AFRICAN AMERICAN: >60
GFR NON-AFRICAN AMERICAN: 56
GFR NON-AFRICAN AMERICAN: >60
GLUCOSE BLD-MCNC: 193 MG/DL (ref 70–99)
GLUCOSE BLD-MCNC: 195 MG/DL (ref 70–99)
HCT VFR BLD CALC: 39.1 % (ref 36–48)
HEMOGLOBIN: 12.8 G/DL (ref 12–16)
MCH RBC QN AUTO: 28.7 PG (ref 26–34)
MCHC RBC AUTO-ENTMCNC: 32.8 G/DL (ref 31–36)
MCV RBC AUTO: 87.6 FL (ref 80–100)
PDW BLD-RTO: 13.9 % (ref 12.4–15.4)
PHOSPHORUS: 3.2 MG/DL (ref 2.5–4.9)
PLATELET # BLD: 195 K/UL (ref 135–450)
PMV BLD AUTO: 8.5 FL (ref 5–10.5)
POTASSIUM REFLEX MAGNESIUM: 3.9 MMOL/L (ref 3.5–5.1)
POTASSIUM SERPL-SCNC: 3.9 MMOL/L (ref 3.5–5.1)
RBC # BLD: 4.46 M/UL (ref 4–5.2)
SODIUM BLD-SCNC: 139 MMOL/L (ref 136–145)
SODIUM BLD-SCNC: 141 MMOL/L (ref 136–145)
TOTAL PROTEIN: 7.3 G/DL (ref 6.4–8.2)
WBC # BLD: 11.6 K/UL (ref 4–11)

## 2020-02-26 PROCEDURE — APPNB30 APP NON BILLABLE TIME 0-30 MINS: Performed by: PHYSICIAN ASSISTANT

## 2020-02-26 PROCEDURE — APPSS15 APP SPLIT SHARED TIME 0-15 MINUTES: Performed by: PHYSICIAN ASSISTANT

## 2020-02-26 PROCEDURE — G0378 HOSPITAL OBSERVATION PER HR: HCPCS

## 2020-02-26 PROCEDURE — 2580000003 HC RX 258: Performed by: SURGERY

## 2020-02-26 PROCEDURE — 6370000000 HC RX 637 (ALT 250 FOR IP): Performed by: SURGERY

## 2020-02-26 PROCEDURE — 96372 THER/PROPH/DIAG INJ SC/IM: CPT

## 2020-02-26 PROCEDURE — 85027 COMPLETE CBC AUTOMATED: CPT

## 2020-02-26 PROCEDURE — 94760 N-INVAS EAR/PLS OXIMETRY 1: CPT

## 2020-02-26 PROCEDURE — 36415 COLL VENOUS BLD VENIPUNCTURE: CPT

## 2020-02-26 PROCEDURE — 80069 RENAL FUNCTION PANEL: CPT

## 2020-02-26 PROCEDURE — 99024 POSTOP FOLLOW-UP VISIT: CPT | Performed by: PHYSICIAN ASSISTANT

## 2020-02-26 PROCEDURE — 6360000002 HC RX W HCPCS: Performed by: SURGERY

## 2020-02-26 PROCEDURE — 94640 AIRWAY INHALATION TREATMENT: CPT

## 2020-02-26 PROCEDURE — 80076 HEPATIC FUNCTION PANEL: CPT

## 2020-02-26 RX ORDER — OXYCODONE HYDROCHLORIDE 5 MG/1
5 TABLET ORAL EVERY 6 HOURS PRN
Qty: 15 TABLET | Refills: 0 | Status: SHIPPED | OUTPATIENT
Start: 2020-02-26 | End: 2020-03-04

## 2020-02-26 RX ORDER — METFORMIN HYDROCHLORIDE 500 MG/1
500 TABLET, EXTENDED RELEASE ORAL
Qty: 30 TABLET | Refills: 2 | Status: SHIPPED | OUTPATIENT
Start: 2020-02-26 | End: 2020-07-09 | Stop reason: SDUPTHER

## 2020-02-26 RX ADMIN — TAMOXIFEN CITRATE 20 MG: 10 TABLET ORAL at 08:01

## 2020-02-26 RX ADMIN — BENZTROPINE MESYLATE 1 MG: 1 TABLET ORAL at 08:01

## 2020-02-26 RX ADMIN — HYDROCHLOROTHIAZIDE 25 MG: 25 TABLET ORAL at 08:00

## 2020-02-26 RX ADMIN — OXYCODONE HYDROCHLORIDE 10 MG: 10 TABLET ORAL at 05:26

## 2020-02-26 RX ADMIN — OXYCODONE HYDROCHLORIDE 10 MG: 10 TABLET ORAL at 13:48

## 2020-02-26 RX ADMIN — LISINOPRIL 10 MG: 10 TABLET ORAL at 08:00

## 2020-02-26 RX ADMIN — SERTRALINE 100 MG: 100 TABLET, FILM COATED ORAL at 08:00

## 2020-02-26 RX ADMIN — PANTOPRAZOLE SODIUM 40 MG: 40 TABLET, DELAYED RELEASE ORAL at 08:03

## 2020-02-26 RX ADMIN — ENOXAPARIN SODIUM 40 MG: 40 INJECTION SUBCUTANEOUS at 08:01

## 2020-02-26 RX ADMIN — Medication 10 ML: at 08:02

## 2020-02-26 RX ADMIN — GLYCOPYRROLATE AND FORMOTEROL FUMARATE 2 PUFF: 9; 4.8 AEROSOL, METERED RESPIRATORY (INHALATION) at 09:41

## 2020-02-26 ASSESSMENT — PAIN - FUNCTIONAL ASSESSMENT
PAIN_FUNCTIONAL_ASSESSMENT: ACTIVITIES ARE NOT PREVENTED
PAIN_FUNCTIONAL_ASSESSMENT: ACTIVITIES ARE NOT PREVENTED

## 2020-02-26 ASSESSMENT — PAIN DESCRIPTION - DESCRIPTORS
DESCRIPTORS: SHARP
DESCRIPTORS: ACHING;SHARP

## 2020-02-26 ASSESSMENT — PAIN DESCRIPTION - ORIENTATION
ORIENTATION: MID
ORIENTATION: MID

## 2020-02-26 ASSESSMENT — PAIN DESCRIPTION - PROGRESSION
CLINICAL_PROGRESSION: GRADUALLY IMPROVING
CLINICAL_PROGRESSION: GRADUALLY WORSENING

## 2020-02-26 ASSESSMENT — PAIN DESCRIPTION - FREQUENCY
FREQUENCY: INTERMITTENT
FREQUENCY: INTERMITTENT

## 2020-02-26 ASSESSMENT — PAIN SCALES - GENERAL
PAINLEVEL_OUTOF10: 8
PAINLEVEL_OUTOF10: 8
PAINLEVEL_OUTOF10: 3
PAINLEVEL_OUTOF10: 7

## 2020-02-26 ASSESSMENT — PAIN DESCRIPTION - PAIN TYPE
TYPE: ACUTE PAIN
TYPE: ACUTE PAIN;SURGICAL PAIN

## 2020-02-26 ASSESSMENT — PAIN DESCRIPTION - ONSET
ONSET: GRADUAL
ONSET: GRADUAL

## 2020-02-26 ASSESSMENT — PAIN DESCRIPTION - LOCATION
LOCATION: ABDOMEN
LOCATION: ABDOMEN

## 2020-02-26 NOTE — PROGRESS NOTES
Pt resting quietly in bed with eyes closed, resp easy and even. . Call light within reach. Will continue to monitor.

## 2020-02-26 NOTE — PROGRESS NOTES
AST  --   --  35   ALT  --   --  39   BILITOT  --   --  0.4   BILIDIR  --   --  <0.2   NITRU Negative  --   --    COLORU YELLOW  --   --    BACTERIA  --  RARE*  --      CBC:   Lab Results   Component Value Date    WBC 11.6 02/26/2020    RBC 4.46 02/26/2020    HGB 12.8 02/26/2020    HCT 39.1 02/26/2020    MCV 87.6 02/26/2020    MCH 28.7 02/26/2020    MCHC 32.8 02/26/2020    RDW 13.9 02/26/2020     02/26/2020    MPV 8.5 02/26/2020     CMP:    Lab Results   Component Value Date     02/26/2020     02/26/2020    K 3.9 02/26/2020    K 3.9 02/26/2020     02/26/2020     02/26/2020    CO2 25 02/26/2020    CO2 23 02/26/2020    BUN 12 02/26/2020    BUN 12 02/26/2020    CREATININE 0.9 02/26/2020    CREATININE 1.0 02/26/2020    GFRAA >60 02/26/2020    GFRAA >60 02/26/2020    AGRATIO 1.1 02/24/2020    LABGLOM >60 02/26/2020    LABGLOM 56 02/26/2020    GLUCOSE 193 02/26/2020    GLUCOSE 195 02/26/2020    PROT 7.3 02/26/2020    LABALBU 3.9 02/26/2020    LABALBU 4.0 02/26/2020    CALCIUM 9.8 02/26/2020    CALCIUM 9.8 02/26/2020    BILITOT 0.4 02/26/2020    ALKPHOS 74 02/26/2020    AST 35 02/26/2020    ALT 39 02/26/2020         Uli Mosquera PA-C  Electronically signed 2/26/2020 at 12:56 PM

## 2020-02-26 NOTE — PLAN OF CARE
Problem: Pain:  Goal: Pain level will decrease  Description  Pain level will decrease  2/26/2020 0939 by Craig Samaniego RN  Outcome: Ongoing  Note:   Pain/discomfort being managed with PRN analgesics per MD order. Pt able to express presence and absence of pain and rate pain appropriately using numerical scale       Problem: SAFETY  Goal: Free from accidental physical injury  2/26/2020 0939 by Craig Samaniego RN  Outcome: Ongoing  Note:   Pt A&O aware of her abilities. Pt understands and knows to call when in need of ambulation. Measures were made to prevent accidental needle stick, friction, shear, etc. Environment kept free of clutter and adequate lighting provided. Will continue to monitor for physical injury. Problem: DAILY CARE  Goal: Daily care needs are met  2/26/2020 6933 by Craig Samaniego RN  Outcome: Ongoing  Note:   In collaboration with the healthcare team, the patient's daily care needs are meet. Patient is encouraged to perform tasks independently per ability. Problem: KNOWLEDGE DEFICIT  Goal: Patient/S.O. demonstrates understanding of disease process, treatment plan, medications, and discharge instructions. 2/26/2020 0939 by Craig Samaniego RN  Outcome: Ongoing  Note:   Patient needs continued education of disease process, treatment plan, medications and discharge instructions. Teachings will be provided at a level the patient/family understands. Problem: DISCHARGE BARRIERS  Goal: Patient's continuum of care needs are met  2/26/2020 0939 by Craig Samaniego RN  Outcome: Ongoing  Note:   Continuing to work with patient and health care team on discharge plan. Discharge instructions and medication management will be reviewed prior to discharge. Problem: Falls - Risk of:  Goal: Will remain free from falls  Description  Will remain free from falls  2/26/2020 0939 by Craig Samaniego RN  Outcome: Ongoing  Note:   Pt free from falls this shift. Fall precautions in place at all times.  Call light within reach. Pt able to and agreeable to contact for safety appropriately.

## 2020-02-26 NOTE — DISCHARGE SUMMARY
Resp 18   Ht 5' 3\" (1.6 m)   Wt 273 lb 2.4 oz (123.9 kg)   SpO2 95%   BMI 48.39 kg/m²       General appearance: Morbidly obese, No apparent distress, appears stated age and cooperative. HEENT:  Normal cephalic, atraumatic without obvious deformity. Pupils equal, round, and reactive to light. Extra ocular muscles intact. Conjunctivae/corneas clear. Neck: Supple, with full range of motion. No jugular venous distention. Trachea midline. Respiratory:  Normal respiratory effort. Clear to auscultation, bilaterally without Rales/Wheezes/Rhonchi. Cardiovascular:  Regular rate and rhythm with normal S1/S2 without murmurs, rubs or gallops. Abdomen: Soft, non-tender, non-distended with normal bowel sounds. Post lap yas incisions CDI. Musculoskeletal:  No clubbing, cyanosis or edema bilaterally. Full range of motion without deformity. Skin: Skin color, texture, turgor normal.  No rashes or lesions. Neurologic:  Neurovascularly intact without any focal sensory/motor deficits. Cranial nerves: II-XII intact, grossly non-focal.  Psychiatric:  Alert and oriented, thought content appropriate, normal insight  Capillary Refill: Brisk,< 3 seconds   Peripheral Pulses: +2 palpable, equal bilaterally       Labs:  For convenience and continuity at follow-up the following most recent labs are provided:      CBC:    Lab Results   Component Value Date    WBC 11.6 02/26/2020    HGB 12.8 02/26/2020    HCT 39.1 02/26/2020     02/26/2020       Renal:    Lab Results   Component Value Date     02/26/2020     02/26/2020    K 3.9 02/26/2020    K 3.9 02/26/2020     02/26/2020     02/26/2020    CO2 25 02/26/2020    CO2 23 02/26/2020    BUN 12 02/26/2020    BUN 12 02/26/2020    CREATININE 0.9 02/26/2020    CREATININE 1.0 02/26/2020    CALCIUM 9.8 02/26/2020    CALCIUM 9.8 02/26/2020    PHOS 3.2 02/26/2020         Significant Diagnostic Studies    Radiology:   FL CHOLANGIOGRAM OR   Preliminary Result   No evidence of choledocholithiasis. CT CHEST ABDOMEN PELVIS W CONTRAST   Final Result   1. No evidence of thoracic aorta dissection or aneurysm formation. 2. No evidence of pulmonary embolus   3. Mild-to-moderate degree of atherosclerotic plaque formation involving the   abdominal aorta, with a focal area of approximately 50-70% diameter narrowing   vomiting the infrarenal abdominal aorta. 4. Cholelithiasis, with mild pericholecystic inflammation, consistent with   acute cholecystitis      RECOMMENDATIONS:   Dedicated CTA of the abdominal aorta would be helpful to further evaluate the   degree of luminal stenosis         XR CHEST PORTABLE   Final Result   No acute cardiopulmonary process. Consults:     IP CONSULT TO GENERAL SURGERY    Disposition:  home     Condition at Discharge: Stable    Discharge Instructions/Follow-up:  PCP 1 week. Code Status:  Full Code     Activity: activity as tolerated    Diet: diabetic diet      Discharge Medications:     Current Discharge Medication List           Details   oxyCODONE (ROXICODONE) 5 MG immediate release tablet Take 1 tablet by mouth every 6 hours as needed for Pain for up to 7 days.   Qty: 15 tablet, Refills: 0    Comments: Reduce doses taken as pain becomes manageable  Associated Diagnoses: Cholelithiasis and acute cholecystitis without obstruction              Details   pantoprazole (PROTONIX) 40 MG tablet TAKE ONE TABLET BY MOUTH DAILY  Qty: 30 tablet, Refills: 5      albuterol sulfate HFA (VENTOLIN HFA) 108 (90 Base) MCG/ACT inhaler Inhale 2 puffs into the lungs every 6 hours as needed for Wheezing  Qty: 1 Inhaler, Refills: 5      pravastatin (PRAVACHOL) 10 MG tablet Take 1 tablet by mouth daily  Qty: 30 tablet, Refills: 5      hydrochlorothiazide (HYDRODIURIL) 25 MG tablet Take 1 tablet by mouth daily  Qty: 30 tablet, Refills: 5      lisinopril (PRINIVIL;ZESTRIL) 10 MG tablet Take 1 tablet by mouth daily  Qty: 30 tablet, Refills: 5

## 2020-02-26 NOTE — CARE COORDINATION
INITIAL CASE MANAGEMENT ASSESSMENT    Reviewed chart, met with patient to assess possible discharge needs. Explained Case Management role/services. Living Situation: Patient lives alone in an apt with 3 flights of [address]. Confirmed home address. ADLs: Independent     DME: None used    PT/OT Recs: PT rec home with assist PRN and signed off. OT signed off     Active Services: None     Transportation: Active . Reports she will have transportation at discharge. Medications: No barriers to taking/obtaining medications. Pharmacy is [facility] in [place]. PCP: Was Kelli Lee but she left the practice. New PCP appt already scheduled for March 19th with Dr. Anabell Awan      HD/PD: N/A    PLAN/COMMENTS: No needs identified at this time. SW/CM provided contact information for patient or family to call with any questions. SW/CM will follow and assist as needed.       Electronically signed by RAFAELA Greco on 2/26/2020 at 10:38 AM

## 2020-02-27 LAB
ORGANISM: ABNORMAL
URINE CULTURE, ROUTINE: ABNORMAL
URINE CULTURE, ROUTINE: ABNORMAL

## 2020-03-11 ENCOUNTER — OFFICE VISIT (OUTPATIENT)
Dept: SURGERY | Age: 65
End: 2020-03-11

## 2020-03-11 VITALS
HEART RATE: 98 BPM | SYSTOLIC BLOOD PRESSURE: 131 MMHG | DIASTOLIC BLOOD PRESSURE: 75 MMHG | WEIGHT: 268 LBS | BODY MASS INDEX: 47.47 KG/M2

## 2020-03-11 PROCEDURE — 99024 POSTOP FOLLOW-UP VISIT: CPT | Performed by: SURGERY

## 2020-04-13 RX ORDER — UMECLIDINIUM BROMIDE AND VILANTEROL TRIFENATATE 62.5; 25 UG/1; UG/1
1 POWDER RESPIRATORY (INHALATION) DAILY
Qty: 1 EACH | Refills: 5 | Status: SHIPPED | OUTPATIENT
Start: 2020-04-13 | End: 2021-02-10

## 2020-04-13 NOTE — TELEPHONE ENCOUNTER
Colleen faxed refill request [phone]    Last ov 11/7/19 with Jemima Huber  Next ov 5/7/20- NTP appt with Juliana Sidhu

## 2020-04-15 ENCOUNTER — TELEPHONE (OUTPATIENT)
Dept: PRIMARY CARE CLINIC | Age: 65
End: 2020-04-15

## 2020-04-15 NOTE — TELEPHONE ENCOUNTER
PA SUBMITTED VIA CMM FOR Anoro Ellipta 62.5-25MCG/INH aerosol powder  Key: [id number]   STATUS: PENDING

## 2020-04-16 NOTE — TELEPHONE ENCOUNTER
Received APPROVAL for Anoro Ellipta 62.5-25MCG/INH aerosol powder from 04/15/2020 through 04/15/2021. Approval letter attached. Please advise patient. Thank you.

## 2020-06-08 ENCOUNTER — TELEPHONE (OUTPATIENT)
Dept: ORTHOPEDIC SURGERY | Age: 65
End: 2020-06-08

## 2020-06-08 ENCOUNTER — OFFICE VISIT (OUTPATIENT)
Dept: ORTHOPEDIC SURGERY | Age: 65
End: 2020-06-08
Payer: MEDICAID

## 2020-06-08 VITALS — WEIGHT: 266.6 LBS | TEMPERATURE: 97.2 F | HEIGHT: 63 IN | BODY MASS INDEX: 47.24 KG/M2 | RESPIRATION RATE: 16 BRPM

## 2020-06-08 PROCEDURE — 99213 OFFICE O/P EST LOW 20 MIN: CPT | Performed by: PHYSICIAN ASSISTANT

## 2020-06-08 PROCEDURE — 3017F COLORECTAL CA SCREEN DOC REV: CPT | Performed by: PHYSICIAN ASSISTANT

## 2020-06-08 PROCEDURE — G8417 CALC BMI ABV UP PARAM F/U: HCPCS | Performed by: PHYSICIAN ASSISTANT

## 2020-06-08 PROCEDURE — G9899 SCRN MAM PERF RSLTS DOC: HCPCS | Performed by: PHYSICIAN ASSISTANT

## 2020-06-08 PROCEDURE — 4004F PT TOBACCO SCREEN RCVD TLK: CPT | Performed by: PHYSICIAN ASSISTANT

## 2020-06-08 PROCEDURE — G8427 DOCREV CUR MEDS BY ELIG CLIN: HCPCS | Performed by: PHYSICIAN ASSISTANT

## 2020-06-08 NOTE — TELEPHONE ENCOUNTER
PATIENT CALLING TO SCHEDULE LEFT HIP JOINT INJECTION WITH DR Mela White UNDER FLUOROSCOPY    [phone]

## 2020-06-08 NOTE — PROGRESS NOTES
This dictation was done with [employer] dictation and may contain mechanical errors related to translation. The review of systems was currently provided by the patient and reviewed with the medical assistant at today's visit. Please see media. Subjective:  Rebekah Murcia is a 60 y.o. who is here complaining of pain in her left leg both knees. She had previously for her knee and she has had some improvement with injections. She continues to smoke up to a pack a day and I counseled her on smoking cessation. Her BMI is 47 she takes metformin for diabetes and is also on Zoloft for her bipolar disorder and recently had a prescription of oxycodone for the pain. Today because of the chronicity of her symptoms I did order an AP pelvis to look at the integrity of the hip.   This x-ray did show that some of the pain in her left leg that she feels is coming from her knee may be coming from her hip      Patient Active Problem List   Diagnosis    Bipolar disorder, manic ([address]    Psychosis ([facility] [address]    Breast mass, right    Chest pain    Cholelithiasis and acute cholecystitis without obstruction           Current Outpatient Medications on File Prior to Visit   Medication Sig Dispense Refill    umeclidinium-vilanterol (ANORO ELLIPTA) 62.5-25 MCG/INH AEPB inhaler Inhale 1 puff into the lungs daily 1 each 5    metFORMIN (GLUCOPHAGE-XR) 500 MG extended release tablet Take 1 tablet by mouth daily (with breakfast) 30 tablet 2    pantoprazole (PROTONIX) 40 MG tablet TAKE ONE TABLET BY MOUTH DAILY 30 tablet 5    albuterol sulfate HFA (VENTOLIN HFA) 108 (90 Base) MCG/ACT inhaler Inhale 2 puffs into the lungs every 6 hours as needed for Wheezing 1 Inhaler 5    pravastatin (PRAVACHOL) 10 MG tablet Take 1 tablet by mouth daily 30 tablet 5    hydrochlorothiazide (HYDRODIURIL) 25 MG tablet Take 1 tablet by mouth daily 30 tablet 5    lisinopril (PRINIVIL;ZESTRIL) 10 MG tablet Take 1 tablet by mouth daily 30 tablet 5   

## 2020-06-09 ENCOUNTER — TELEPHONE (OUTPATIENT)
Dept: ORTHOPEDIC SURGERY | Age: 65
End: 2020-06-09

## 2020-06-09 NOTE — TELEPHONE ENCOUNTER
REQUESTING MEDS FOR PAIN / LEVEL 10 / IT IS HARD FOR HER TO WALK.  -----------------------------------------------------  WHEN CAN SHE COME IN FOR HER   INJECTION? PLEASE CALL BACK TO DISCUSS.     Shanti Reid # [phone]

## 2020-06-10 ENCOUNTER — TELEPHONE (OUTPATIENT)
Dept: ORTHOPEDIC SURGERY | Age: 65
End: 2020-06-10

## 2020-06-30 ENCOUNTER — OFFICE VISIT (OUTPATIENT)
Dept: ORTHOPEDIC SURGERY | Age: 65
End: 2020-06-30
Payer: MEDICAID

## 2020-06-30 VITALS — TEMPERATURE: 97.3 F | HEIGHT: 63 IN | WEIGHT: 266 LBS | BODY MASS INDEX: 47.13 KG/M2

## 2020-06-30 PROCEDURE — 20611 DRAIN/INJ JOINT/BURSA W/US: CPT | Performed by: ORTHOPAEDIC SURGERY

## 2020-06-30 RX ORDER — LIDOCAINE HYDROCHLORIDE 10 MG/ML
5 INJECTION, SOLUTION INFILTRATION; PERINEURAL ONCE
Status: COMPLETED | OUTPATIENT
Start: 2020-06-30 | End: 2020-06-30

## 2020-06-30 RX ORDER — METHYLPREDNISOLONE ACETATE 40 MG/ML
80 INJECTION, SUSPENSION INTRA-ARTICULAR; INTRALESIONAL; INTRAMUSCULAR; SOFT TISSUE ONCE
Status: COMPLETED | OUTPATIENT
Start: 2020-06-30 | End: 2020-06-30

## 2020-06-30 RX ADMIN — LIDOCAINE HYDROCHLORIDE 5 ML: 10 INJECTION, SOLUTION INFILTRATION; PERINEURAL at 15:46

## 2020-06-30 RX ADMIN — METHYLPREDNISOLONE ACETATE 80 MG: 40 INJECTION, SUSPENSION INTRA-ARTICULAR; INTRALESIONAL; INTRAMUSCULAR; SOFT TISSUE at 15:46

## 2020-06-30 NOTE — PROGRESS NOTES
ULTRASOUND GUIDED HIP INJECTION    Brown Cruz    June 30, 2020    Chief Complaint   Patient presents with    Hip Pain     LT Hip UltraSond Injection, Referred by Carola Tucker       Temp 97.3 °F (36.3 °C)   Ht 5' 3\" (1.6 m)   Wt 266 lb (120.7 kg)   BMI 47.12 kg/m²     Juan Alberto Reynaga is a 27-year-old woman sent by Mr. Carola Tucker for an ultrasound directed intra-articular steroid injection of her left hip. She has a history of primarily bilateral knee pain with a left worse than right. She is failed to have improvement with left knee steroid injections. For this reason Mr. Yecenia Combs did obtain left hip x-rays on 6/8/2020 which did show a significant degree of degenerative changes of her left hip joint. For this reason he felt the left knee pain may be of hip joint origin and referred her to our office for this injection. Juan Alberto Reynaga is aware that the injection is in part of diagnostic significance as well as treatment. Review of systems reviewed from patient history dated on  8/15/19  and available in the patient's chart under media tab. Physical Exam:   Examination of the lefthip shows a positive logroll. No Lesion of the skin is noted over the injection site    Impression:  left hip osteoarthritis. Plan:  1. Injection with cortisone was recommended into  left   hip joint using ultrasound visualization. She understands the risks and benefits of the injection and describes no potential allergies. Time out was performed to verify correct person, correct procedure, and correct site. 2. Ultrasound visualization was first performed utilizing the [facility] Ultrasound unit using an 5/2 MHz Curved Probe. finding the femoral neck head junction. Next the femoral artery and vein were visualized with ultrasound medial to the femoral head. The location of the needle insertion was determined well lateral to the neurovascular structures and the site was anesthetized with 3 mL of 1% Lidocaine.  The site was then

## 2020-06-30 NOTE — PATIENT INSTRUCTIONS
Impression:  left hip osteoarthritis. Plan:  1. Injection with cortisone was recommended into  left   hip joint using ultrasound visualization. She understands the risks and benefits of the injection and describes no potential allergies. Time out was performed to verify correct person, correct procedure, and correct site. 2. Ultrasound visualization was first performed utilizing the [facility] Ultrasound unit using an 5/2 MHz Curved Probe. finding the femoral neck head junction. Next the femoral artery and vein were visualized with ultrasound medial to the femoral head. The location of the needle insertion was determined well lateral to the neurovascular structures and the site was anesthetized with 3 mL of 1% Lidocaine. The site was then prepped with ChloraPrep. A sterile cover was placed over the transducer head and the femoral head neck junction once again visualized. A 20-gauge spinal needle was then introduced under ultrasound control to the head neck junction. Once the needle was intracapsular the hip joint was injected with 2 mL  of 1% Lidocaine mixed with 2 ml of 40 mg Depo Medrol. Jing Fernando tolerated the procedure well and  did report 80% relief of  hip pain within 5 minutes of the injection. 3.  She is return to Mr. Hess's care.     Paola Dillard MD  6/30/2020

## 2020-07-09 ENCOUNTER — TELEPHONE (OUTPATIENT)
Dept: FAMILY MEDICINE CLINIC | Age: 65
End: 2020-07-09

## 2020-07-09 ENCOUNTER — OFFICE VISIT (OUTPATIENT)
Dept: FAMILY MEDICINE CLINIC | Age: 65
End: 2020-07-09
Payer: MEDICAID

## 2020-07-09 VITALS
OXYGEN SATURATION: 96 % | SYSTOLIC BLOOD PRESSURE: 124 MMHG | HEART RATE: 101 BPM | TEMPERATURE: 97.6 F | RESPIRATION RATE: 17 BRPM | BODY MASS INDEX: 46.95 KG/M2 | DIASTOLIC BLOOD PRESSURE: 68 MMHG | HEIGHT: 63 IN | WEIGHT: 265 LBS

## 2020-07-09 PROBLEM — F29 PSYCHOSIS (HCC): Status: RESOLVED | Noted: 2018-09-12 | Resolved: 2020-07-09

## 2020-07-09 PROBLEM — E66.01 MORBIDLY OBESE (HCC): Status: ACTIVE | Noted: 2020-07-09

## 2020-07-09 LAB
A/G RATIO: 1.6 (ref 1.1–2.2)
ALBUMIN SERPL-MCNC: 4.3 G/DL (ref 3.4–5)
ALP BLD-CCNC: 98 U/L (ref 40–129)
ALT SERPL-CCNC: 13 U/L (ref 10–40)
ANION GAP SERPL CALCULATED.3IONS-SCNC: 17 MMOL/L (ref 3–16)
AST SERPL-CCNC: 22 U/L (ref 15–37)
BILIRUB SERPL-MCNC: <0.2 MG/DL (ref 0–1)
BUN BLDV-MCNC: 17 MG/DL (ref 7–20)
CALCIUM SERPL-MCNC: 9.8 MG/DL (ref 8.3–10.6)
CHLORIDE BLD-SCNC: 100 MMOL/L (ref 99–110)
CHOLESTEROL, TOTAL: 180 MG/DL (ref 0–199)
CO2: 22 MMOL/L (ref 21–32)
CREAT SERPL-MCNC: 1.1 MG/DL (ref 0.6–1.2)
GFR AFRICAN AMERICAN: >60
GFR NON-AFRICAN AMERICAN: 50
GLOBULIN: 2.7 G/DL
GLUCOSE BLD-MCNC: 211 MG/DL (ref 70–99)
HBA1C MFR BLD: 7.4 %
HDLC SERPL-MCNC: 47 MG/DL (ref 40–60)
LDL CHOLESTEROL CALCULATED: 97 MG/DL
POTASSIUM SERPL-SCNC: 4 MMOL/L (ref 3.5–5.1)
SODIUM BLD-SCNC: 139 MMOL/L (ref 136–145)
TOTAL PROTEIN: 7 G/DL (ref 6.4–8.2)
TRIGL SERPL-MCNC: 182 MG/DL (ref 0–150)
VLDLC SERPL CALC-MCNC: 36 MG/DL

## 2020-07-09 PROCEDURE — 83036 HEMOGLOBIN GLYCOSYLATED A1C: CPT | Performed by: INTERNAL MEDICINE

## 2020-07-09 PROCEDURE — 3023F SPIROM DOC REV: CPT | Performed by: INTERNAL MEDICINE

## 2020-07-09 PROCEDURE — 3046F HEMOGLOBIN A1C LEVEL >9.0%: CPT | Performed by: INTERNAL MEDICINE

## 2020-07-09 PROCEDURE — 4004F PT TOBACCO SCREEN RCVD TLK: CPT | Performed by: INTERNAL MEDICINE

## 2020-07-09 PROCEDURE — 3017F COLORECTAL CA SCREEN DOC REV: CPT | Performed by: INTERNAL MEDICINE

## 2020-07-09 PROCEDURE — G8427 DOCREV CUR MEDS BY ELIG CLIN: HCPCS | Performed by: INTERNAL MEDICINE

## 2020-07-09 PROCEDURE — G8417 CALC BMI ABV UP PARAM F/U: HCPCS | Performed by: INTERNAL MEDICINE

## 2020-07-09 PROCEDURE — G9899 SCRN MAM PERF RSLTS DOC: HCPCS | Performed by: INTERNAL MEDICINE

## 2020-07-09 PROCEDURE — 99204 OFFICE O/P NEW MOD 45 MIN: CPT | Performed by: INTERNAL MEDICINE

## 2020-07-09 PROCEDURE — 36415 COLL VENOUS BLD VENIPUNCTURE: CPT | Performed by: INTERNAL MEDICINE

## 2020-07-09 PROCEDURE — G8926 SPIRO NO PERF OR DOC: HCPCS | Performed by: INTERNAL MEDICINE

## 2020-07-09 PROCEDURE — 2022F DILAT RTA XM EVC RTNOPTHY: CPT | Performed by: INTERNAL MEDICINE

## 2020-07-09 RX ORDER — METFORMIN HYDROCHLORIDE 500 MG/1
500 TABLET, EXTENDED RELEASE ORAL 2 TIMES DAILY
Qty: 60 TABLET | Refills: 5 | Status: SHIPPED | OUTPATIENT
Start: 2020-07-09 | End: 2021-01-11

## 2020-07-09 RX ORDER — FAMOTIDINE 20 MG/1
20 TABLET, FILM COATED ORAL 2 TIMES DAILY
Qty: 60 TABLET | Refills: 5 | Status: SHIPPED | OUTPATIENT
Start: 2020-07-09 | End: 2021-01-11

## 2020-07-09 RX ORDER — MULTIVITAMIN
1 TABLET ORAL DAILY
COMMUNITY
End: 2021-11-02 | Stop reason: CLARIF

## 2020-07-09 RX ORDER — PEN NEEDLE, DIABETIC 32GX 5/32"
1 NEEDLE, DISPOSABLE MISCELLANEOUS 4 TIMES DAILY PRN
Qty: 150 EACH | Refills: 5 | Status: SHIPPED | OUTPATIENT
Start: 2020-07-09 | End: 2020-07-14 | Stop reason: SDUPTHER

## 2020-07-09 RX ORDER — VARENICLINE TARTRATE
KIT
Qty: 1 BOX | Refills: 0 | Status: SHIPPED | OUTPATIENT
Start: 2020-07-09 | End: 2020-09-17

## 2020-07-09 RX ORDER — PERPHENAZINE 4 MG/1
4 TABLET, FILM COATED ORAL DAILY
COMMUNITY
End: 2021-11-02 | Stop reason: CLARIF

## 2020-07-09 NOTE — PROGRESS NOTES
Maryse Paulson   1955      Reason for visit:   Chief Complaint   Patient presents with    New Patient     est care         HPI:  Patient presents establish care. Former patient of Pawel Heath. She has a history of schizophrenia bipolar disorder. She follows with Dr. Pepper Nguyễn. She sees him every few months. She reports her mood is overall stable. She is a current smoker. She has a history of COPD currently on Anoro. She did have a CT while in the hospital for cholecystitis in February, no suspicious findings in the lungs but small bulla were noted in the lung apices. She has \"tried and tried\" to quit but has been unsuccessful. She has tried chantix in the past - wasn't effective but unsure if she took it long enough and thinks she would have been successful had she taken it for another month. No mood issues on the medication. She has GERD managed with PPI. States it is uncontrolled however. She reports it constantly burns and she feels it coming up in her mouth and her throat. She is had this for many years. She is never seen GI   or had an EGD. she has hypertension managed with ACE inhibitor and hydrochlorothiazide. She also takes metformin for type 2 diabetes mellitus. Last A1c was 7.7 and November 2019. She has a hard time following her diet. She is on statin therapy. She has OA and has been following with DR. Navi Nelson and [facility]. Has been getting injections in knee and hip. Of note, during her recent hospitalization in February, her CT abdomen did show a focal area of narrowing in the infrarenal abdominal aorta. Reports pain in her knee on the right side with some numbness/tingling her her thigh at times. He also reports chronic urinary incontinence. Has been on multiple occasions in the past but they were ineffective. She has been managing with pads and request a prescription which she gets through a mail order company.     Had a normal mammogram in December daily , Disp: , Rfl:     benztropine (COGENTIN) 1 MG tablet, Take 1 mg by mouth 2 times daily , Disp: , Rfl:     tamoxifen (NOLVADEX) 20 MG tablet, Take 20 mg by mouth daily , Disp: , Rfl:     OLANZapine (ZYPREXA) 7.5 MG tablet, Take 7.5 mg by mouth nightly , Disp: , Rfl:     zolpidem (AMBIEN) 5 MG tablet, Take 5 mg by mouth nightly as needed for Sleep. ., Disp: , Rfl:     Multiple Vitamin (MULTIVITAMIN) tablet, Take 1 tablet by mouth daily, Disp: , Rfl:     perphenazine 4 MG tablet, Take 4 mg by mouth daily, Disp: , Rfl:      Allergies   Allergen Reactions    Haldol [Haloperidol Lactate] Other (See Comments)     Lock jaw    Morphine Hives    Pcn [Penicillins] Hives       Past Surgical History:   Procedure Laterality Date    BREAST BIOPSY Right 2018    MAMMOGRAM GUIDED NEEDLE LOCALIZED RIGHT BREAST EXCISIONAL BIOPSY performed by Rosario Hung MD at [place] [address] Right 2018    CHOLECYSTECTOMY, LAPAROSCOPIC N/A 2020    LAPAROSCOPIC CHOLECYSTECTOMY WITH CHOLANGIOGRAMS POSSIBLE OPEN/ C-ARM performed by Deana Jha MD at [address] [place] [address] History   Problem Relation Age of Onset    Hypertension Mother     Heart Failure Mother     Mult Sclerosis Father     No Known Problems Maternal Grandmother     No Known Problems Maternal Grandfather     No Known Problems Paternal Grandmother     No Known Problems Paternal Grandfather     Lung Cancer Sister     Heart Failure Sister     Other Sister          SHORTLY AFTER BIRTH, WAS A TWIN        Social History     Socioeconomic History    Marital status:      Spouse name: Not on file    Number of children: Not on file    Years of education: Not on file    Highest education level: Not on file   Occupational History    Not on file   Social Needs    Financial resource strain: Not on file    Food insecurity     Worry: Not on file     Inability: Not on file   [employer] Transportation needs     Medical: Not on file     Non-medical: Not on file   Tobacco Use    Smoking status: Current Every Day Smoker     Packs/day: 1.00     Years: 40.00     Pack years: 40.00     Types: Cigarettes     Start date: 6/21/1976    Smokeless tobacco: Never Used   Substance and Sexual Activity    Alcohol use: No    Drug use: No    Sexual activity: Not Currently     Partners: Male   Lifestyle    Physical activity     Days per week: Not on file     Minutes per session: Not on file    Stress: Not on file   Relationships    Social connections     Talks on phone: Not on file     Gets together: Not on file     Attends Scientologist service: Not on file     Active member of club or organization: Not on file     Attends meetings of clubs or organizations: Not on file     Relationship status: Not on file    Intimate partner violence     Fear of current or ex partner: Not on file     Emotionally abused: Not on file     Physically abused: Not on file     Forced sexual activity: Not on file   Other Topics Concern    Not on file   Social History Narrative    Not on file       Review of Systems   Constitutional: Negative for chills, fatigue, fever and unexpected weight change. HENT: Negative for congestion, ear pain, sore throat and trouble swallowing. Eyes: Negative for pain and redness. Respiratory: Negative for cough and shortness of breath. Positive wheezing  Cardiovascular: Negative for chest pain, palpitations and leg swelling. Gastrointestinal: Negative for abdominal pain, blood in stool, constipation, diarrhea, nausea and vomiting. Positive heartburn  Endocrine: Negative for cold intolerance, heat intolerance, polydipsia and polyuria. Genitourinary: Negative for dysuria, frequency and hematuria. Positive incontinence  Musculoskeletal: Negative for arthralgias, myalgias and joint swelling. Skin: Negative for rash. Neurological: Negative for weakness, numbness and headaches. daily  -     Incontinence Supply Disposable ([employer] INCONTINENCE PADS) MISC; 1 Units by Does not apply route 4 times daily as needed (urinary incontinence)    Return to clinic in 3 months, Pap smear at that time    Delisa Lambert M.D. Internal Medicine and Pediatrics  [facility]    Please be advised that portions of this note were dictated with the use of Dragon which may result in linguistic errors. Please contact me should there be any questions.

## 2020-07-09 NOTE — TELEPHONE ENCOUNTER
PT leaving fax number for [employer], Pt says PCP has form that needs to be faxed back to [employer].          [phone]

## 2020-07-10 ENCOUNTER — TELEPHONE (OUTPATIENT)
Dept: FAMILY MEDICINE CLINIC | Age: 65
End: 2020-07-10

## 2020-07-10 NOTE — TELEPHONE ENCOUNTER
CTA scheduled for 7/17/20 @ 10:20am at [facility]. I called and left VM requsting a return. call    -Arrive 15min prior   -NPO 4 hrs prior   -insurance cards  -check in at information they will direct.

## 2020-07-13 ENCOUNTER — TELEPHONE (OUTPATIENT)
Dept: FAMILY MEDICINE CLINIC | Age: 65
End: 2020-07-13

## 2020-07-13 NOTE — TELEPHONE ENCOUNTER
PT called back in regarding message.  She said that she does not know what this call was in reference to because she didn't have any paperwork for Dr. Taylor Maya

## 2020-07-13 NOTE — TELEPHONE ENCOUNTER
PT called in regarding message to call back. Informed of Dr. Karri Aguirre note that PT's labs looked good.

## 2020-07-13 NOTE — TELEPHONE ENCOUNTER
LVM again requesting return call. Please ask what she needs to know, so I can address it before calling her back. She did not give me any paperwork to fill out. Dr Douglas Reynaga says she didn't give it to her either.

## 2020-07-14 RX ORDER — PEN NEEDLE, DIABETIC 32GX 5/32"
1 NEEDLE, DISPOSABLE MISCELLANEOUS 4 TIMES DAILY PRN
Qty: 150 EACH | Refills: 5 | Status: SHIPPED | OUTPATIENT
Start: 2020-07-14 | End: 2021-11-02 | Stop reason: CLARIF

## 2020-07-14 NOTE — TELEPHONE ENCOUNTER
Pt returning call.  All that is needed is a written script for incontinence pads faxed to [employer] at [phone]

## 2020-07-14 NOTE — TELEPHONE ENCOUNTER
LVM for pt to return call. Dr Светлана Solorio does not have this paper, I got on [employer] site and couldn't find a printable version. Please have her drop it off or have the company fax another copy to us.

## 2020-07-17 ENCOUNTER — HOSPITAL ENCOUNTER (OUTPATIENT)
Dept: CT IMAGING | Age: 65
Discharge: HOME | End: 2020-07-17
Payer: MEDICAID

## 2020-07-17 PROCEDURE — 6360000004 HC RX CONTRAST MEDICATION: Performed by: INTERNAL MEDICINE

## 2020-07-17 PROCEDURE — 75635 CT ANGIO ABDOMINAL ARTERIES: CPT

## 2020-07-17 RX ADMIN — IOPAMIDOL 75 ML: 755 INJECTION, SOLUTION INTRAVENOUS at 10:22

## 2020-07-20 ENCOUNTER — TELEPHONE (OUTPATIENT)
Dept: FAMILY MEDICINE CLINIC | Age: 65
End: 2020-07-20

## 2020-07-20 NOTE — TELEPHONE ENCOUNTER
PT called in requesting a refill of the rest of her medications. When asked which medications that she needed refilled pt said \"I don't know. They have a list of them. \"     Refills need to be sent to [facility] on [address]

## 2020-07-21 RX ORDER — HYDROCHLOROTHIAZIDE 25 MG/1
25 TABLET ORAL DAILY
Qty: 90 TABLET | Refills: 3 | Status: SHIPPED | OUTPATIENT
Start: 2020-07-21 | End: 2021-06-07

## 2020-07-21 RX ORDER — PRAVASTATIN SODIUM 10 MG
10 TABLET ORAL DAILY
Qty: 90 TABLET | Refills: 3 | Status: SHIPPED | OUTPATIENT
Start: 2020-07-21 | End: 2021-06-07

## 2020-07-21 RX ORDER — PANTOPRAZOLE SODIUM 40 MG/1
TABLET, DELAYED RELEASE ORAL
Qty: 90 TABLET | Refills: 3 | Status: SHIPPED | OUTPATIENT
Start: 2020-07-21 | End: 2021-06-07

## 2020-07-31 ENCOUNTER — TELEPHONE (OUTPATIENT)
Dept: FAMILY MEDICINE CLINIC | Age: 65
End: 2020-07-31

## 2020-07-31 NOTE — TELEPHONE ENCOUNTER
States just spoke with someone, and stress incontinence will not work. States this is not Dx. Needing to know the reason for the incontinence.  Pt is reachable at [phone]

## 2020-07-31 NOTE — TELEPHONE ENCOUNTER
The [employer] company did not accept stress incontinence as a dx.  [phone]  Sue Creed- [employer]     Calling to straighten it.

## 2020-08-03 NOTE — TELEPHONE ENCOUNTER
Ask patient - I am not aware of any direct cause of her incontinence - was she able to get these covered in the past?

## 2020-08-03 NOTE — TELEPHONE ENCOUNTER
Per the pt, she is unsure of the dx. Says Mixed stress is what they are looking for.    She has never had these covered

## 2020-08-03 NOTE — TELEPHONE ENCOUNTER
im not s ure waht they mean by cause? Makes no sense to me?  Is there someone that can explain - we put a diagnosis code not a cause

## 2020-08-03 NOTE — TELEPHONE ENCOUNTER
Apparently this is not qualifying dx, since it is not what caused it. The pt has no idea, so I don't know what else we can do at this point.

## 2020-08-10 NOTE — TELEPHONE ENCOUNTER
Going over this with the pt, she stated she doesn't know what caused it or what medical issue it was . She stated it was dx by Dr. Mayur Jauregui.

## 2020-08-13 RX ORDER — LISINOPRIL 10 MG/1
10 TABLET ORAL DAILY
Qty: 30 TABLET | Refills: 5 | Status: SHIPPED | OUTPATIENT
Start: 2020-08-13 | End: 2021-03-09

## 2020-08-20 ENCOUNTER — OFFICE VISIT (OUTPATIENT)
Dept: ORTHOPEDIC SURGERY | Age: 65
End: 2020-08-20
Payer: MEDICAID

## 2020-08-20 VITALS — WEIGHT: 268 LBS | HEIGHT: 63 IN | BODY MASS INDEX: 47.48 KG/M2 | TEMPERATURE: 98.1 F

## 2020-08-20 PROCEDURE — 99213 OFFICE O/P EST LOW 20 MIN: CPT | Performed by: PHYSICIAN ASSISTANT

## 2020-08-20 PROCEDURE — 3017F COLORECTAL CA SCREEN DOC REV: CPT | Performed by: PHYSICIAN ASSISTANT

## 2020-08-20 PROCEDURE — 4004F PT TOBACCO SCREEN RCVD TLK: CPT | Performed by: PHYSICIAN ASSISTANT

## 2020-08-20 PROCEDURE — 20610 DRAIN/INJ JOINT/BURSA W/O US: CPT | Performed by: PHYSICIAN ASSISTANT

## 2020-08-20 PROCEDURE — G9899 SCRN MAM PERF RSLTS DOC: HCPCS | Performed by: PHYSICIAN ASSISTANT

## 2020-08-20 PROCEDURE — G8427 DOCREV CUR MEDS BY ELIG CLIN: HCPCS | Performed by: PHYSICIAN ASSISTANT

## 2020-08-20 PROCEDURE — G8417 CALC BMI ABV UP PARAM F/U: HCPCS | Performed by: PHYSICIAN ASSISTANT

## 2020-08-28 NOTE — PROGRESS NOTES
This dictation was done with [employer] dictation and may contain mechanical errors related to translation. Temperature 98.1 °F (36.7 °C), height 5' 3\" (1.6 m), weight 268 lb (121.6 kg), not currently breastfeeding. This is a pleasant 37-year-old female who is here in follow-up for pain in both of her knees. She has established osteoarthritis and was sent for x-rays at the office today. The AP lateral and sunrise view of her right knee shows a loss of joint space in the medial compartment with subchondral sclerosis and some osteophyte formation. The left knee 3 view x-rays shows similar findings consistent for osteoarthritis without fracture or other bone abnormality there is no obvious fracture seen she has some mild tricompartmental osteoarthritis. This was shown to the patient. On examination today this is a pleasant 37-year-old female no acute distress alert and x3 she is able to walk without antalgia she has close to full extension and bends to 130 degrees bilaterally. She has crepitus during flexion extension through the patellofemoral joint but negative for Beverly negative for Lockman. She has fair quad tone and some tightness to her hamstring and no radiculopathy with a straight leg raise. Impression is bilateral knee osteoarthritis. After a discussion of the multiple options, they consented to a cortisone shot. 1 ml of 40mg/ml Kenalog and 2 ml's of 0.25%Marcaine were injected into both the right and the left knee joint spaces. The leg was slightly flexed and injected just lateral to the patella tendon to under the patella. This was done with sterile technique and they tolerated it well.

## 2020-09-14 ENCOUNTER — TELEPHONE (OUTPATIENT)
Dept: FAMILY MEDICINE CLINIC | Age: 65
End: 2020-09-14

## 2020-09-14 NOTE — TELEPHONE ENCOUNTER
Sounds like an issue with urinary incontinence - can schedule routinely unless something else is going on

## 2020-09-14 NOTE — TELEPHONE ENCOUNTER
----- Message from Rizwan Frank RN sent at 9/12/2020  1:37 PM EDT -----  Subject: Message to Provider    QUESTIONS  Information for Provider? Pt called in to report urinary incontinence   during activity. Pt would like to know what next steps she should take. Please advise.   ---------------------------------------------------------------------------  --------------  CALL BACK INFO  What is the best way for the office to contact you? OK to leave message on   voicemail  Preferred Call Back Phone Number? [phone]  ---------------------------------------------------------------------------  --------------  SCRIPT ANSWERS  Relationship to Patient? Self  Appointment reason? Symptomatic  Select script based on patient symptoms? Adult Female Urinary Symptoms   [Urine-related   UTI   Bladder Infection]  Are you having back pain with your urinary symptoms? No  Are you having vomiting or nausea? No  Are you having fevers (100.4)   chills   or sweats? No  Are you having increased thirst? No  Is there blood in your urine? No  (Is the patient requesting to be seen urgently for their symptoms?)? No  Have you been seen by a provider for this symptom before?  Yes

## 2020-09-15 NOTE — TELEPHONE ENCOUNTER
Superficial stasis ulceration and clean the area with saline culture obtained also placed a combine dressing Unna boot and Coban wrap and follow-up in 1 week scheduled

## 2020-09-16 NOTE — TELEPHONE ENCOUNTER
Patient called for refill of chantix, I saw that she was already on it so I pended a continuing month shalini for you, last seen 7/9/2020, next 10/2/2020

## 2020-09-17 RX ORDER — VARENICLINE TARTRATE 1 MG/1
1 TABLET, FILM COATED ORAL 2 TIMES DAILY
Qty: 60 TABLET | Refills: 3 | Status: SHIPPED | OUTPATIENT
Start: 2020-09-17 | Stop reason: SDUPTHER

## 2020-10-02 ENCOUNTER — OFFICE VISIT (OUTPATIENT)
Dept: FAMILY MEDICINE CLINIC | Age: 65
End: 2020-10-02
Payer: MEDICAID

## 2020-10-02 VITALS
WEIGHT: 258 LBS | HEART RATE: 103 BPM | SYSTOLIC BLOOD PRESSURE: 126 MMHG | DIASTOLIC BLOOD PRESSURE: 78 MMHG | OXYGEN SATURATION: 98 % | BODY MASS INDEX: 45.7 KG/M2 | RESPIRATION RATE: 16 BRPM

## 2020-10-02 PROCEDURE — 99213 OFFICE O/P EST LOW 20 MIN: CPT | Performed by: INTERNAL MEDICINE

## 2020-10-02 PROCEDURE — G9899 SCRN MAM PERF RSLTS DOC: HCPCS | Performed by: INTERNAL MEDICINE

## 2020-10-02 PROCEDURE — G8427 DOCREV CUR MEDS BY ELIG CLIN: HCPCS | Performed by: INTERNAL MEDICINE

## 2020-10-02 PROCEDURE — G8417 CALC BMI ABV UP PARAM F/U: HCPCS | Performed by: INTERNAL MEDICINE

## 2020-10-02 PROCEDURE — 3017F COLORECTAL CA SCREEN DOC REV: CPT | Performed by: INTERNAL MEDICINE

## 2020-10-02 PROCEDURE — G8484 FLU IMMUNIZE NO ADMIN: HCPCS | Performed by: INTERNAL MEDICINE

## 2020-10-02 PROCEDURE — 4004F PT TOBACCO SCREEN RCVD TLK: CPT | Performed by: INTERNAL MEDICINE

## 2020-10-02 RX ORDER — LANOLIN ALCOHOL/MO/W.PET/CERES
3 CREAM (GRAM) TOPICAL NIGHTLY
COMMUNITY

## 2020-10-02 NOTE — PROGRESS NOTES
10/2/2020     Cira Han (:  1955) is a 63 y.o. female, here for evaluation of the following medical concerns:    HPI  Patient presents to discuss her incontinence. Long statinging for 19 years. Wears pads as she dribbles spontaneously. Has tried multiple medications in the past without an effect. Typically triggered by coughing and sneezing. Had 1 child. No known bladder/uterine prolapse. Has never seen a urologist. Had a polyp removed from uterus from tamoxifen which she was in for her breast cancer. No hysteretcomy      Has  Toenail fungus she wants treatment for    Review of Systems   Constitutional: Negative for fever. Genitourinary: Negative for difficulty urinating, dysuria, flank pain, frequency, hematuria, pelvic pain and urgency. Prior to Visit Medications    Medication Sig Taking? Authorizing Provider   ciclopirox (PENLAC) 8 % solution Apply topically nightly. Yes Brandon Henry MD   varenicline (CHANTIX CONTINUING MONTH WILFRID) 1 MG tablet Take 1 tablet by mouth 2 times daily Yes Brandon Henry MD   lisinopril (PRINIVIL;ZESTRIL) 10 MG tablet Take 1 tablet by mouth daily Yes Brandon Henry MD   hydroCHLOROthiazide (HYDRODIURIL) 25 MG tablet Take 1 tablet by mouth daily Yes Brandon Henry MD   pravastatin (PRAVACHOL) 10 MG tablet Take 1 tablet by mouth daily Yes Brandon Henry MD   pantoprazole (PROTONIX) 40 MG tablet TAKE ONE TABLET BY MOUTH DAILY Yes Brandon Henry MD   carbidopa-levodopa (SINEMET)  MG per tablet Start with 0.5 tabs TID.  Uptitrate to 2 tabs TID over 4 weeks Yes Historical Provider, MD   perphenazine 4 MG tablet Take 4 mg by mouth daily Yes Historical Provider, MD   metFORMIN (GLUCOPHAGE-XR) 500 MG extended release tablet Take 1 tablet by mouth 2 times daily Yes Brandon Henry MD   famotidine (PEPCID) 20 MG tablet Take 1 tablet by mouth 2 times daily Yes Brandon Henry MD   umeclidinium-vilanterol [facility] ELLIPTA) 62.5-25 MCG/INH AEPB inhaler Inhale 1 puff into the lungs daily Yes Shelley Barajas APRN - CNP   albuterol sulfate HFA (VENTOLIN HFA) 108 (90 Base) MCG/ACT inhaler Inhale 2 puffs into the lungs every 6 hours as needed for Wheezing Yes ОЛЬГА Funes CNP   sertraline (ZOLOFT) 100 MG tablet Take 100 mg by mouth daily  Yes Historical Provider, MD   benztropine (COGENTIN) 1 MG tablet Take 1 mg by mouth 2 times daily  Yes Historical Provider, MD   tamoxifen (NOLVADEX) 20 MG tablet Take 20 mg by mouth daily  Yes Historical Provider, MD   OLANZapine (ZYPREXA) 7.5 MG tablet Take 7.5 mg by mouth nightly  Yes Historical Provider, MD   zolpidem (AMBIEN) 5 MG tablet Take 5 mg by mouth nightly as needed for Sleep. . Yes Historical Provider, MD   melatonin 3 MG TABS tablet   Historical Provider, MD   Incontinence Supply Disposable ([employer] INCONTINENCE PADS) MISC 1 Units by Does not apply route 4 times daily as needed (urinary incontinence)  Patient not taking: Reported on 10/2/2020  Socorro Posada MD   Multiple Vitamin (MULTIVITAMIN) tablet Take 1 tablet by mouth daily  Historical Provider, MD        Social History     Tobacco Use    Smoking status: Current Every Day Smoker     Packs/day: 1.00     Years: 40.00     Pack years: 40.00     Types: Cigarettes     Start date: 6/21/1976    Smokeless tobacco: Never Used    Tobacco comment: Using Chantix She is  trying to stop   Substance Use Topics    Alcohol use: No        Vitals:    10/02/20 1032   BP: 126/78   Pulse: 103   Resp: 16   SpO2: 98%   Weight: 258 lb (117 kg)     Estimated body mass index is 45.7 kg/m² as calculated from the following:    Height as of 8/20/20: 5' 3\" (1.6 m). Weight as of this encounter: 258 lb (117 kg). Physical Exam  Constitutional:       Appearance: Normal appearance. Cardiovascular:      Rate and Rhythm: Normal rate and regular rhythm. Pulmonary:      Effort: Pulmonary effort is normal.      Breath sounds: Normal breath sounds. Abdominal:      General: Bowel sounds are normal. There is no distension. Tenderness: There is no abdominal tenderness. There is no right CVA tenderness, left CVA tenderness, guarding or rebound. Neurological:      Mental Status: She is alert. right great toe with thick mycotic nail    ASSESSMENT/PLAN:  1. Stress incontinence  Longstanding. Never has had much benefit from medication. Interested in urology evaluation. Return for pap as well    2. Morbidly obese ([address]  Weight loss may help and have caused above issue, discussed    3. Fungal infection of toenail  Topical therapy . Consider podiatry if she prefers in the future. Return in about 1 week (around 10/9/2020). An electronic signature was used to authenticate this note.     --Rebecca Landa MD on 10/2/2020 at 11:01 AM

## 2020-10-07 ENCOUNTER — TELEPHONE (OUTPATIENT)
Dept: FAMILY MEDICINE CLINIC | Age: 65
End: 2020-10-07

## 2020-10-15 ENCOUNTER — OFFICE VISIT (OUTPATIENT)
Dept: FAMILY MEDICINE CLINIC | Age: 65
End: 2020-10-15
Payer: MEDICAID

## 2020-10-15 VITALS
HEART RATE: 111 BPM | BODY MASS INDEX: 45.7 KG/M2 | WEIGHT: 258 LBS | OXYGEN SATURATION: 97 % | DIASTOLIC BLOOD PRESSURE: 70 MMHG | SYSTOLIC BLOOD PRESSURE: 121 MMHG | RESPIRATION RATE: 16 BRPM

## 2020-10-15 LAB — HBA1C MFR BLD: 7.5 %

## 2020-10-15 PROCEDURE — G0296 VISIT TO DETERM LDCT ELIG: HCPCS | Performed by: INTERNAL MEDICINE

## 2020-10-15 PROCEDURE — 99396 PREV VISIT EST AGE 40-64: CPT | Performed by: INTERNAL MEDICINE

## 2020-10-15 PROCEDURE — G8484 FLU IMMUNIZE NO ADMIN: HCPCS | Performed by: INTERNAL MEDICINE

## 2020-10-15 PROCEDURE — 83036 HEMOGLOBIN GLYCOSYLATED A1C: CPT | Performed by: INTERNAL MEDICINE

## 2020-10-15 NOTE — PROGRESS NOTES
10/15/2020     Jem Adamson (:  1955) is a 63 y.o. female, here for evaluation of the following medical concerns:    HPI  Patient presents for routine cervical cancer screening. Does not recall last pap. No pelvic complaints. Postmenopausal and no bleeding    Mammogram due in December    Smoked 1-2 ppd for 47 years and overdue for LDCT - agreeable    Patient states she is a prediabetic. Based on her last a1c, she is informed that she is indeed a diabetic. Taking metformin only once daily (prescribed twice however). Review of Systems   Genitourinary: Negative for menstrual problem, pelvic pain, vaginal bleeding, vaginal discharge and vaginal pain. Prior to Visit Medications    Medication Sig Taking? Authorizing Provider   melatonin 3 MG TABS tablet  Yes Historical Provider, MD   ciclopirox (PENLAC) 8 % solution Apply topically nightly. Yes Trina Lepe MD   varenicline (CHANTIX CONTINUING MONTH WILFRID) 1 MG tablet Take 1 tablet by mouth 2 times daily Yes Trina Lepe MD   lisinopril (PRINIVIL;ZESTRIL) 10 MG tablet Take 1 tablet by mouth daily Yes Trina Lepe MD   hydroCHLOROthiazide (HYDRODIURIL) 25 MG tablet Take 1 tablet by mouth daily Yes Trina Lepe MD   pravastatin (PRAVACHOL) 10 MG tablet Take 1 tablet by mouth daily Yes Trina Lepe MD   pantoprazole (PROTONIX) 40 MG tablet TAKE ONE TABLET BY MOUTH DAILY Yes Trina Lepe MD   carbidopa-levodopa (SINEMET)  MG per tablet Start with 0.5 tabs TID.  Uptitrate to 2 tabs TID over 4 weeks Yes Historical Provider, MD   perphenazine 4 MG tablet Take 4 mg by mouth daily Yes Historical Provider, MD   metFORMIN (GLUCOPHAGE-XR) 500 MG extended release tablet Take 1 tablet by mouth 2 times daily Yes Trina Lepe MD   famotidine (PEPCID) 20 MG tablet Take 1 tablet by mouth 2 times daily Yes Trina Lepe MD   umeclidinium-vilanterol ([employer]O ELLIPTA) 62.5-25 MCG/INH AEPB inhaler Inhale 1 puff into the lungs daily Yes Joes HeartОЛЬГА CNP   albuterol sulfate HFA (VENTOLIN HFA) 108 (90 Base) MCG/ACT inhaler Inhale 2 puffs into the lungs every 6 hours as needed for Wheezing Yes Alfreda MadrigalkatieОЛЬГА CNP   sertraline (ZOLOFT) 100 MG tablet Take 100 mg by mouth daily  Yes Historical Provider, MD   benztropine (COGENTIN) 1 MG tablet Take 1 mg by mouth 2 times daily  Yes Historical Provider, MD   tamoxifen (NOLVADEX) 20 MG tablet Take 20 mg by mouth daily  Yes Historical Provider, MD   OLANZapine (ZYPREXA) 7.5 MG tablet Take 7.5 mg by mouth nightly  Yes Historical Provider, MD   zolpidem (AMBIEN) 5 MG tablet Take 5 mg by mouth nightly as needed for Sleep. . Yes Historical Provider, MD   Incontinence Supply Disposable ([employer] INCONTINENCE PADS) MISC 1 Units by Does not apply route 4 times daily as needed (urinary incontinence)  Patient not taking: Reported on 10/2/2020  Linda Phillips MD   Multiple Vitamin (MULTIVITAMIN) tablet Take 1 tablet by mouth daily  Historical Provider, MD        Social History     Tobacco Use    Smoking status: Current Every Day Smoker     Packs/day: 1.00     Years: 40.00     Pack years: 40.00     Types: Cigarettes     Start date: 6/21/1976    Smokeless tobacco: Never Used    Tobacco comment: Using Chantix She is  trying to stop   Substance Use Topics    Alcohol use: No        Vitals:    10/15/20 1149   BP: 121/70   Pulse: 111   Resp: 16   SpO2: 97%   Weight: 258 lb (117 kg)     Estimated body mass index is 45.7 kg/m² as calculated from the following:    Height as of 8/20/20: 5' 3\" (1.6 m). Weight as of this encounter: 258 lb (117 kg). Physical Exam  Vitals signs reviewed. Constitutional:       Appearance: Normal appearance. Genitourinary:     General: Normal vulva. Pubic Area: No rash. Labia:         Right: No rash, tenderness or lesion. Left: No rash, tenderness or lesion.        Cervix: No cervical motion tenderness, discharge, friability, lesion, erythema or cervical bleeding. Adnexa: Right adnexa normal and left adnexa normal.        Right: No mass, tenderness or fullness. Left: No mass or tenderness. Neurological:      Mental Status: She is alert. ASSESSMENT/PLAN:  1. Encounter for screening mammogram for breast cancer    - CHRISS DIGITAL SCREEN W OR WO CAD BILATERAL; Future    2. Personal history of tobacco use    - AK VISIT TO DISCUSS LUNG CA SCREEN W LDCT  - CT Lung Screen (Annual); Future    3. Type 2 diabetes mellitus without complication, without long-term current use of insulin ([facility] [address]  She is informed she is a diabetic. Discussed importance of dietary changes. Recommend increasing the Metformin to twice daily as prescribed. Follow-up A1c in 3 months  - POCT glycosylated hemoglobin (Hb A1C)    4. Encounter for gynecological examination  Normal exam although difficult due to body habitus. Will send for cotesting  - PAP SMEAR      Return in about 4 months (around 2/15/2021). An electronic signature was used to authenticate this note. --Linda Phillips MD on 10/15/2020 at 12:56 PM  Low Dose CT (LDCT) Lung Screening criteria met   Age 50-69   Pack year smoking >30   Still smoking or less than 15 year since quit   No sign or symptoms of lung cancer   > 11 months since last LDCT     Risks and benefits of lung cancer screening with LDCT scans discussed:    Significance of positive screen - False-positive LDCT results often occur. 95% of all positive results do not lead to a diagnosis of cancer. Usually further imaging can resolve most false-positive results; however, some patients may require invasive procedures. Over diagnosis risk - 10% to 12% of screen-detected lung cancer cases are over diagnosed--that is, the cancer would not have been detected in the patient's lifetime without the screening.     Need for follow up screens annually to continue lung cancer screening effectiveness     Risks associated with radiation from annual LDCT- Radiation exposure is about the same as for a mammogram, which is about 1/3 of the annual background radiation exposure from everyday life. Starting screening at age 54 is not likely to increase cancer risk from radiation exposure. Patients with comorbidities resulting in life expectancy of < 10 years, or that would preclude treatment of an abnormality identified on CT, should not be screened due to lack of benefit.     To obtain maximal benefit from this screening, smoking cessation and long-term abstinence from smoking is critical

## 2020-10-19 LAB
HPV COMMENT: NORMAL
HPV TYPE 16: NOT DETECTED
HPV TYPE 18: NOT DETECTED
HPVOH (OTHER TYPES): NOT DETECTED

## 2020-10-23 ENCOUNTER — TELEPHONE (OUTPATIENT)
Dept: FAMILY MEDICINE CLINIC | Age: 65
End: 2020-10-23

## 2020-10-23 NOTE — TELEPHONE ENCOUNTER
[employer] called in regarding patient. They were following up with a call with Alina Nelson about PT's incontinence supplies. They said that Alina Nelson was going to speak to Dr. Richie Morales.      Please call back: [phone] ext: [phone]

## 2020-10-29 ENCOUNTER — TELEPHONE (OUTPATIENT)
Dept: FAMILY MEDICINE CLINIC | Age: 65
End: 2020-10-29

## 2020-10-29 NOTE — TELEPHONE ENCOUNTER
It looks like an old instruction showed up on there- find out from her how much she is currently taking please

## 2020-10-30 ENCOUNTER — TELEPHONE (OUTPATIENT)
Dept: FAMILY MEDICINE CLINIC | Age: 65
End: 2020-10-30

## 2020-10-30 NOTE — TELEPHONE ENCOUNTER
Dr Fern Patel,         We received paperwork from dentist asking if IV sedation is safe since has her COPD. Thought I would ask you since I have never seen her. Is it ok to sign?

## 2020-11-02 NOTE — TELEPHONE ENCOUNTER
She is pretty stable from COPD perspective and I wouldn't expect a problem for her - but would recommend close monitoring if sedation has to be IV and not local (I presume they already have protocol in place)

## 2021-01-15 ENCOUNTER — TELEPHONE (OUTPATIENT)
Dept: FAMILY MEDICINE CLINIC | Age: 66
End: 2021-01-15

## 2021-01-15 NOTE — TELEPHONE ENCOUNTER
PT called in asking that we no longer refill her prescription for carbidopa-levodopa.      Best call back number: [phone]

## 2021-01-20 NOTE — TELEPHONE ENCOUNTER
PT states that the neurologist at [facility] took her off it because it was not working/

## 2021-01-22 ENCOUNTER — HOSPITAL ENCOUNTER (OUTPATIENT)
Dept: CT IMAGING | Age: 66
Discharge: HOME | End: 2021-01-22
Payer: MEDICAID

## 2021-01-22 ENCOUNTER — TELEPHONE (OUTPATIENT)
Dept: FAMILY MEDICINE CLINIC | Age: 66
End: 2021-01-22

## 2021-01-22 ENCOUNTER — HOSPITAL ENCOUNTER (OUTPATIENT)
Dept: WOMENS IMAGING | Age: 66
Discharge: HOME | End: 2021-01-22
Payer: MEDICAID

## 2021-01-22 DIAGNOSIS — Z87.891 PERSONAL HISTORY OF TOBACCO USE: ICD-10-CM

## 2021-01-22 DIAGNOSIS — Z12.31 ENCOUNTER FOR SCREENING MAMMOGRAM FOR BREAST CANCER: ICD-10-CM

## 2021-01-22 PROCEDURE — 71271 CT THORAX LUNG CANCER SCR C-: CPT

## 2021-01-22 PROCEDURE — 77067 SCR MAMMO BI INCL CAD: CPT

## 2021-02-08 RX ORDER — FAMOTIDINE 20 MG/1
TABLET, FILM COATED ORAL
Qty: 60 TABLET | Refills: 3 | Status: SHIPPED | OUTPATIENT
Start: 2021-02-08 | End: 2021-06-07

## 2021-02-08 RX ORDER — METFORMIN HYDROCHLORIDE 500 MG/1
TABLET, EXTENDED RELEASE ORAL
Qty: 60 TABLET | Refills: 3 | Status: SHIPPED | OUTPATIENT
Start: 2021-02-08 | End: 2021-03-09

## 2021-03-09 ENCOUNTER — OFFICE VISIT (OUTPATIENT)
Dept: FAMILY MEDICINE CLINIC | Age: 66
End: 2021-03-09
Payer: MEDICARE

## 2021-03-09 VITALS
TEMPERATURE: 97.8 F | BODY MASS INDEX: 46.23 KG/M2 | HEART RATE: 71 BPM | WEIGHT: 261 LBS | RESPIRATION RATE: 14 BRPM | SYSTOLIC BLOOD PRESSURE: 124 MMHG | DIASTOLIC BLOOD PRESSURE: 68 MMHG | OXYGEN SATURATION: 97 %

## 2021-03-09 DIAGNOSIS — Z72.0 TOBACCO ABUSE: ICD-10-CM

## 2021-03-09 DIAGNOSIS — I10 ESSENTIAL HYPERTENSION: ICD-10-CM

## 2021-03-09 DIAGNOSIS — E11.9 TYPE 2 DIABETES MELLITUS WITHOUT COMPLICATION, WITHOUT LONG-TERM CURRENT USE OF INSULIN (HCC): Primary | ICD-10-CM

## 2021-03-09 DIAGNOSIS — R25.1 TREMOR: ICD-10-CM

## 2021-03-09 DIAGNOSIS — E78.5 HYPERLIPIDEMIA, UNSPECIFIED HYPERLIPIDEMIA TYPE: ICD-10-CM

## 2021-03-09 DIAGNOSIS — J42 CHRONIC BRONCHITIS, UNSPECIFIED CHRONIC BRONCHITIS TYPE (HCC): ICD-10-CM

## 2021-03-09 DIAGNOSIS — K21.9 CHRONIC GERD: ICD-10-CM

## 2021-03-09 LAB
A/G RATIO: 1.3 (ref 1.1–2.2)
ALBUMIN SERPL-MCNC: 3.9 G/DL (ref 3.4–5)
ALP BLD-CCNC: 114 U/L (ref 40–129)
ALT SERPL-CCNC: 20 U/L (ref 10–40)
ANION GAP SERPL CALCULATED.3IONS-SCNC: 13 MMOL/L (ref 3–16)
AST SERPL-CCNC: 17 U/L (ref 15–37)
BILIRUB SERPL-MCNC: <0.2 MG/DL (ref 0–1)
BUN BLDV-MCNC: 20 MG/DL (ref 7–20)
CALCIUM SERPL-MCNC: 9.4 MG/DL (ref 8.3–10.6)
CHLORIDE BLD-SCNC: 100 MMOL/L (ref 99–110)
CHOLESTEROL, TOTAL: 180 MG/DL (ref 0–199)
CO2: 25 MMOL/L (ref 21–32)
CREAT SERPL-MCNC: 1.1 MG/DL (ref 0.6–1.2)
GFR AFRICAN AMERICAN: >60
GFR NON-AFRICAN AMERICAN: 50
GLOBULIN: 3.1 G/DL
GLUCOSE BLD-MCNC: 312 MG/DL (ref 70–99)
HBA1C MFR BLD: 8.2 %
HDLC SERPL-MCNC: 37 MG/DL (ref 40–60)
LDL CHOLESTEROL CALCULATED: 95 MG/DL
POTASSIUM SERPL-SCNC: 4.8 MMOL/L (ref 3.5–5.1)
SODIUM BLD-SCNC: 138 MMOL/L (ref 136–145)
TOTAL PROTEIN: 7 G/DL (ref 6.4–8.2)
TRIGL SERPL-MCNC: 240 MG/DL (ref 0–150)
VLDLC SERPL CALC-MCNC: 48 MG/DL

## 2021-03-09 PROCEDURE — 83036 HEMOGLOBIN GLYCOSYLATED A1C: CPT | Performed by: INTERNAL MEDICINE

## 2021-03-09 PROCEDURE — 3023F SPIROM DOC REV: CPT | Performed by: INTERNAL MEDICINE

## 2021-03-09 PROCEDURE — 99214 OFFICE O/P EST MOD 30 MIN: CPT | Performed by: INTERNAL MEDICINE

## 2021-03-09 PROCEDURE — G8428 CUR MEDS NOT DOCUMENT: HCPCS | Performed by: INTERNAL MEDICINE

## 2021-03-09 PROCEDURE — 4004F PT TOBACCO SCREEN RCVD TLK: CPT | Performed by: INTERNAL MEDICINE

## 2021-03-09 PROCEDURE — 36415 COLL VENOUS BLD VENIPUNCTURE: CPT | Performed by: INTERNAL MEDICINE

## 2021-03-09 PROCEDURE — G8484 FLU IMMUNIZE NO ADMIN: HCPCS | Performed by: INTERNAL MEDICINE

## 2021-03-09 PROCEDURE — G8417 CALC BMI ABV UP PARAM F/U: HCPCS | Performed by: INTERNAL MEDICINE

## 2021-03-09 PROCEDURE — 3017F COLORECTAL CA SCREEN DOC REV: CPT | Performed by: INTERNAL MEDICINE

## 2021-03-09 PROCEDURE — G8926 SPIRO NO PERF OR DOC: HCPCS | Performed by: INTERNAL MEDICINE

## 2021-03-09 PROCEDURE — 2022F DILAT RTA XM EVC RTNOPTHY: CPT | Performed by: INTERNAL MEDICINE

## 2021-03-09 PROCEDURE — 3052F HG A1C>EQUAL 8.0%<EQUAL 9.0%: CPT | Performed by: INTERNAL MEDICINE

## 2021-03-09 RX ORDER — LISINOPRIL 10 MG/1
TABLET ORAL
Qty: 30 TABLET | Refills: 4 | Status: SHIPPED | OUTPATIENT
Start: 2021-03-09 | End: 2021-07-06

## 2021-03-09 RX ORDER — OXYBUTYNIN CHLORIDE 10 MG/1
TABLET, EXTENDED RELEASE ORAL
COMMUNITY
Start: 2021-02-07 | End: 2021-11-02 | Stop reason: CLARIF

## 2021-03-09 RX ORDER — METFORMIN HYDROCHLORIDE 500 MG/1
1000 TABLET, EXTENDED RELEASE ORAL 2 TIMES DAILY
Qty: 120 TABLET | Refills: 5 | Status: SHIPPED | OUTPATIENT
Start: 2021-03-09 | End: 2021-08-31

## 2021-03-09 NOTE — PROGRESS NOTES
Jean-Claude Eagle (:  1955) is a 63 y.o. female,Established patient, here for evaluation of the following chief complaint(s):  Diabetes      ASSESSMENT/PLAN:  1. Type 2 diabetes mellitus without complication, without long-term current use of insulin ([address]  Fully controlled. Discussed dietary management. Recommend increasing Metformin. Recommend she establish with an eye doctor for routine exams  -     POCT glycosylated hemoglobin (Hb A1C)  2. Hyperlipidemia, unspecified hyperlipidemia type  Any statin therapy  -     Lipid Panel  3. Essential hypertension  At goal, continue current medication therapy  -     Comprehensive Metabolic Panel  4. Tobacco abuse  Cessation strongly advised. Not ready to quit at this time. CT lung screening is up-to-date  5. Tremor  With neurology as planned  6. Chronic bronchitis, unspecified chronic bronchitis type ([address]  Stable on present management, continue  7. Chronic GERD  New current medications. Discussed dietary measures. Recommend evaluation with gastroenterology. Questionable history of paresis given her diabetes and symptoms reported  -     [facility] - Ariana Rollins MD, Gastroenterology, [facility]      Return in about 3 months (around 2021). SUBJECTIVE/OBJECTIVE:  HPI   Presents for follow-up care. She has a history of schizophrenia bipolar disorder. She follows with Dr. Dawood De La Fuente. She sees him every few months. She reports her mood is overall stable. She has been following with neurology at [facility] for history of a tremor that is thought to be related to her psychiatric medications. Unfortunately, nothing has helped in order to control the tremor. She is a current smoker. She has a history of COPD currently on Anoro. She feels that her breathing has been stable. Rare albuterol use. No ongoing cough or shortness of breath that is new. . She is still working on smoking cessation but not ready to quit at this time.     She has GERD managed with PPI.  States it is uncontrolled however. She reports it constantly burns and she feels it coming up in her mouth and her throat. She is had this for many years. She has never seen GI or had an EGD, was referred previously. Would like another referral.     she has hypertension managed with ACE inhibitor and hydrochlorothiazide. No recent chest pain, edema, lightheadedness, dizziness. She also takes metformin for type 2 diabetes mellitus. Last A1c was 7.5 and it is 8.2 today. She has a hard time following her diet. She is on statin therapy with abdominal pain or myalgias. She is not following with an eye doctor regularly. No neuropathy. Review of Systems   Respiratory: Negative for cough, shortness of breath and wheezing. Cardiovascular: Negative for chest pain. Gastrointestinal: Positive for nausea and vomiting. Negative for abdominal pain, blood in stool, constipation and diarrhea. Neurological: Negative for dizziness, light-headedness, numbness and headaches. Psychiatric/Behavioral: Negative for dysphoric mood and suicidal ideas. The patient is not nervous/anxious. Physical Exam  Constitutional:       Appearance: Normal appearance. HENT:      Head: Normocephalic and atraumatic. Cardiovascular:      Rate and Rhythm: Normal rate and regular rhythm. Heart sounds: No murmur. Pulmonary:      Effort: Pulmonary effort is normal. No respiratory distress. Breath sounds: Normal breath sounds. No wheezing or rales. Neurological:      General: No focal deficit present. Mental Status: She is alert. Mental status is at baseline. Psychiatric:         Mood and Affect: Mood normal.         Behavior: Behavior normal.         Thought Content: Thought content normal.         Judgment: Judgment normal.              An electronic signature was used to authenticate this note.     --Edilberto Mckeon MD

## 2021-03-10 ASSESSMENT — ENCOUNTER SYMPTOMS
COUGH: 0
BLOOD IN STOOL: 0
SHORTNESS OF BREATH: 0
WHEEZING: 0
NAUSEA: 1
DIARRHEA: 0
VOMITING: 1
ABDOMINAL PAIN: 0
CONSTIPATION: 0

## 2021-03-11 ENCOUNTER — TELEPHONE (OUTPATIENT)
Dept: FAMILY MEDICINE CLINIC | Age: 66
End: 2021-03-11

## 2021-04-17 RX ORDER — VARENICLINE TARTRATE 1 MG/1
TABLET, FILM COATED ORAL
Qty: 60 TABLET | Refills: 2 | Status: SHIPPED | OUTPATIENT
Start: 2021-04-17 | End: 2021-09-01

## 2021-06-02 ENCOUNTER — CLINICAL DOCUMENTATION (OUTPATIENT)
Dept: OTHER | Age: 66
End: 2021-06-02

## 2021-06-07 RX ORDER — PRAVASTATIN SODIUM 10 MG
TABLET ORAL
Qty: 90 TABLET | Refills: 2 | Status: SHIPPED
Start: 2021-06-07 | End: 2021-11-02 | Stop reason: CLARIF

## 2021-06-07 RX ORDER — PANTOPRAZOLE SODIUM 40 MG/1
TABLET, DELAYED RELEASE ORAL
Qty: 90 TABLET | Refills: 2 | Status: SHIPPED
Start: 2021-06-07 | End: 2021-11-02 | Stop reason: CLARIF

## 2021-06-07 RX ORDER — HYDROCHLOROTHIAZIDE 25 MG/1
TABLET ORAL
Qty: 90 TABLET | Refills: 2 | Status: SHIPPED | OUTPATIENT
Start: 2021-06-07 | End: 2021-06-16

## 2021-06-07 RX ORDER — FAMOTIDINE 20 MG/1
TABLET, FILM COATED ORAL
Qty: 60 TABLET | Refills: 2 | Status: SHIPPED | OUTPATIENT
Start: 2021-06-07 | End: 2021-09-07

## 2021-06-11 ENCOUNTER — TELEPHONE (OUTPATIENT)
Dept: FAMILY MEDICINE CLINIC | Age: 66
End: 2021-06-11

## 2021-06-11 NOTE — TELEPHONE ENCOUNTER
PT called in stating that she's been retaining a lot of water and her water pills have not been helping. Pt would like to know if Dr. April Madrid can call something else in for her. Please send to the [facility] on [address].

## 2021-06-16 ENCOUNTER — OFFICE VISIT (OUTPATIENT)
Dept: FAMILY MEDICINE CLINIC | Age: 66
End: 2021-06-16
Payer: MEDICARE

## 2021-06-16 VITALS
WEIGHT: 271 LBS | RESPIRATION RATE: 16 BRPM | SYSTOLIC BLOOD PRESSURE: 138 MMHG | TEMPERATURE: 97.2 F | HEIGHT: 63 IN | BODY MASS INDEX: 48.02 KG/M2 | DIASTOLIC BLOOD PRESSURE: 72 MMHG | OXYGEN SATURATION: 96 % | HEART RATE: 74 BPM

## 2021-06-16 DIAGNOSIS — M79.89 LEG SWELLING: Primary | ICD-10-CM

## 2021-06-16 DIAGNOSIS — R06.09 DYSPNEA ON EXERTION: ICD-10-CM

## 2021-06-16 LAB
ANION GAP SERPL CALCULATED.3IONS-SCNC: 14 MMOL/L (ref 3–16)
BASOPHILS ABSOLUTE: 0.1 K/UL (ref 0–0.2)
BASOPHILS RELATIVE PERCENT: 0.7 %
BUN BLDV-MCNC: 18 MG/DL (ref 7–20)
CALCIUM SERPL-MCNC: 9.6 MG/DL (ref 8.3–10.6)
CHLORIDE BLD-SCNC: 102 MMOL/L (ref 99–110)
CO2: 25 MMOL/L (ref 21–32)
CREAT SERPL-MCNC: 1.1 MG/DL (ref 0.6–1.2)
EOSINOPHILS ABSOLUTE: 0.2 K/UL (ref 0–0.6)
EOSINOPHILS RELATIVE PERCENT: 3 %
GFR AFRICAN AMERICAN: >60
GFR NON-AFRICAN AMERICAN: 50
GLUCOSE BLD-MCNC: 235 MG/DL (ref 70–99)
HCT VFR BLD CALC: 37.2 % (ref 36–48)
HEMOGLOBIN: 12.4 G/DL (ref 12–16)
LYMPHOCYTES ABSOLUTE: 2.4 K/UL (ref 1–5.1)
LYMPHOCYTES RELATIVE PERCENT: 33.8 %
MCH RBC QN AUTO: 28.8 PG (ref 26–34)
MCHC RBC AUTO-ENTMCNC: 33.4 G/DL (ref 31–36)
MCV RBC AUTO: 86.3 FL (ref 80–100)
MONOCYTES ABSOLUTE: 0.4 K/UL (ref 0–1.3)
MONOCYTES RELATIVE PERCENT: 5.5 %
NEUTROPHILS ABSOLUTE: 4.1 K/UL (ref 1.7–7.7)
NEUTROPHILS RELATIVE PERCENT: 57 %
PDW BLD-RTO: 13.6 % (ref 12.4–15.4)
PLATELET # BLD: 172 K/UL (ref 135–450)
PMV BLD AUTO: 8.8 FL (ref 5–10.5)
POTASSIUM SERPL-SCNC: 5 MMOL/L (ref 3.5–5.1)
PRO-BNP: 76 PG/ML (ref 0–124)
RBC # BLD: 4.31 M/UL (ref 4–5.2)
SODIUM BLD-SCNC: 141 MMOL/L (ref 136–145)
WBC # BLD: 7.2 K/UL (ref 4–11)

## 2021-06-16 PROCEDURE — 99214 OFFICE O/P EST MOD 30 MIN: CPT | Performed by: INTERNAL MEDICINE

## 2021-06-16 PROCEDURE — 36415 COLL VENOUS BLD VENIPUNCTURE: CPT | Performed by: INTERNAL MEDICINE

## 2021-06-16 PROCEDURE — 3017F COLORECTAL CA SCREEN DOC REV: CPT | Performed by: INTERNAL MEDICINE

## 2021-06-16 PROCEDURE — G8427 DOCREV CUR MEDS BY ELIG CLIN: HCPCS | Performed by: INTERNAL MEDICINE

## 2021-06-16 PROCEDURE — 4004F PT TOBACCO SCREEN RCVD TLK: CPT | Performed by: INTERNAL MEDICINE

## 2021-06-16 PROCEDURE — G8417 CALC BMI ABV UP PARAM F/U: HCPCS | Performed by: INTERNAL MEDICINE

## 2021-06-16 RX ORDER — FUROSEMIDE 40 MG/1
40 TABLET ORAL DAILY
Qty: 30 TABLET | Refills: 5 | Status: SHIPPED
Start: 2021-06-16 | End: 2021-11-02 | Stop reason: CLARIF

## 2021-06-16 ASSESSMENT — ENCOUNTER SYMPTOMS
WHEEZING: 1
SHORTNESS OF BREATH: 1

## 2021-06-16 NOTE — PROGRESS NOTES
Dima Bell (:  1955) is a 63 y.o. female,Established patient, here for evaluation of the following chief complaint(s):  Leg Swelling         ASSESSMENT/PLAN:  1. Leg swelling  -     BRAIN NATRIURETIC PEPTIDE (BNP)  -     CBC Auto Differential  -     Basic Metabolic Panel  2. Dyspnea on exertion  -     BRAIN NATRIURETIC PEPTIDE (BNP)  -     CBC Auto Differential  Suspect this is primary related to venous insufficiency. Discussed natural course and treatment options. She would like to try new diuretic as it is quite bothersome to her. She has no significant edema today on exam however. Recommend watching her salt intake. Advised compression stockings. Elevation. We will switch her diuretic and monitor her blood pressure follow-up. Labs today to assess for other etiologies, low suspicion for heart failure. Suspect her dyspnea is related to her COPD. She will monitor her use of albuterol, can consider stepping up her inhaler if uses frequent. We will review this at follow-up as well. Return in about 2 weeks (around 2021), or if symptoms worsen or fail to improve. Subjective   SUBJECTIVE/OBJECTIVE:  HPI   Patient presents for evaluation of new issue. Here for evaluation of LE swelling. States it always \"does this\". Normal in the morning but notices increased swelling throughout the day. No pain in calves. No new sob, cp. Does feel dyspneic when going up stairs - uses her albuterol and this helps. hasnt been walking much due to the cicadas.     Current Outpatient Medications   Medication Sig Dispense Refill    furosemide (LASIX) 40 MG tablet Take 1 tablet by mouth daily 30 tablet 5    Compression Stockings MISC by Does not apply route 1 each 0    pravastatin (PRAVACHOL) 10 MG tablet TAKE ONE TABLET BY MOUTH DAILY 90 tablet 2    pantoprazole (PROTONIX) 40 MG tablet TAKE ONE TABLET BY MOUTH DAILY 90 tablet 2    famotidine (PEPCID) 20 MG tablet TAKE ONE TABLET BY MOUTH TWICE A DAY 60 tablet 2    lisinopril (PRINIVIL;ZESTRIL) 10 MG tablet TAKE ONE TABLET BY MOUTH DAILY 30 tablet 4    oxybutynin (DITROPAN-XL) 10 MG extended release tablet       umeclidinium-vilanterol (ANORO ELLIPTA) 62.5-25 MCG/INH AEPB inhaler Inhale 1 puff into the lungs daily 60 each 5    melatonin 3 MG TABS tablet       ciclopirox (PENLAC) 8 % solution Apply topically nightly. 1 Bottle 5    Incontinence Supply Disposable (Capsule Tech INCONTINENCE PADS) MISC 1 Units by Does not apply route 4 times daily as needed (urinary incontinence) 150 each 5    perphenazine 4 MG tablet Take 4 mg by mouth daily      albuterol sulfate HFA (VENTOLIN HFA) 108 (90 Base) MCG/ACT inhaler Inhale 2 puffs into the lungs every 6 hours as needed for Wheezing 1 Inhaler 5    sertraline (ZOLOFT) 100 MG tablet Take 100 mg by mouth daily       benztropine (COGENTIN) 1 MG tablet Take 1 mg by mouth 2 times daily       OLANZapine (ZYPREXA) 7.5 MG tablet Take 7.5 mg by mouth nightly       zolpidem (AMBIEN) 5 MG tablet Take 5 mg by mouth nightly as needed for Sleep. .      CHANTIX 1 MG tablet TAKE ONE TABLET BY MOUTH TWICE A DAY 60 tablet 2    metFORMIN (GLUCOPHAGE-XR) 500 MG extended release tablet Take 2 tablets by mouth 2 times daily 120 tablet 5    Multiple Vitamin (MULTIVITAMIN) tablet Take 1 tablet by mouth daily (Patient not taking: Reported on 6/16/2021)      tamoxifen (NOLVADEX) 20 MG tablet Take 20 mg by mouth daily  (Patient not taking: Reported on 6/16/2021)       No current facility-administered medications for this visit. Review of Systems   Constitutional: Negative for chills and fever. Respiratory: Positive for shortness of breath and wheezing. Cardiovascular: Positive for leg swelling. Negative for chest pain and palpitations.           Objective    /72   Pulse 74   Temp 97.2 °F (36.2 °C)   Resp 16   Ht 5' 3\" (1.6 m)   Wt 271 lb (122.9 kg)   SpO2 96%   BMI 48.01 kg/m²     Physical Exam  Constitutional:       General: She is not in acute distress. Appearance: Normal appearance. Cardiovascular:      Rate and Rhythm: Normal rate and regular rhythm. Pulses: Normal pulses. Heart sounds: No murmur heard. No gallop. Pulmonary:      Effort: Pulmonary effort is normal. No respiratory distress. Breath sounds: Normal breath sounds. No wheezing or rales. Musculoskeletal:      Right lower leg: No edema. Left lower leg: No edema. Comments: Minor indentations/swelling along sock line   Neurological:      Mental Status: She is alert. An electronic signature was used to authenticate this note.     --Girma Nowak MD

## 2021-06-17 ENCOUNTER — TELEPHONE (OUTPATIENT)
Dept: FAMILY MEDICINE CLINIC | Age: 66
End: 2021-06-17

## 2021-06-30 ENCOUNTER — OFFICE VISIT (OUTPATIENT)
Dept: FAMILY MEDICINE CLINIC | Age: 66
End: 2021-06-30
Payer: MEDICARE

## 2021-06-30 VITALS
TEMPERATURE: 98.2 F | HEART RATE: 79 BPM | RESPIRATION RATE: 16 BRPM | HEIGHT: 63 IN | SYSTOLIC BLOOD PRESSURE: 118 MMHG | OXYGEN SATURATION: 95 % | BODY MASS INDEX: 46.78 KG/M2 | DIASTOLIC BLOOD PRESSURE: 64 MMHG | WEIGHT: 264 LBS

## 2021-06-30 DIAGNOSIS — J42 CHRONIC BRONCHITIS, UNSPECIFIED CHRONIC BRONCHITIS TYPE (HCC): ICD-10-CM

## 2021-06-30 DIAGNOSIS — F20.9 SCHIZOPHRENIA, UNSPECIFIED TYPE (HCC): ICD-10-CM

## 2021-06-30 DIAGNOSIS — M79.89 LEG SWELLING: ICD-10-CM

## 2021-06-30 DIAGNOSIS — E78.5 HYPERLIPIDEMIA, UNSPECIFIED HYPERLIPIDEMIA TYPE: ICD-10-CM

## 2021-06-30 DIAGNOSIS — I10 ESSENTIAL HYPERTENSION: ICD-10-CM

## 2021-06-30 DIAGNOSIS — Z72.0 TOBACCO ABUSE: ICD-10-CM

## 2021-06-30 DIAGNOSIS — E11.9 TYPE 2 DIABETES MELLITUS WITHOUT COMPLICATION, WITHOUT LONG-TERM CURRENT USE OF INSULIN (HCC): Primary | ICD-10-CM

## 2021-06-30 LAB
ANION GAP SERPL CALCULATED.3IONS-SCNC: 12 MMOL/L (ref 3–16)
BUN BLDV-MCNC: 17 MG/DL (ref 7–20)
CALCIUM SERPL-MCNC: 9.5 MG/DL (ref 8.3–10.6)
CHLORIDE BLD-SCNC: 100 MMOL/L (ref 99–110)
CO2: 27 MMOL/L (ref 21–32)
CREAT SERPL-MCNC: 1.2 MG/DL (ref 0.6–1.2)
CREATININE URINE: 105.7 MG/DL (ref 28–259)
GFR AFRICAN AMERICAN: 55
GFR NON-AFRICAN AMERICAN: 45
GLUCOSE BLD-MCNC: 212 MG/DL (ref 70–99)
HBA1C MFR BLD: 8.8 %
MICROALBUMIN UR-MCNC: 1.8 MG/DL
MICROALBUMIN/CREAT UR-RTO: 17 MG/G (ref 0–30)
POTASSIUM SERPL-SCNC: 4.4 MMOL/L (ref 3.5–5.1)
SODIUM BLD-SCNC: 139 MMOL/L (ref 136–145)

## 2021-06-30 PROCEDURE — 99214 OFFICE O/P EST MOD 30 MIN: CPT | Performed by: INTERNAL MEDICINE

## 2021-06-30 PROCEDURE — 4004F PT TOBACCO SCREEN RCVD TLK: CPT | Performed by: INTERNAL MEDICINE

## 2021-06-30 PROCEDURE — 3023F SPIROM DOC REV: CPT | Performed by: INTERNAL MEDICINE

## 2021-06-30 PROCEDURE — G8417 CALC BMI ABV UP PARAM F/U: HCPCS | Performed by: INTERNAL MEDICINE

## 2021-06-30 PROCEDURE — G8926 SPIRO NO PERF OR DOC: HCPCS | Performed by: INTERNAL MEDICINE

## 2021-06-30 PROCEDURE — 36415 COLL VENOUS BLD VENIPUNCTURE: CPT | Performed by: INTERNAL MEDICINE

## 2021-06-30 PROCEDURE — 83036 HEMOGLOBIN GLYCOSYLATED A1C: CPT | Performed by: INTERNAL MEDICINE

## 2021-06-30 PROCEDURE — 3052F HG A1C>EQUAL 8.0%<EQUAL 9.0%: CPT | Performed by: INTERNAL MEDICINE

## 2021-06-30 PROCEDURE — 3017F COLORECTAL CA SCREEN DOC REV: CPT | Performed by: INTERNAL MEDICINE

## 2021-06-30 PROCEDURE — G8427 DOCREV CUR MEDS BY ELIG CLIN: HCPCS | Performed by: INTERNAL MEDICINE

## 2021-06-30 PROCEDURE — 2022F DILAT RTA XM EVC RTNOPTHY: CPT | Performed by: INTERNAL MEDICINE

## 2021-06-30 RX ORDER — PEN NEEDLE, DIABETIC 31 GX5/16"
1 NEEDLE, DISPOSABLE MISCELLANEOUS WEEKLY
Qty: 100 EACH | Refills: 0 | Status: SHIPPED
Start: 2021-06-30 | End: 2021-11-02 | Stop reason: CLARIF

## 2021-06-30 ASSESSMENT — ENCOUNTER SYMPTOMS
VOMITING: 0
COUGH: 0
SHORTNESS OF BREATH: 0
WHEEZING: 0

## 2021-06-30 NOTE — PATIENT INSTRUCTIONS
Patient Education        Learning About Meal Planning for Diabetes  Why plan your meals? Meal planning can be a key part of managing diabetes. Planning meals and snacks with the right balance of carbohydrate, protein, and fat can help you keep your blood sugar at the target level you set with your doctor. You don't have to eat special foods. You can eat what your family eats, including sweets once in a while. But you do have to pay attention to how often you eat and how much you eat of certain foods. You may want to work with a dietitian or a certified diabetes educator. He or she can give you tips and meal ideas and can answer your questions about meal planning. This health professional can also help you reach a healthy weight if that is one of your goals. What plan is right for you? Your dietitian or diabetes educator may suggest that you start with the plate format or carbohydrate counting. The plate format  The plate format is a simple way to help you manage how you eat. You plan meals by learning how much space each food should take on a plate. Using the plate format helps you spread carbohydrate throughout the day. It can make it easier to keep your blood sugar level within your target range. It also helps you see if you're eating healthy portion sizes. To use the plate format, you put non-starchy vegetables on half your plate. Add meat or meat substitutes on one-quarter of the plate. Put a grain or starchy vegetable (such as brown rice or a potato) on the final quarter of the plate. You can add a small piece of fruit and some low-fat or fat-free milk or yogurt, depending on your carbohydrate goal for each meal.  Here are some tips for using the plate format:  · Make sure that you are not using an oversized plate. A 9-inch plate is best. Many restaurants use larger plates. · Get used to using the plate format at home. Then you can use it when you eat out.   · Write down your questions about using the plate format. Talk to your doctor, a dietitian, or a diabetes educator about your concerns. Carbohydrate counting  With carbohydrate counting, you plan meals based on the amount of carbohydrate in each food. Carbohydrate raises blood sugar higher and more quickly than any other nutrient. It is found in desserts, breads and cereals, and fruit. It's also found in starchy vegetables such as potatoes and corn, grains such as rice and pasta, and milk and yogurt. Spreading carbohydrate throughout the day helps keep your blood sugar levels within your target range. Your daily amount depends on several things, including your weight, how active you are, which diabetes medicines you take, and what your goals are for your blood sugar levels. A registered dietitian or diabetes educator can help you plan how much carbohydrate to include in each meal and snack. A guideline for your daily amount of carbohydrate is:  · 45 to 60 grams at each meal. That's about the same as 3 to 4 carbohydrate servings. · 15 to 20 grams at each snack. That's about the same as 1 carbohydrate serving. The Nutrition Facts label on packaged foods tells you how much carbohydrate is in a serving of the food. First, look at the serving size on the food label. Is that the amount you eat in a serving? All of the nutrition information on a food label is based on that serving size. So if you eat more or less than that, you'll need to adjust the other numbers. Total carbohydrate is the next thing you need to look for on the label. If you count carbohydrate servings, one serving of carbohydrate is 15 grams. For foods that don't come with labels, such as fresh fruits and vegetables, you'll need a guide that lists carbohydrate in these foods. Ask your doctor, dietitian, or diabetes educator about books or other nutrition guides you can use.   If you take insulin, you need to know how many grams of carbohydrate are in a meal. This lets you know how much rapid-acting insulin to take before you eat. If you use an insulin pump, you get a constant rate of insulin during the day. So the pump must be programmed at meals to give you extra insulin to cover the rise in blood sugar after meals. When you know how much carbohydrate you will eat, you can take the right amount of insulin. Or, if you always use the same amount of insulin, you need to make sure that you eat the same amount of carbohydrate at meals. If you need more help to understand carbohydrate counting and food labels, ask your doctor, dietitian, or diabetes educator. How can you plan healthy meals? Here are some tips to get started:  · Plan your meals a week at a time. Don't forget to include snacks too. · Use cookbooks or online recipes to plan several main meals. Plan some quick meals for busy nights. You also can double some recipes that freeze well. Then you can save half for other busy nights when you don't have time to cook. · Make sure you have the ingredients you need for your recipes. If you're running low on basic items, put these items on your shopping list too. · List foods that you use to make breakfasts, lunches, and snacks. List plenty of fruits and vegetables. · Post this list on the refrigerator. Add to it as you think of more things you need. · Take the list to the store to do your weekly shopping. Follow-up care is a key part of your treatment and safety. Be sure to make and go to all appointments, and call your doctor if you are having problems. It's also a good idea to know your test results and keep a list of the medicines you take. Where can you learn more? Go to [email or web address] org and sign in to your [employer] account. Enter Y975 in the [facility] box to learn more about \"Learning About Meal Planning for Diabetes. \"     If you do not have an account, please click on the \"Sign Up Now\" link.   Current as of: August 31, 2020               Content Version: 12.9  © 2006-2021 [employer]. Care instructions adapted under license by [employer]. If you have questions about a medical condition or this instruction, always ask your healthcare professional. [address] any warranty or liability for your use of this information. Patient Education   Patient Education        dulaglutide  Pronunciation:  DOO la GLOO tide  Brand:  Trulicity Pen  What is the most important information I should know about dulaglutide? You should not use dulaglutide if you have Multiple Endocrine Neoplasia syndrome type 2 (MEN 2), or a personal or family history of medullary thyroid carcinoma (a type of thyroid cancer). Do not use dulaglutide if you are in a state of diabetic ketoacidosis (call your doctor for treatment). In animal studies, dulaglutide caused thyroid tumors or thyroid cancer. It is not known whether these effects would occur in people using regular doses. Ask your doctor about your risk. Call your doctor at once if you have signs of a thyroid tumor, such as swelling or a lump in your neck, trouble swallowing, a hoarse voice, or shortness of breath. What is dulaglutide? Dulaglutide is used together with diet and exercise to improve blood sugar control in adults with type 2 diabetes mellitus. Dulaglutide is also used to help reduce the risk of serious heart problems such as heart attack or stroke in adults who have type 2 diabetes and heart disease. This medicine is not for treating type 1 diabetes. Dulaglutide may also be used for purposes not listed in this medication guide. What should I discuss with my healthcare provider before using dulaglutide?   You should not use dulaglutide if you are allergic to it, or if you have:  · multiple endocrine neoplasia type 2 (tumors in your glands);  · a personal or family history of medullary thyroid carcinoma (a type of thyroid cancer); or  · diabetic ketoacidosis (call your doctor for treatment). Tell your doctor if you have ever had:  · pancreatitis;  · a stomach or intestinal disorder;  · gastroesophageal reflux disease (GERD) or slow digestion;  · eye problems caused by diabetes (retinopathy);  · liver or kidney disease;  · if you also use insulin or oral diabetes medicine; or  · if you have been sick with vomiting or diarrhea. In animal studies, dulaglutide caused thyroid tumors or thyroid cancer. It is not known whether these effects would occur in people using regular doses. Ask your doctor about your risk. It is not known whether this medicine will harm an unborn baby. Tell your doctor if you are pregnant or plan to become pregnant. It may not be safe to breast-feed while using this medicine. Ask your doctor about any risk. Dulaglutide is not approved for use by anyone younger than 25years old. How should I use dulaglutide? Follow all directions on your prescription label and read all medication guides or instruction sheets. Your doctor may occasionally change your dose. Use the medicine exactly as directed. Dulaglutide is injected under the skin once per week. Use dulaglutide on the same day each week at the same time of day. If you change your dosing day, allow at least 3 days to pass between doses. You may use dulaglutide with or without food. Read and carefully follow any Instructions for Use provided with your medicine. Ask your doctor or pharmacist if you do not understand these instructions. Your healthcare provider will show you where on your body to inject dulaglutide. Use a different place each time you give an injection. Do not inject into the same place two times in a row. You may have low blood sugar (hypoglycemia) and feel very hungry, dizzy, irritable, confused, anxious, or shaky. To quickly treat hypoglycemia, eat or drink a fast-acting source of sugar (fruit juice, hard candy, crackers, raisins, or non-diet soda).   Your doctor may prescribe a glucagon injection kit in case you have severe hypoglycemia. Be sure your family or close friends know how to give you this injection in an emergency. Also watch for signs of high blood sugar (hyperglycemia) such as increased thirst or urination. Blood sugar levels can be affected by stress, illness, surgery, exercise, alcohol use, or skipping meals. Ask your doctor before changing your dose or medication schedule. Each injection pen or prefilled syringe is for one use only. Throw away after one use, even if there is still medicine left inside. Use a puncture-proof \"sharps\" container. Follow state or local laws about how to dispose of this container. Keep it out of the reach of children and pets. Store dulaglutide in the refrigerator, protected from light. Do not use past the expiration date on the medicine label. Do not freeze dulaglutide, and throw away the medicine if it has become frozen. You may also store dulaglutide at room temperature for up to 14 days before use. What happens if I miss a dose? Use the medicine as soon as you can, but skip the missed dose if your next dose is due in less than 3 days. Do not use two doses at one time. Do not use this medicine twice within a 72-hour period. What happens if I overdose? Seek emergency medical attention or call the Poison Help line at 1-652.961.7973. What should I avoid while using dulaglutide? Never share an injection pen or prefilled syringe with another person, even if the needle has been changed. Sharing these devices can allow infections or disease to pass from one person to another. What are the possible side effects of dulaglutide? Stop using dulaglutide and get emergency medical help if you have signs of an allergic reaction: hives; difficult breathing; feeling light-headed; swelling of your face, lips, tongue, or throat.   Call your doctor at once if you have:  · pancreatitis --severe pain in your upper stomach spreading to your back, nausea and vomiting;  · signs of a thyroid tumor --swelling or a lump in your neck, trouble swallowing, a hoarse voice, or if you feel short of breath;  · low blood sugar --headache, hunger, weakness, sweating, confusion, irritability, dizziness, fast heart rate, or feeling jittery; or  · kidney problems --little or no urination, swelling in your feet or ankles, feeling tired or short of breath. Tell your doctor if you are sick with vomiting or diarrhea, or if you are sweating more than usual. You can easily become dehydrated while using dulaglutide. This can lead to kidney failure. Common side effects may include:  · nausea, vomiting, stomach pain;  · diarrhea; or  · loss of appetite. This is not a complete list of side effects and others may occur. Call your doctor for medical advice about side effects. You may report side effects to FDA at 5-418-FDA-5128. What other drugs will affect dulaglutide? Dulaglutide can slow your digestion, and it may take longer for your body to absorb any medicines you take by mouth. Other drugs may affect dulaglutide, including prescription and over-the-counter medicines, vitamins, and herbal products. Tell your doctor about all your current medicines and any medicine you start or stop using. Where can I get more information? Your pharmacist can provide more information about dulaglutide. Remember, keep this and all other medicines out of the reach of children, never share your medicines with others, and use this medication only for the indication prescribed. Every effort has been made to ensure that the information provided by [address] is accurate, up-to-date, and complete, but no guarantee is made to that effect. Drug information contained herein may be time sensitive.  [employer] information has been compiled for use by healthcare practitioners and consumers in the [place] and therefore [employer] does not warrant that uses outside of the [place] are appropriate, unless specifically indicated otherwise. [facility]'s drug information does not endorse drugs, diagnose patients or recommend therapy. [facility] drug information is an informational resource designed to assist licensed healthcare practitioners in caring for their patients and/or to serve consumers viewing this service as a supplement to, and not a substitute for, the expertise, skill, knowledge and judgment of healthcare practitioners. The absence of a warning for a given drug or drug combination in no way should be construed to indicate that the drug or drug combination is safe, effective or appropriate for any given patient. [facility] does not assume any responsibility for any aspect of healthcare administered with the aid of information [facility] provides. The information contained herein is not intended to cover all possible uses, directions, precautions, warnings, drug interactions, allergic reactions, or adverse effects. If you have questions about the drugs you are taking, check with your doctor, nurse or pharmacist.  Copyright 5620-3558 167  Marvin: 4.02. Revision date: 10/8/2020. Care instructions adapted under license by [facility] ([facility]). If you have questions about a medical condition or this instruction, always ask your healthcare professional. [place] [address] any warranty or liability for your use of this information.

## 2021-06-30 NOTE — PROGRESS NOTES
Blood Pressure: 118 mmHg      Is BP treated: Yes      HDL Cholesterol: 37 mg/dL      Total Cholesterol: 180 mg/dL      7. Leg swelling  Improving but does still have issues with intermittent swelling. Discussed other options for treatment and work-up. Discussed potential get an echocardiogram.  Discussed doing a sleep study. She would like to monitor things for now which is reasonable but will call me for any new or worsening symptoms      Return in about 3 months (around 9/30/2021). SUBJECTIVE/OBJECTIVE:  Diabetes  Pertinent negatives for hypoglycemia include no dizziness, headaches or nervousness/anxiousness. Pertinent negatives for diabetes include no chest pain. Presents for follow-up care. She has a history of schizophrenia bipolar disorder. She follows with Dr. Jennifer Snyder. She sees him every few months. She reports her mood is overall stable. She was started on Chantix for smoking cessation. She has been able to cut back from 1 pack a day to 5 to 6 cigarettes/day without any adverse effects including any mood disturbance. She has a history of COPD currently on Anoro. She feels that her breathing has been stable. Rare albuterol use. No ongoing cough. Has been having more dyspnea at her last visit. BNP was normal.  Feels it is better now. She is on Lasix for lower extremity edema which is also improving. She has lost weight in the interim. She does snore. No witnessed apnea. Has never had a sleep study. Has not had an echocardiogram in the past.  No chest pain. she has hypertension managed with ACE inhibitor and furosemide. No recent chest pain, edema, lightheadedness, dizziness. She takes metformin for type 2 diabetes mellitus. A1c has steadily been increasing over the last year. Up to 8.8 today from 8.2 at last visit. She admits to poor dietary compliance. She wants to go outside and walk but feels limited by the weather/eat.  She is on statin therapy with abdominal pain or myalgias. She is not following with an eye doctor regularly. No neuropathy. Review of Systems   Respiratory: Negative for cough, shortness of breath and wheezing. Cardiovascular: Negative for chest pain, palpitations and leg swelling. Gastrointestinal: Negative for vomiting. Musculoskeletal: Negative for myalgias. Neurological: Negative for dizziness, light-headedness, numbness and headaches. Psychiatric/Behavioral: Negative for dysphoric mood and suicidal ideas. The patient is not nervous/anxious. Current Outpatient Medications   Medication Sig Dispense Refill    Dulaglutide 0.75 MG/0.5ML SOPN Inject 0.75 mg into the skin once a week 4 pen 5    Alcohol Swabs (ALCOHOL PREP) PADS 1 Device by Does not apply route once a week 100 each 0    furosemide (LASIX) 40 MG tablet Take 1 tablet by mouth daily 30 tablet 5    Compression Stockings MISC by Does not apply route 1 each 0    pravastatin (PRAVACHOL) 10 MG tablet TAKE ONE TABLET BY MOUTH DAILY 90 tablet 2    pantoprazole (PROTONIX) 40 MG tablet TAKE ONE TABLET BY MOUTH DAILY 90 tablet 2    famotidine (PEPCID) 20 MG tablet TAKE ONE TABLET BY MOUTH TWICE A DAY 60 tablet 2    lisinopril (PRINIVIL;ZESTRIL) 10 MG tablet TAKE ONE TABLET BY MOUTH DAILY 30 tablet 4    oxybutynin (DITROPAN-XL) 10 MG extended release tablet       umeclidinium-vilanterol (ANORO ELLIPTA) 62.5-25 MCG/INH AEPB inhaler Inhale 1 puff into the lungs daily 60 each 5    melatonin 3 MG TABS tablet       ciclopirox (PENLAC) 8 % solution Apply topically nightly.  1 Bottle 5    Incontinence Supply Disposable (Jumptap INCONTINENCE PADS) MISC 1 Units by Does not apply route 4 times daily as needed (urinary incontinence) 150 each 5    Multiple Vitamin (MULTIVITAMIN) tablet Take 1 tablet by mouth daily       perphenazine 4 MG tablet Take 4 mg by mouth daily      albuterol sulfate HFA (VENTOLIN HFA) 108 (90 Base) MCG/ACT inhaler Inhale 2 puffs into the lungs every 6 hours as needed for Wheezing 1 Inhaler 5    sertraline (ZOLOFT) 100 MG tablet Take 100 mg by mouth daily       benztropine (COGENTIN) 1 MG tablet Take 1 mg by mouth 2 times daily       tamoxifen (NOLVADEX) 20 MG tablet Take 20 mg by mouth daily       OLANZapine (ZYPREXA) 7.5 MG tablet Take 7.5 mg by mouth nightly       zolpidem (AMBIEN) 5 MG tablet Take 5 mg by mouth nightly as needed for Sleep. .      CHANTIX 1 MG tablet TAKE ONE TABLET BY MOUTH TWICE A DAY 60 tablet 2    metFORMIN (GLUCOPHAGE-XR) 500 MG extended release tablet Take 2 tablets by mouth 2 times daily 120 tablet 5     No current facility-administered medications for this visit. /64   Pulse 79   Temp 98.2 °F (36.8 °C)   Resp 16   Ht 5' 3\" (1.6 m)   Wt 264 lb (119.7 kg)   SpO2 95%   BMI 46.77 kg/m²     Physical Exam  Constitutional:       Appearance: Normal appearance. HENT:      Head: Normocephalic and atraumatic. Cardiovascular:      Rate and Rhythm: Normal rate and regular rhythm. Heart sounds: No murmur heard. Pulmonary:      Effort: Pulmonary effort is normal. No respiratory distress. Breath sounds: Normal breath sounds. No wheezing or rales. Neurological:      General: No focal deficit present. Mental Status: She is alert. Mental status is at baseline. Psychiatric:         Mood and Affect: Mood normal.         Behavior: Behavior normal.         Thought Content: Thought content normal.         Judgment: Judgment normal.              An electronic signature was used to authenticate this note.     --Pablo Cardona MD

## 2021-07-06 RX ORDER — LISINOPRIL 10 MG/1
TABLET ORAL
Qty: 30 TABLET | Refills: 3 | Status: SHIPPED | OUTPATIENT
Start: 2021-07-06 | End: 2021-10-04

## 2021-08-31 RX ORDER — METFORMIN HYDROCHLORIDE 500 MG/1
TABLET, EXTENDED RELEASE ORAL
Qty: 120 TABLET | Refills: 5 | Status: SHIPPED
Start: 2021-08-31 | End: 2021-11-02 | Stop reason: CLARIF

## 2021-09-01 ENCOUNTER — OFFICE VISIT (OUTPATIENT)
Dept: FAMILY MEDICINE CLINIC | Age: 66
End: 2021-09-01
Payer: MEDICARE

## 2021-09-01 VITALS
TEMPERATURE: 96.8 F | HEART RATE: 87 BPM | SYSTOLIC BLOOD PRESSURE: 129 MMHG | WEIGHT: 251 LBS | BODY MASS INDEX: 44.46 KG/M2 | DIASTOLIC BLOOD PRESSURE: 55 MMHG | OXYGEN SATURATION: 97 %

## 2021-09-01 DIAGNOSIS — Z72.0 TOBACCO ABUSE: ICD-10-CM

## 2021-09-01 DIAGNOSIS — E11.9 TYPE 2 DIABETES MELLITUS WITHOUT COMPLICATION, WITHOUT LONG-TERM CURRENT USE OF INSULIN (HCC): Primary | ICD-10-CM

## 2021-09-01 DIAGNOSIS — E78.5 HYPERLIPIDEMIA, UNSPECIFIED HYPERLIPIDEMIA TYPE: ICD-10-CM

## 2021-09-01 DIAGNOSIS — J42 CHRONIC BRONCHITIS, UNSPECIFIED CHRONIC BRONCHITIS TYPE (HCC): ICD-10-CM

## 2021-09-01 DIAGNOSIS — I10 ESSENTIAL HYPERTENSION: ICD-10-CM

## 2021-09-01 DIAGNOSIS — M79.89 LEG SWELLING: ICD-10-CM

## 2021-09-01 PROCEDURE — G8926 SPIRO NO PERF OR DOC: HCPCS | Performed by: INTERNAL MEDICINE

## 2021-09-01 PROCEDURE — 99214 OFFICE O/P EST MOD 30 MIN: CPT | Performed by: INTERNAL MEDICINE

## 2021-09-01 PROCEDURE — G8417 CALC BMI ABV UP PARAM F/U: HCPCS | Performed by: INTERNAL MEDICINE

## 2021-09-01 PROCEDURE — 3052F HG A1C>EQUAL 8.0%<EQUAL 9.0%: CPT | Performed by: INTERNAL MEDICINE

## 2021-09-01 PROCEDURE — G8427 DOCREV CUR MEDS BY ELIG CLIN: HCPCS | Performed by: INTERNAL MEDICINE

## 2021-09-01 PROCEDURE — 3023F SPIROM DOC REV: CPT | Performed by: INTERNAL MEDICINE

## 2021-09-01 PROCEDURE — 4004F PT TOBACCO SCREEN RCVD TLK: CPT | Performed by: INTERNAL MEDICINE

## 2021-09-01 PROCEDURE — 2022F DILAT RTA XM EVC RTNOPTHY: CPT | Performed by: INTERNAL MEDICINE

## 2021-09-01 PROCEDURE — 3017F COLORECTAL CA SCREEN DOC REV: CPT | Performed by: INTERNAL MEDICINE

## 2021-09-01 RX ORDER — DIVALPROEX SODIUM 500 MG/1
TABLET, DELAYED RELEASE ORAL
COMMUNITY
End: 2021-11-02 | Stop reason: CLARIF

## 2021-09-01 RX ORDER — PROPRANOLOL HYDROCHLORIDE 80 MG/1
CAPSULE, EXTENDED RELEASE ORAL
COMMUNITY
Start: 2021-08-05 | End: 2021-11-02 | Stop reason: CLARIF

## 2021-09-01 RX ORDER — SITAGLIPTIN 100 MG/1
TABLET, FILM COATED ORAL
COMMUNITY
Start: 2021-08-26 | End: 2021-11-02 | Stop reason: CLARIF

## 2021-09-01 RX ORDER — PRIMIDONE 50 MG/1
TABLET ORAL
COMMUNITY
Start: 2021-05-27 | End: 2021-11-02 | Stop reason: CLARIF

## 2021-09-01 RX ORDER — ANASTROZOLE 1 MG/1
1 TABLET ORAL DAILY
COMMUNITY

## 2021-09-01 ASSESSMENT — ENCOUNTER SYMPTOMS
VOMITING: 0
WHEEZING: 0
COUGH: 0
SHORTNESS OF BREATH: 0

## 2021-09-01 NOTE — PROGRESS NOTES
Tip White (:  1955) is a 63 y.o. female,Established patient, here for evaluation of the following chief complaint(s):  Diabetes      ASSESSMENT/PLAN:  1. Type 2 diabetes mellitus without complication, without long-term current use of insulin (HCC)  She will return in 1 month to have her A1c done. Discussed titrating up Trulicity today, she prefers to wait to see results. I do think she would benefit from increasing. She is interested in getting off medication in the future if possible. Plans to continue with weight loss efforts    2. Chronic bronchitis, unspecified chronic bronchitis type ([address]  Stable on present management, continue. 3.Tobacco abuse  Smoking cessation, will get her back on Chantix when the recall is over    4. Essential hypertension  At goal on present management, continue  5. Hyperlipidemia, unspecified hyperlipidemia type  Continue statin therapy  The 10-year ASCVD risk score (Pamela Chapman., et al., 2013) is: 24.3%    Values used to calculate the score:      Age: 63 years      Sex: Female      Is Non- : No      Diabetic: Yes      Tobacco smoker: Yes      Systolic Blood Pressure: 990 mmHg      Is BP treated: Yes      HDL Cholesterol: 37 mg/dL      Total Cholesterol: 180 mg/dL      6. Leg swelling  Improving but does still have issues with intermittent swelling. Discussed options for working up further with echocardiogram.  Discussed doing a sleep study. She would like to continue to monitor things for now which is reasonable but will call me for any new or worsening symptoms      Return in about 3 months (around 2021). SUBJECTIVE/OBJECTIVE:    Presents for follow-up care. She has a history of schizophrenia bipolar disorder. She follows with Dr. Stephenie Lemus. She sees him every few months. She reports her mood is overall stable. She was started on Chantix for smoking cessation and had been doing well but had to stop it due to recall.   She is starting to smoke a bit more she is a bit more anxious and home all the time. She did have her Covid vaccination and did well. Plans to get flu shot this year    She has a history of COPD currently on Anoro. She feels that her breathing has been stable. Rare albuterol use. No ongoing cough. Does have some dyspnea when going upstairs. Still having some lower extremity edema. Has not changed. Seems to improve with elevation. Discussed previously doing echo cardiogram her sleep study, she will continue to monitor and defer for now. No recent chest pain    she has hypertension managed with ACE inhibitor and furosemide. No recent chest pain, edema. States she gets lightheaded upon standing sometimes but this is a longstanding issue for her. Not new. She is following with neurology for tremor in her upper extremities. 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 Not thought to be parkinsonism and she did not respond to levodopa in the past.  They are currently managing her symptoms with primidone and propanolol. Seems to be a bit better. Has f/u. Seeing neurology at [facility]. Not thought to be related to any of her current medications however    She takes metformin for type 2 diabetes mellitus. A1c has steadily been increasing over the last year. 8.8 at last visit. Trulicity was started. She feels it is working well. She is not monitoring her blood sugar but she has lost a significant amount of weight. Not due yet for A1c today. She does follow with her eye doctor. She is on statin therapy with abdominal pain or myalgias. She is not following with an eye doctor regularly. No neuropathy. Taking ACE inhibitor as above. Review of Systems   Respiratory: Negative for cough, shortness of breath and wheezing. Cardiovascular: Negative for chest pain, palpitations and leg swelling. Gastrointestinal: Negative for vomiting. Musculoskeletal: Negative for myalgias. Neurological: Positive for tremors. Negative for dizziness, light-headedness, numbness and headaches. Psychiatric/Behavioral: Negative for dysphoric mood and suicidal ideas. The patient is not nervous/anxious. Current Outpatient Medications   Medication Sig Dispense Refill    primidone (MYSOLINE) 50 MG tablet Week 1-2: 12.5mg at bedtime Week 3-4: 25mg Week 5-6: 37.5mg Week 7-8: 50mg      anastrozole (ARIMIDEX) 1 MG tablet       divalproex (DEPAKOTE) 500 MG DR tablet divalproex sodium 500 MG Delayed Release Oral Tablet    3-4 at bedtime    Inactive      propranolol (INDERAL LA) 80 MG extended release capsule       JANUVIA 100 MG tablet       metFORMIN (GLUCOPHAGE-XR) 500 MG extended release tablet TAKE TWO TABLETS BY MOUTH TWICE A  tablet 5    lisinopril (PRINIVIL;ZESTRIL) 10 MG tablet TAKE ONE TABLET BY MOUTH DAILY 30 tablet 3    Dulaglutide 0.75 MG/0.5ML SOPN Inject 0.75 mg into the skin once a week 4 pen 5    Alcohol Swabs (ALCOHOL PREP) PADS 1 Device by Does not apply route once a week 100 each 0    furosemide (LASIX) 40 MG tablet Take 1 tablet by mouth daily 30 tablet 5    Compression Stockings MISC by Does not apply route 1 each 0    pravastatin (PRAVACHOL) 10 MG tablet TAKE ONE TABLET BY MOUTH DAILY 90 tablet 2    pantoprazole (PROTONIX) 40 MG tablet TAKE ONE TABLET BY MOUTH DAILY 90 tablet 2    famotidine (PEPCID) 20 MG tablet TAKE ONE TABLET BY MOUTH TWICE A DAY 60 tablet 2    oxybutynin (DITROPAN-XL) 10 MG extended release tablet       umeclidinium-vilanterol (ANORO ELLIPTA) 62.5-25 MCG/INH AEPB inhaler Inhale 1 puff into the lungs daily 60 each 5    melatonin 3 MG TABS tablet       ciclopirox (PENLAC) 8 % solution Apply topically nightly.  1 Bottle 5    Incontinence Supply Disposable ([employer] INCONTINENCE PADS) MISC 1 Units by Does not apply route 4 times daily as needed (urinary incontinence) 150 each 5    Multiple Vitamin (MULTIVITAMIN) tablet Take 1 tablet by mouth daily       perphenazine 4 MG tablet Take 4 mg by mouth daily      albuterol sulfate HFA (VENTOLIN HFA) 108 (90 Base) MCG/ACT inhaler Inhale 2 puffs into the lungs every 6 hours as needed for Wheezing 1 Inhaler 5    sertraline (ZOLOFT) 100 MG tablet Take 100 mg by mouth daily       benztropine (COGENTIN) 1 MG tablet Take 1 mg by mouth 2 times daily       tamoxifen (NOLVADEX) 20 MG tablet Take 20 mg by mouth daily       OLANZapine (ZYPREXA) 7.5 MG tablet Take 7.5 mg by mouth nightly       zolpidem (AMBIEN) 5 MG tablet Take 5 mg by mouth nightly as needed for Sleep. .       No current facility-administered medications for this visit. BP (!) 129/55   Pulse 87   Temp 96.8 °F (36 °C)   Wt 251 lb (113.9 kg)   SpO2 97%   BMI 44.46 kg/m²     Physical Exam  Constitutional:       Appearance: Normal appearance. HENT:      Head: Normocephalic and atraumatic. Cardiovascular:      Rate and Rhythm: Normal rate and regular rhythm. Heart sounds: No murmur heard. Pulmonary:      Effort: Pulmonary effort is normal. No respiratory distress. Breath sounds: Normal breath sounds. No wheezing or rales. Neurological:      General: No focal deficit present. Mental Status: She is alert. Mental status is at baseline. Comments: Tremor with rest and intention b/l upper extremities   Psychiatric:         Mood and Affect: Mood normal.         Behavior: Behavior normal.         Thought Content: Thought content normal.         Judgment: Judgment normal.              An electronic signature was used to authenticate this note.     --Freddy Barber MD

## 2021-09-03 ENCOUNTER — TELEPHONE (OUTPATIENT)
Dept: FAMILY MEDICINE CLINIC | Age: 66
End: 2021-09-03

## 2021-09-07 RX ORDER — FAMOTIDINE 20 MG/1
TABLET, FILM COATED ORAL
Qty: 60 TABLET | Refills: 2 | Status: SHIPPED | OUTPATIENT
Start: 2021-09-07

## 2021-09-07 NOTE — TELEPHONE ENCOUNTER
Request famotidine last filled 6/7/21                   Last ov 9/1/21                                                         Next ov not scheduled

## 2021-09-08 RX ORDER — FAMOTIDINE 20 MG/1
TABLET, FILM COATED ORAL
Qty: 60 TABLET | Refills: 2 | OUTPATIENT
Start: 2021-09-08

## 2021-09-08 NOTE — TELEPHONE ENCOUNTER
Medication:   Requested Prescriptions     Pending Prescriptions Disp Refills    famotidine (PEPCID) 20 MG tablet [Pharmacy Med Name: FAMOTIDINE 20 MG TABLET] 60 tablet 2     Sig: TAKE ONE TABLET BY MOUTH TWICE A DAY     Last Filled:      Last appt: 9/1/2021   Next appt: Visit date not found    Last Lipid:   Lab Results   Component Value Date    CHOL 180 03/09/2021    TRIG 240 03/09/2021    HDL 37 03/09/2021    [address] 03/09/2021

## 2021-10-04 RX ORDER — UMECLIDINIUM BROMIDE AND VILANTEROL TRIFENATATE 62.5; 25 UG/1; UG/1
POWDER RESPIRATORY (INHALATION)
Qty: 1 EACH | Refills: 2 | Status: SHIPPED
Start: 2021-10-04 | End: 2021-11-02 | Stop reason: CLARIF

## 2021-10-04 RX ORDER — LISINOPRIL 10 MG/1
TABLET ORAL
Qty: 30 TABLET | Refills: 3 | Status: SHIPPED | OUTPATIENT
Start: 2021-10-04

## 2021-10-20 RX ORDER — SERTRALINE HYDROCHLORIDE 100 MG/1
100 TABLET, FILM COATED ORAL DAILY
Qty: 30 TABLET | Refills: 5 | Status: SHIPPED
Start: 2021-10-20 | End: 2021-11-02 | Stop reason: CLARIF

## 2021-11-02 ENCOUNTER — HOSPITAL ENCOUNTER (EMERGENCY)
Age: 66
Discharge: HOME | End: 2021-11-02
Attending: EMERGENCY MEDICINE
Payer: MEDICARE

## 2021-11-02 VITALS
DIASTOLIC BLOOD PRESSURE: 72 MMHG | SYSTOLIC BLOOD PRESSURE: 128 MMHG | TEMPERATURE: 98.2 F | RESPIRATION RATE: 16 BRPM | OXYGEN SATURATION: 99 % | HEART RATE: 85 BPM

## 2021-11-02 DIAGNOSIS — N39.0 ACUTE URINARY TRACT INFECTION: Primary | ICD-10-CM

## 2021-11-02 DIAGNOSIS — E83.42 HYPOMAGNESEMIA: ICD-10-CM

## 2021-11-02 DIAGNOSIS — R00.0 TACHYCARDIA: ICD-10-CM

## 2021-11-02 DIAGNOSIS — E87.6 HYPOKALEMIA: ICD-10-CM

## 2021-11-02 LAB
ALBUMIN SERPL-MCNC: 3.7 G/DL (ref 3.4–5)
ALP BLD-CCNC: 87 U/L (ref 40–129)
ALT SERPL-CCNC: 17 U/L (ref 10–40)
ANION GAP SERPL CALCULATED.3IONS-SCNC: 13 MMOL/L (ref 3–16)
AST SERPL-CCNC: 21 U/L (ref 15–37)
BACTERIA: ABNORMAL /HPF
BASE EXCESS VENOUS: 0.3 MMOL/L (ref -2–3)
BASOPHILS ABSOLUTE: 0 K/UL (ref 0–0.2)
BASOPHILS RELATIVE PERCENT: 0.6 %
BILIRUB SERPL-MCNC: <0.2 MG/DL (ref 0–1)
BILIRUBIN DIRECT: <0.2 MG/DL (ref 0–0.3)
BILIRUBIN URINE: NEGATIVE
BILIRUBIN, INDIRECT: NORMAL MG/DL (ref 0–1)
BLOOD, URINE: ABNORMAL
BUN BLDV-MCNC: 17 MG/DL (ref 7–20)
CALCIUM SERPL-MCNC: 9.8 MG/DL (ref 8.3–10.6)
CARBOXYHEMOGLOBIN: 1.9 % (ref 0–1.5)
CHLORIDE BLD-SCNC: 110 MMOL/L (ref 99–110)
CLARITY: CLEAR
CO2: 23 MMOL/L (ref 21–32)
COLOR: YELLOW
CREAT SERPL-MCNC: 1 MG/DL (ref 0.6–1.2)
EKG ATRIAL RATE: 118 BPM
EKG DIAGNOSIS: NORMAL
EKG P AXIS: 40 DEGREES
EKG P-R INTERVAL: 150 MS
EKG Q-T INTERVAL: 346 MS
EKG QRS DURATION: 92 MS
EKG QTC CALCULATION (BAZETT): 484 MS
EKG R AXIS: -55 DEGREES
EKG T AXIS: 17 DEGREES
EKG VENTRICULAR RATE: 118 BPM
EOSINOPHILS ABSOLUTE: 0 K/UL (ref 0–0.6)
EOSINOPHILS RELATIVE PERCENT: 0.6 %
EPITHELIAL CELLS, UA: ABNORMAL /HPF (ref 0–5)
GFR AFRICAN AMERICAN: >60
GFR NON-AFRICAN AMERICAN: 56
GLUCOSE BLD-MCNC: 119 MG/DL (ref 70–99)
GLUCOSE URINE: NEGATIVE MG/DL
HCO3 VENOUS: 26.4 MMOL/L (ref 24–28)
HCT VFR BLD CALC: 36.8 % (ref 36–48)
HEMOGLOBIN, VEN, REDUCED: ABNORMAL %
HEMOGLOBIN: 12 G/DL (ref 12–16)
KETONES, URINE: 15 MG/DL
LACTIC ACID: 1.5 MMOL/L (ref 0.4–2)
LEUKOCYTE ESTERASE, URINE: ABNORMAL
LYMPHOCYTES ABSOLUTE: 1.7 K/UL (ref 1–5.1)
LYMPHOCYTES RELATIVE PERCENT: 23.8 %
MAGNESIUM: 1.5 MG/DL (ref 1.8–2.4)
MCH RBC QN AUTO: 28.6 PG (ref 26–34)
MCHC RBC AUTO-ENTMCNC: 32.7 G/DL (ref 31–36)
MCV RBC AUTO: 87.3 FL (ref 80–100)
METHEMOGLOBIN VENOUS: 0.3 % (ref 0–1.5)
MICROSCOPIC EXAMINATION: YES
MONOCYTES ABSOLUTE: 0.5 K/UL (ref 0–1.3)
MONOCYTES RELATIVE PERCENT: 7.2 %
NEUTROPHILS ABSOLUTE: 4.8 K/UL (ref 1.7–7.7)
NEUTROPHILS RELATIVE PERCENT: 67.8 %
NITRITE, URINE: POSITIVE
O2 SAT, VEN: 99 %
PCO2, VEN: 48.2 MMHG (ref 41–51)
PDW BLD-RTO: 14 % (ref 12.4–15.4)
PH UA: 6 (ref 5–8)
PH VENOUS: 7.35 (ref 7.35–7.45)
PLATELET # BLD: 203 K/UL (ref 135–450)
PMV BLD AUTO: 8.5 FL (ref 5–10.5)
PO2, VEN: 112 MMHG (ref 25–40)
POTASSIUM REFLEX MAGNESIUM: 3.3 MMOL/L (ref 3.5–5.1)
PROTEIN UA: NEGATIVE MG/DL
RBC # BLD: 4.22 M/UL (ref 4–5.2)
RBC UA: ABNORMAL /HPF (ref 0–4)
SODIUM BLD-SCNC: 146 MMOL/L (ref 136–145)
SPECIFIC GRAVITY UA: 1.02 (ref 1–1.03)
TCO2 CALC VENOUS: 28 MMOL/L
TOTAL PROTEIN: 6.9 G/DL (ref 6.4–8.2)
TROPONIN: <0.01 NG/ML
URINE REFLEX TO CULTURE: YES
URINE TYPE: ABNORMAL
UROBILINOGEN, URINE: 0.2 E.U./DL
WBC # BLD: 7.1 K/UL (ref 4–11)
WBC UA: ABNORMAL /HPF (ref 0–5)

## 2021-11-02 PROCEDURE — 2580000003 HC RX 258: Performed by: EMERGENCY MEDICINE

## 2021-11-02 PROCEDURE — 80048 BASIC METABOLIC PNL TOTAL CA: CPT

## 2021-11-02 PROCEDURE — 6360000002 HC RX W HCPCS: Performed by: EMERGENCY MEDICINE

## 2021-11-02 PROCEDURE — 96368 THER/DIAG CONCURRENT INF: CPT

## 2021-11-02 PROCEDURE — 96361 HYDRATE IV INFUSION ADD-ON: CPT

## 2021-11-02 PROCEDURE — 96366 THER/PROPH/DIAG IV INF ADDON: CPT

## 2021-11-02 PROCEDURE — 93005 ELECTROCARDIOGRAM TRACING: CPT | Performed by: EMERGENCY MEDICINE

## 2021-11-02 PROCEDURE — 99284 EMERGENCY DEPT VISIT MOD MDM: CPT

## 2021-11-02 PROCEDURE — 81001 URINALYSIS AUTO W/SCOPE: CPT

## 2021-11-02 PROCEDURE — 96365 THER/PROPH/DIAG IV INF INIT: CPT

## 2021-11-02 PROCEDURE — 84484 ASSAY OF TROPONIN QUANT: CPT

## 2021-11-02 PROCEDURE — 96367 TX/PROPH/DG ADDL SEQ IV INF: CPT

## 2021-11-02 PROCEDURE — 2500000003 HC RX 250 WO HCPCS: Performed by: EMERGENCY MEDICINE

## 2021-11-02 PROCEDURE — 83735 ASSAY OF MAGNESIUM: CPT

## 2021-11-02 PROCEDURE — 96376 TX/PRO/DX INJ SAME DRUG ADON: CPT

## 2021-11-02 PROCEDURE — 96372 THER/PROPH/DIAG INJ SC/IM: CPT

## 2021-11-02 PROCEDURE — 82803 BLOOD GASES ANY COMBINATION: CPT

## 2021-11-02 PROCEDURE — 36415 COLL VENOUS BLD VENIPUNCTURE: CPT

## 2021-11-02 PROCEDURE — 83605 ASSAY OF LACTIC ACID: CPT

## 2021-11-02 PROCEDURE — 85025 COMPLETE CBC W/AUTO DIFF WBC: CPT

## 2021-11-02 PROCEDURE — 80076 HEPATIC FUNCTION PANEL: CPT

## 2021-11-02 RX ORDER — OYSTER SHELL CALCIUM WITH VITAMIN D 500; 200 MG/1; [IU]/1
1 TABLET, FILM COATED ORAL 2 TIMES DAILY
COMMUNITY

## 2021-11-02 RX ORDER — OXYBUTYNIN CHLORIDE 5 MG/1
5 TABLET ORAL 2 TIMES DAILY
COMMUNITY

## 2021-11-02 RX ORDER — MIDAZOLAM HYDROCHLORIDE 5 MG/ML
5 INJECTION INTRAMUSCULAR; INTRAVENOUS ONCE
Status: COMPLETED | OUTPATIENT
Start: 2021-11-02 | End: 2021-11-02

## 2021-11-02 RX ORDER — ATORVASTATIN CALCIUM 20 MG/1
20 TABLET, FILM COATED ORAL NIGHTLY
COMMUNITY

## 2021-11-02 RX ORDER — CHOLECALCIFEROL (VITAMIN D3) 1250 MCG
50000 CAPSULE ORAL WEEKLY
COMMUNITY

## 2021-11-02 RX ORDER — POTASSIUM CHLORIDE 7.45 MG/ML
10 INJECTION INTRAVENOUS ONCE
Status: COMPLETED | OUTPATIENT
Start: 2021-11-02 | End: 2021-11-02

## 2021-11-02 RX ORDER — LANOLIN ALCOHOL/MO/W.PET/CERES
1000 CREAM (GRAM) TOPICAL DAILY
COMMUNITY

## 2021-11-02 RX ORDER — MAGNESIUM SULFATE IN WATER 40 MG/ML
2000 INJECTION, SOLUTION INTRAVENOUS ONCE
Status: COMPLETED | OUTPATIENT
Start: 2021-11-02 | End: 2021-11-02

## 2021-11-02 RX ORDER — PROPRANOLOL HYDROCHLORIDE 20 MG/1
40 TABLET ORAL DAILY
COMMUNITY

## 2021-11-02 RX ORDER — MIDAZOLAM HYDROCHLORIDE 1 MG/ML
5 INJECTION, SOLUTION INTRAMUSCULAR; INTRAVENOUS ONCE
Status: DISCONTINUED | OUTPATIENT
Start: 2021-11-02 | End: 2021-11-02 | Stop reason: SDUPTHER

## 2021-11-02 RX ORDER — ACETAMINOPHEN 325 MG/1
650 TABLET ORAL EVERY 4 HOURS PRN
COMMUNITY

## 2021-11-02 RX ORDER — CEPHALEXIN 500 MG/1
500 CAPSULE ORAL 2 TIMES DAILY
Qty: 14 CAPSULE | Refills: 0 | Status: SHIPPED | OUTPATIENT
Start: 2021-11-02 | End: 2021-11-02 | Stop reason: SDUPTHER

## 2021-11-02 RX ORDER — ALBUTEROL SULFATE 90 UG/1
2 AEROSOL, METERED RESPIRATORY (INHALATION) EVERY 6 HOURS PRN
COMMUNITY

## 2021-11-02 RX ORDER — CEPHALEXIN 500 MG/1
500 CAPSULE ORAL 2 TIMES DAILY
Qty: 12 CAPSULE | Refills: 0 | Status: SHIPPED | OUTPATIENT
Start: 2021-11-02 | End: 2021-11-08

## 2021-11-02 RX ORDER — OLANZAPINE 5 MG/1
5 TABLET, ORALLY DISINTEGRATING ORAL ONCE
Status: DISCONTINUED | OUTPATIENT
Start: 2021-11-02 | End: 2021-11-02 | Stop reason: ALTCHOICE

## 2021-11-02 RX ORDER — TRAZODONE HYDROCHLORIDE 50 MG/1
25 TABLET ORAL NIGHTLY PRN
COMMUNITY

## 2021-11-02 RX ORDER — POLYETHYLENE GLYCOL 3350 17 G/17G
17 POWDER, FOR SOLUTION ORAL DAILY
COMMUNITY

## 2021-11-02 RX ORDER — SODIUM CHLORIDE, SODIUM LACTATE, POTASSIUM CHLORIDE, AND CALCIUM CHLORIDE .6; .31; .03; .02 G/100ML; G/100ML; G/100ML; G/100ML
1000 INJECTION, SOLUTION INTRAVENOUS ONCE
Status: COMPLETED | OUTPATIENT
Start: 2021-11-02 | End: 2021-11-02

## 2021-11-02 RX ORDER — CALCIUM CARBONATE 200(500)MG
1 TABLET,CHEWABLE ORAL EVERY 6 HOURS PRN
COMMUNITY

## 2021-11-02 RX ORDER — OLANZAPINE 10 MG/1
10 INJECTION, POWDER, LYOPHILIZED, FOR SOLUTION INTRAMUSCULAR ONCE
Status: COMPLETED | OUTPATIENT
Start: 2021-11-02 | End: 2021-11-02

## 2021-11-02 RX ADMIN — POTASSIUM CHLORIDE 10 MEQ: 7.46 INJECTION, SOLUTION INTRAVENOUS at 13:43

## 2021-11-02 RX ADMIN — POTASSIUM CHLORIDE 10 MEQ: 10 INJECTION, SOLUTION INTRAVENOUS at 15:01

## 2021-11-02 RX ADMIN — SODIUM CHLORIDE, POTASSIUM CHLORIDE, SODIUM LACTATE AND CALCIUM CHLORIDE 1000 ML: 600; 310; 30; 20 INJECTION, SOLUTION INTRAVENOUS at 13:39

## 2021-11-02 RX ADMIN — SODIUM CHLORIDE, POTASSIUM CHLORIDE, SODIUM LACTATE AND CALCIUM CHLORIDE 1000 ML: 600; 310; 30; 20 INJECTION, SOLUTION INTRAVENOUS at 12:08

## 2021-11-02 RX ADMIN — DEXTROSE MONOHYDRATE 1000 MG: 5 INJECTION INTRAVENOUS at 13:59

## 2021-11-02 RX ADMIN — MIDAZOLAM 5 MG: 5 INJECTION INTRAMUSCULAR; INTRAVENOUS at 10:55

## 2021-11-02 RX ADMIN — MAGNESIUM SULFATE HEPTAHYDRATE 2000 MG: 2 INJECTION, SOLUTION INTRAVENOUS at 13:44

## 2021-11-02 RX ADMIN — OLANZAPINE 10 MG: 10 INJECTION, POWDER, FOR SOLUTION INTRAMUSCULAR at 10:54

## 2021-11-02 RX ADMIN — MIDAZOLAM 5 MG: 5 INJECTION INTRAMUSCULAR; INTRAVENOUS at 11:30

## 2021-11-02 NOTE — ED PROVIDER NOTES
[facility]          ATTENDING PHYSICIAN NOTE       Date of evaluation: 11/2/2021    Chief Complaint     Tachycardia (Pt sent to ED from [facility] for tachycardia in 130's at vital check. Pt is on a hold for pyschosis. Staff from [facility] at bedside. ) and Agitation      History of Present Illness     Kacie Nj is a 61 y.o. female who presents with tachycardia. She has a history of schizophrenia and bipolar disorder and was just admitted involuntarily to [facility] yesterday. She was admitted for decompensated schizophrenia and psychosis. Since admission she has apparently been holding her nose. This morning she has been agitated and was then found to have a heart rate in the 140s on routine vitals. Of note, she was accidentally given a dose of Rocephin last night and does have a penicillin allergy however she had no immediate change in condition with it, does not appear short of breath, has not had a rash. I am unable to obtain further history from the patient due to her psychosis. Review of Systems     Unable to obtain due to psychosis    Past Medical, Surgical, Family, and Social History     She has a past medical history of Acid reflux, Bipolar disorder ([address] Cervical polyp, COPD (chronic obstructive pulmonary disease) ([address] Hyperlipidemia, Hypertension, and Schizophrenia ([address] She has a past surgical history that includes Breast surgery; Breast surgery (Right, 12/13/2018); Breast biopsy (Right, 12/13/2018); and Cholecystectomy, laparoscopic (N/A, 2/25/2020). Her family history includes Heart Failure in her mother and sister; Hypertension in her mother; Lung Cancer in her sister; Mult Sclerosis in her father; No Known Problems in her maternal grandfather, maternal grandmother, paternal grandfather, and paternal grandmother; Other in her sister. She reports that she has been smoking cigarettes. She started smoking about 45 years ago.  She has a 40.00 pack-year smoking history. She has never used smokeless tobacco. She reports that she does not drink alcohol and does not use drugs.     Medications     Current Discharge Medication List      CONTINUE these medications which have NOT CHANGED    Details   calcium-vitamin D (OSCAL-500) 500-200 MG-UNIT per tablet Take 1 tablet by mouth 2 times daily      propranolol (INDERAL) 20 MG tablet Take 40 mg by mouth daily      vitamin B-12 (CYANOCOBALAMIN) 1000 MCG tablet Take 1,000 mcg by mouth daily      tiotropium (SPIRIVA RESPIMAT) 2.5 MCG/ACT AERS inhaler Inhale 2 puffs into the lungs daily      polyethylene glycol (GLYCOLAX) 17 g packet Take 17 g by mouth daily      metFORMIN (GLUCOPHAGE) 500 MG tablet Take 500 mg by mouth daily (with breakfast)      Cholecalciferol (VITAMIN D3) 1.25 MG (12386 UT) CAPS Take 50,000 Units by mouth once a week Give on Wednesdays      oxybutynin (DITROPAN) 5 MG tablet Take 5 mg by mouth 2 times daily      atorvastatin (LIPITOR) 20 MG tablet Take 20 mg by mouth nightly      calcium carbonate (TUMS) 500 MG chewable tablet Take 1 tablet by mouth every 6 hours as needed for Heartburn      traZODone (DESYREL) 50 MG tablet Take 25 mg by mouth nightly as needed for Sleep      magnesium hydroxide (MILK OF MAGNESIA) 400 MG/5ML suspension Take 30 mLs by mouth daily as needed for Heartburn      acetaminophen (TYLENOL) 325 MG tablet Take 650 mg by mouth every 4 hours as needed for Pain      albuterol sulfate HFA (VENTOLIN HFA) 108 (90 Base) MCG/ACT inhaler Inhale 2 puffs into the lungs every 6 hours as needed for Wheezing      lisinopril (PRINIVIL;ZESTRIL) 10 MG tablet TAKE ONE TABLET BY MOUTH DAILY  Qty: 30 tablet, Refills: 3      famotidine (PEPCID) 20 MG tablet TAKE ONE TABLET BY MOUTH TWICE A DAY  Qty: 60 tablet, Refills: 2      anastrozole (ARIMIDEX) 1 MG tablet Take 1 mg by mouth daily       Dulaglutide 0.75 MG/0.5ML SOPN Inject 0.75 mg into the skin once a week  Qty: 4 pen, Refills: 5 melatonin 3 MG TABS tablet Take 3 mg by mouth nightly       OLANZapine (ZYPREXA) 7.5 MG tablet Take 7.5 mg by mouth nightly       Compression Stockings MISC by Does not apply route  Qty: 1 each, Refills: 0             Allergies     She is allergic to haldol [haloperidol lactate], morphine, and pcn [penicillins]. Physical Exam     INITIAL VITALS: BP: (!) 139/99, Temp: 97.5 °F (36.4 °C), Pulse: 126, Resp: 18, SpO2: 96 %       General: Restless and agitated but awake and regards, states she does not know her name, continues to hold her nose and shake her head back and forth    Eyes:  Pupils reactive. No discharge from eyes. ENT:  No discharge from nose. OP clear. Neck:  Supple. Pulmonary:   Non-labored breathing. Breath sounds clear bilaterally. Cardiac:  Regular rate and rhythm. No murmurs. Abdomen:  Soft. Non-tender. Non-distended. Skin:  No rash. Neuro: Awake and alert, does not answer orientation questions. Moves all four extremities symmetrically without focal deficit    Extremities:  Warm and perfused. No peripheral edema. Diagnostic Results     LABS:   Please see electronic medical record for laboratory tests performed in the ED. EKG   Please see medical record for specific interval measurements. Impression: Sinus tachycardia at 118 bpm, normal intervals, left axis deviation, poor R wave progression compared to previous EKG, no acute ST or T wave changes compared to EKG from February 24, 2020      RECENT VITALS:  BP: (!) 136/56, Temp: 97.5 °F (36.4 °C), Pulse: 126, Resp: 18     Procedures         ED Course     Nursing Notes, Past Medical Hx, Past Surgical Hx, Social Hx, Allergies, and Family Hx were reviewed.     The patient was given the following medications:  Orders Placed This Encounter   Medications    DISCONTD: OLANZapine zydis (ZYPREXA) disintegrating tablet 5 mg    lactated ringers bolus    OLANZapine (ZYPREXA) injection 10 mg    DISCONTD: midazolam PF (VERSED) injection 5 mg    midazolam (VERSED) injection 5 mg    DISCONTD: midazolam PF (VERSED) injection 5 mg    midazolam (VERSED) injection 5 mg    magnesium sulfate 2000 mg in 50 mL IVPB premix    potassium chloride 10 mEq/100 mL IVPB (Peripheral Line)    potassium chloride 10 mEq/100 mL IVPB (Peripheral Line)    cefTRIAXone (ROCEPHIN) 1000 mg IVPB in 50 mL D5W minibag     Order Specific Question:   Antimicrobial Indications     Answer:   Urinary Tract Infection    lactated ringers bolus    DISCONTD: cephALEXin (KEFLEX) 500 MG capsule     Sig: Take 1 capsule by mouth 2 times daily for 7 days     Dispense:  14 capsule     Refill:  0    cephALEXin (KEFLEX) 500 MG capsule     Sig: Take 1 capsule by mouth 2 times daily for 6 days     Dispense:  12 capsule     Refill:  0       CONSULTS:  None    MEDICAL DECISION MAKING / ASSESSMENT / Dina Ghada is a 61 y.o. female presenting with tachycardia and agitation. The patient was initially given 10 IM of Zyprexa and 5 IM of Versed for chemical restraint as she refused any oral medications as well as IV placement. This resulted in decreased agitation although the patient was still uncooperative and was given an additional 5 mg IM of Versed. IV then placed and she was given a liter fluid for hydration. EKG with no acute changes. CBC with normal white count of 7.1 and hemoglobin of 12. Renal panel with sodium of 146, potassium 3.3, magnesium 1.5. Normal renal function. VBG unremarkable. Lactate normal.  Urinalysis suggestive of urinary tract infection. LFTs normal.  Troponin negative. After 1 L of fluid heart rate was noted to be 100. Given a second liter of fluid for hydration as well as 1 g of ceftriaxone for UTI. Given 2 g of magnesium for hypomagnesemia as well as 20 mEq of potassium chloride for hypokalemia. The patient is stable for transfer back to her psychiatric facility on psychiatric hold, to continue 7 days of Keflex for UTI.   Plan communicated to psychiatric facility with return precautions. Clinical Impression     1. Acute urinary tract infection    2. Tachycardia    3. Hypomagnesemia    4.  Hypokalemia        Disposition     PATIENT REFERRED TO:  Camelia Cordova MD  [address] [place] [address]  [phone]    Call   for follow up    [facility]  [address]  [phone]    If symptoms worsen      DISCHARGE MEDICATIONS:  Current Discharge Medication List      START taking these medications    Details   cephALEXin (KEFLEX) 500 MG capsule Take 1 capsule by mouth 2 times daily for 6 days  Qty: 12 capsule, Refills: 0             DISPOSITION Decision To Discharge 11/02/2021 01:33:34 PM            Miguel A La MD  11/02/21 7944

## 2021-11-02 NOTE — ED NOTES
5 mg versed given IM, IV initiated, urine obtained through straight cath. Prewick placed bed changed,.       Barbara Sauceda RN  11/02/21 9182

## 2021-11-02 NOTE — ED NOTES
Clinical Pharmacy Progress Note  Medication History     Admit Date: 11/2/2021    List of of current medications patient is taking is complete. Home Medication list in EPIC updated to reflect changes noted below. Source of information: [facility] medication MAR    Changes made to medication list:   Medications removed:   · Anoro Ellipta 62.5/25 One puff daily  · Benztropine 1mg BID  · Ciclopirox 8% solution  · Divalproex 500mg DR 1500-2000mg at bedtime  · Furosemide 40mg daily  · Sitagliptan 100mg daily  · MVI daily  · Pantoprazole 40mg daily  · Perphenazine 4mg daily  · Primidone 50mg taper up  · Sertraline 100mg daily  · Tamoxifen 20mg daily   · Zolpidem 5mg QHS prn  · Pravastatin 10mg daily    Medications added:    Propranolol IR 20mg - two tabs daily   Vitamin B12   Spiriva respimat   Miralax   Atorvastatin    Vitamin D weekly   Albuterol PRN   APAP Prn   MOM PRN   Trazodone prn   Tums prn    Medication doses adjusted:    Metformin -- from ER 1000mg BID to IR 500mg daily   Propranolol -- from ER 80mg daily to IR 20mg Two tabs daily   Oxybutynin -- from ER 10mg daily to IR 5mg BID    Other notes:   · Anastrazole 1mg daily -- prescribed for Breast CA, but unclear if patient receiving at [facility] as marked as \"nonformulary\"    Please call with any questions.   Doris Bermudez PharmD., [facility]   11/2/2021 10:54 AM  Wireless: [phone]

## 2021-11-02 NOTE — ED NOTES
Bed: A09-09  Expected date:   Expected time:   Means of arrival:   Comments:  [address] RN  11/02/21 1036

## 2022-04-11 NOTE — TELEPHONE ENCOUNTER
1352- Multip admitted to [facility], ambulatory per services of Dr. Rm for evaluation of pre-eclampsia.  Pt states she was at the clinic today and had 3 elevated blood pressures. Denies any headache or visual problems, denies epigastric pain or increased swelling. No LOF or bloody show, reports good fetal movement.  Discussed plan of care including EFM with NST, routine VS, lab work.  Pt agreeable.  EFM applied and admission assessment completed.    1615- Dr. Rm updated on blood pressures, lab results and patient status. May discharge to home with pre-eclampsia and labor precautions. Follow up Wednesday in [place], sooner if indicated. Discussed plan with pt and spouse- both agree with plan of care.   1625- all discharge instructions with patient and spouse.    [facility]  Past apt: 3/9  No f/u

## 2023-09-25 ENCOUNTER — APPOINTMENT (OUTPATIENT)
Dept: CT IMAGING | Age: 68
End: 2023-09-25
Payer: MEDICARE

## 2023-09-25 ENCOUNTER — HOSPITAL ENCOUNTER (EMERGENCY)
Age: 68
Discharge: HOME | End: 2023-09-25
Payer: MEDICARE

## 2023-09-25 VITALS
BODY MASS INDEX: 44.47 KG/M2 | HEIGHT: 63 IN | HEART RATE: 84 BPM | SYSTOLIC BLOOD PRESSURE: 162 MMHG | RESPIRATION RATE: 16 BRPM | WEIGHT: 251 LBS | OXYGEN SATURATION: 98 % | DIASTOLIC BLOOD PRESSURE: 77 MMHG | TEMPERATURE: 98 F

## 2023-09-25 DIAGNOSIS — R82.71 ASB (ASYMPTOMATIC BACTERIURIA): ICD-10-CM

## 2023-09-25 DIAGNOSIS — R10.13 ABDOMINAL PAIN, EPIGASTRIC: Primary | ICD-10-CM

## 2023-09-25 DIAGNOSIS — Z90.49 STATUS POST CHOLECYSTECTOMY: ICD-10-CM

## 2023-09-25 DIAGNOSIS — R93.89 ABNORMAL FINDING ON CT SCAN: ICD-10-CM

## 2023-09-25 LAB
ALBUMIN SERPL-MCNC: 3.9 G/DL (ref 3.4–5)
ALP SERPL-CCNC: 81 U/L (ref 40–129)
ALT SERPL-CCNC: 9 U/L (ref 10–40)
ANION GAP SERPL CALCULATED.3IONS-SCNC: 7 MMOL/L (ref 3–16)
AST SERPL-CCNC: 10 U/L (ref 15–37)
BACTERIA URNS QL MICRO: ABNORMAL /HPF
BASOPHILS # BLD: 0.1 K/UL (ref 0–0.2)
BASOPHILS NFR BLD: 0.8 %
BILIRUB DIRECT SERPL-MCNC: <0.2 MG/DL (ref 0–0.3)
BILIRUB INDIRECT SERPL-MCNC: ABNORMAL MG/DL (ref 0–1)
BILIRUB SERPL-MCNC: 0.3 MG/DL (ref 0–1)
BILIRUB UR QL STRIP.AUTO: NEGATIVE
BUN SERPL-MCNC: 16 MG/DL (ref 7–20)
CALCIUM SERPL-MCNC: 10 MG/DL (ref 8.3–10.6)
CHLORIDE SERPL-SCNC: 107 MMOL/L (ref 99–110)
CLARITY UR: ABNORMAL
CO2 SERPL-SCNC: 28 MMOL/L (ref 21–32)
COLOR UR: YELLOW
CREAT SERPL-MCNC: 1 MG/DL (ref 0.6–1.2)
DEPRECATED RDW RBC AUTO: 13.9 % (ref 12.4–15.4)
EKG ATRIAL RATE: 72 BPM
EKG DIAGNOSIS: NORMAL
EKG P AXIS: 22 DEGREES
EKG P-R INTERVAL: 192 MS
EKG Q-T INTERVAL: 384 MS
EKG QRS DURATION: 120 MS
EKG QTC CALCULATION (BAZETT): 420 MS
EKG R AXIS: -24 DEGREES
EKG T AXIS: 23 DEGREES
EKG VENTRICULAR RATE: 72 BPM
EOSINOPHIL # BLD: 0.2 K/UL (ref 0–0.6)
EOSINOPHIL NFR BLD: 2.5 %
EPI CELLS #/AREA URNS AUTO: 3 /HPF (ref 0–5)
GFR SERPLBLD CREATININE-BSD FMLA CKD-EPI: >60 ML/MIN/{1.73_M2}
GLUCOSE SERPL-MCNC: 98 MG/DL (ref 70–99)
GLUCOSE UR STRIP.AUTO-MCNC: NEGATIVE MG/DL
HCT VFR BLD AUTO: 36 % (ref 36–48)
HGB BLD-MCNC: 12.4 G/DL (ref 12–16)
HGB UR QL STRIP.AUTO: NEGATIVE
HYALINE CASTS #/AREA URNS AUTO: 1 /LPF (ref 0–8)
KETONES UR STRIP.AUTO-MCNC: NEGATIVE MG/DL
LACTATE BLDV-SCNC: 1.4 MMOL/L (ref 0.4–2)
LEUKOCYTE ESTERASE UR QL STRIP.AUTO: ABNORMAL
LIPASE SERPL-CCNC: 27 U/L (ref 13–60)
LYMPHOCYTES # BLD: 3.2 K/UL (ref 1–5.1)
LYMPHOCYTES NFR BLD: 43.8 %
MCH RBC QN AUTO: 29.5 PG (ref 26–34)
MCHC RBC AUTO-ENTMCNC: 34.4 G/DL (ref 31–36)
MCV RBC AUTO: 85.7 FL (ref 80–100)
MONOCYTES # BLD: 0.7 K/UL (ref 0–1.3)
MONOCYTES NFR BLD: 9 %
NEUTROPHILS # BLD: 3.2 K/UL (ref 1.7–7.7)
NEUTROPHILS NFR BLD: 43.9 %
NITRITE UR QL STRIP.AUTO: NEGATIVE
PH UR STRIP.AUTO: 6.5 [PH] (ref 5–8)
PLATELET # BLD AUTO: 169 K/UL (ref 135–450)
PMV BLD AUTO: 8.2 FL (ref 5–10.5)
POTASSIUM SERPL-SCNC: 4.7 MMOL/L (ref 3.5–5.1)
PROT SERPL-MCNC: 6.3 G/DL (ref 6.4–8.2)
PROT UR STRIP.AUTO-MCNC: NEGATIVE MG/DL
RBC # BLD AUTO: 4.2 M/UL (ref 4–5.2)
RBC CLUMPS #/AREA URNS AUTO: 5 /HPF (ref 0–4)
SODIUM SERPL-SCNC: 142 MMOL/L (ref 136–145)
SP GR UR STRIP.AUTO: 1.02 (ref 1–1.03)
TROPONIN, HIGH SENSITIVITY: 10 NG/L (ref 0–14)
UA COMPLETE W REFLEX CULTURE PNL UR: YES
UA DIPSTICK W REFLEX MICRO PNL UR: YES
URN SPEC COLLECT METH UR: ABNORMAL
UROBILINOGEN UR STRIP-ACNC: 1 E.U./DL
WBC # BLD AUTO: 7.2 K/UL (ref 4–11)
WBC #/AREA URNS AUTO: 93 /HPF (ref 0–5)

## 2023-09-25 PROCEDURE — 99285 EMERGENCY DEPT VISIT HI MDM: CPT

## 2023-09-25 PROCEDURE — 85025 COMPLETE CBC W/AUTO DIFF WBC: CPT

## 2023-09-25 PROCEDURE — 84484 ASSAY OF TROPONIN QUANT: CPT

## 2023-09-25 PROCEDURE — 80076 HEPATIC FUNCTION PANEL: CPT

## 2023-09-25 PROCEDURE — 87186 SC STD MICRODIL/AGAR DIL: CPT

## 2023-09-25 PROCEDURE — 93005 ELECTROCARDIOGRAM TRACING: CPT | Performed by: NURSE PRACTITIONER

## 2023-09-25 PROCEDURE — 80048 BASIC METABOLIC PNL TOTAL CA: CPT

## 2023-09-25 PROCEDURE — 96372 THER/PROPH/DIAG INJ SC/IM: CPT

## 2023-09-25 PROCEDURE — 6360000004 HC RX CONTRAST MEDICATION: Performed by: NURSE PRACTITIONER

## 2023-09-25 PROCEDURE — 6360000002 HC RX W HCPCS: Performed by: NURSE PRACTITIONER

## 2023-09-25 PROCEDURE — 93010 ELECTROCARDIOGRAM REPORT: CPT | Performed by: INTERNAL MEDICINE

## 2023-09-25 PROCEDURE — 2580000003 HC RX 258: Performed by: NURSE PRACTITIONER

## 2023-09-25 PROCEDURE — 74177 CT ABD & PELVIS W/CONTRAST: CPT

## 2023-09-25 PROCEDURE — 87088 URINE BACTERIA CULTURE: CPT

## 2023-09-25 PROCEDURE — 81001 URINALYSIS AUTO W/SCOPE: CPT

## 2023-09-25 PROCEDURE — 83690 ASSAY OF LIPASE: CPT

## 2023-09-25 PROCEDURE — 87086 URINE CULTURE/COLONY COUNT: CPT

## 2023-09-25 PROCEDURE — 83605 ASSAY OF LACTIC ACID: CPT

## 2023-09-25 RX ORDER — ESOMEPRAZOLE MAGNESIUM 40 MG/1
40 CAPSULE, DELAYED RELEASE ORAL
Qty: 7 CAPSULE | Refills: 0 | Status: SHIPPED | OUTPATIENT
Start: 2023-09-25 | End: 2023-10-02

## 2023-09-25 RX ORDER — FAMOTIDINE 20 MG/1
20 TABLET, FILM COATED ORAL 2 TIMES DAILY PRN
Qty: 14 TABLET | Refills: 0 | Status: SHIPPED | OUTPATIENT
Start: 2023-09-25 | End: 2023-09-25

## 2023-09-25 RX ORDER — 0.9 % SODIUM CHLORIDE 0.9 %
1000 INTRAVENOUS SOLUTION INTRAVENOUS ONCE
Status: COMPLETED | OUTPATIENT
Start: 2023-09-25 | End: 2023-09-25

## 2023-09-25 RX ORDER — DICYCLOMINE HYDROCHLORIDE 10 MG/ML
20 INJECTION INTRAMUSCULAR ONCE
Status: COMPLETED | OUTPATIENT
Start: 2023-09-25 | End: 2023-09-25

## 2023-09-25 RX ADMIN — SODIUM CHLORIDE 1000 ML: 9 INJECTION, SOLUTION INTRAVENOUS at 13:26

## 2023-09-25 RX ADMIN — DICYCLOMINE HYDROCHLORIDE 20 MG: 10 INJECTION, SOLUTION INTRAMUSCULAR at 13:15

## 2023-09-25 RX ADMIN — IOPAMIDOL 75 ML: 755 INJECTION, SOLUTION INTRAVENOUS at 13:43

## 2023-09-25 ASSESSMENT — PAIN - FUNCTIONAL ASSESSMENT: PAIN_FUNCTIONAL_ASSESSMENT: NONE - DENIES PAIN

## 2023-09-25 ASSESSMENT — PAIN DESCRIPTION - ORIENTATION: ORIENTATION: UPPER

## 2023-09-25 ASSESSMENT — PAIN SCALES - GENERAL: PAINLEVEL_OUTOF10: 0

## 2023-09-25 ASSESSMENT — PAIN DESCRIPTION - LOCATION: LOCATION: ABDOMEN

## 2023-09-25 NOTE — ED TRIAGE NOTES
Pt arrived to dept via ems. Pt c/oAbdominal Pain (Upper Abdominal pain started recently and increases when pt eats, Hx of hernia.) . Pt awake, alert and oriented x 3. Skin warm and dry/normal color for ethnicity. Resp easy and unlabored. Pt  on cardiac monitor. Call light in reach.   Hx of acid reflex and Bipolar/dementia

## 2023-09-25 NOTE — ED NOTES
[facility] Report given to [facility]. All questions answered at this time. Pt transported in stable condition.       Reyes Sullivan RN  09/25/23 3119

## 2023-09-25 NOTE — DISCHARGE INSTRUCTIONS
Return to the emergency department worsening symptoms including, but not limited to, developing inability to tolerate food or drink, worsening pain not relieved by medications, fever, chills, sweats, seeing blood in your vomit or stool, other symptoms/concerns. Medications as prescribed. Eat small frequent meals as opposed to large/frequent ones. Sleep with your head up on pillows with your head elevated. Eat at least 2-3 hours before bedtime. Avoid coffee/other caffeinated beverages, chocolates, greasy, spicy, heavy foods and other foods such as pizza that you know because your symptoms to worsen. Follow with your primary care doctor or the facility provider for reevaluation in next 1 day.

## 2023-09-25 NOTE — ED NOTES
Pt ambulated with steady gait to bathroom. Pt back in bed and on cardiac monitor. Call light within reach. Pt denies further needs at this time.       Margy Liu RN  09/25/23 7099

## 2023-09-25 NOTE — ED NOTES
Report given to [facility] PAULINE, all questions answered     Hector Sawant RN  09/25/23 7874

## 2023-09-25 NOTE — ED NOTES
Pt ambulated to bathroom with steady gait to provide urine sample. Urine collected and sent to lab for analysis. Pt back in room and on cardiac monitoring. Pt denies gown. Call light within reach.       Leandra Banegas RN  09/25/23 8764

## 2023-09-26 NOTE — ED PROVIDER NOTES
[facility] [address] [address]        Pt Name: Jean Marie Abernathy  MRN: [id number]  [facility] ([place] 1955  Date of evaluation: 9/25/2023  Provider: ОЛЬГА Acosta - CNP  PCP: Srinivas Carrion MD  Note Started: 10:11 PM EDT 9/25/23      LUCINA. I have evaluated this patient. CHIEF COMPLAINT       Chief Complaint   Patient presents with    Abdominal Pain     Upper Abdominal pain started recently and increases when pt eats, Hx of hernia. HISTORY OF PRESENT ILLNESS: 1 or more Elements     History from : Patient and EMS    Limitations to history : None    Jean Marie Abernathy is a 79 y.o. toxic, well-appearing female in no acute distress who presents to the emerged from from local SNF- [facility] for evaluation of upper abdominal pain described as \"sharp\" rated severity of 10/10. Denies chest pain, nausea, vomiting, dizziness, presyncope, shortness of breath, diaphoresis, fevers or chills, diarrhea, urinary symptoms/retention, other symptoms/concerns. She states her symptoms started last meals. She states she did not pizza last night and had orange juice and coffee this morning. These foods sometimes exacerbates her acid reflux. She states she takes Tums at the facility for discomfort and they do not always give it to her. Nursing Notes were all reviewed and agreed with or any disagreements were addressed in the HPI. REVIEW OF SYSTEMS :      Review of Systems   Constitutional:  Negative for chills, diaphoresis, fatigue and fever. HENT:  Negative for congestion and sore throat. Eyes:  Negative for pain and visual disturbance. Respiratory:  Negative for cough and shortness of breath. Cardiovascular:  Negative for chest pain and leg swelling. Gastrointestinal:  Positive for abdominal pain. Negative for anal bleeding, diarrhea, nausea and vomiting. Genitourinary:  Negative for difficulty urinating, dysuria, frequency and urgency. None    Records Reviewed : None    CC/HPI Summary, DDx, ED Course, and Reassessment: On reassessment the patient is resting company on stretcher with eyes open and with stable vital signs. She endorses resolution of her epigastric abdominal discomfort. Therefore, she will be discharged back to [facility] outpatient follow-up instructions as noted below. Strict return precautions. Patient verbalized understanding is agreeable to plan discharge and follow-up. Disposition Considerations (include 1 Tests not done, Shared Decision Making, Pt Expectation of Test or Tx.):   Discharged      I am the Primary Clinician of Record. FINAL IMPRESSION      1. Abdominal pain, epigastric    2. Status post cholecystectomy    3. ASB (asymptomatic bacteriuria)    4.  Abnormal finding on CT scan          DISPOSITION/PLAN     DISPOSITION Decision to Discharge    PATIENT REFERRED TO:  Nellie Hoyos MD  [address] [place] [address] [phone]    Call in 1 day      [facility] Emergency Department  [address] [place] [id number] [phone]  Go to   As needed      DISCHARGE MEDICATIONS:  Discharge Medication List as of 9/25/2023  6:34 PM        START taking these medications    Details   esomeprazole (NEXIUM) 40 MG delayed release capsule Take 1 capsule by mouth every morning (before breakfast) for 7 days, Disp-7 capsule, R-0Print             DISCONTINUED MEDICATIONS:  Discharge Medication List as of 9/25/2023  6:34 PM        STOP taking these medications       famotidine (PEPCID) 20 MG tablet Comments:   Reason for Stopping:                      (Please note that portions of this note were completed with a voice recognition program.  Efforts were made to edit the dictations but occasionally words are mis-transcribed.)    ОЛЬГА Cabrales CNP (electronically signed)            ОЛЬГА Cabrales CNP  10/01/23 [id number]

## 2023-09-27 LAB
BACTERIA UR CULT: ABNORMAL
ORGANISM: ABNORMAL

## 2023-10-01 ASSESSMENT — ENCOUNTER SYMPTOMS
ANAL BLEEDING: 0
VOMITING: 0
BACK PAIN: 0
NAUSEA: 0
COUGH: 0
EYE PAIN: 0
DIARRHEA: 0
ABDOMINAL PAIN: 1
SORE THROAT: 0
SHORTNESS OF BREATH: 0

## 2024-01-02 ENCOUNTER — HOSPITAL ENCOUNTER (OUTPATIENT)
Dept: WOMENS IMAGING | Age: 69
Discharge: HOME | End: 2024-01-02
Payer: MEDICARE

## 2024-01-02 DIAGNOSIS — Z12.31 BREAST CANCER SCREENING BY MAMMOGRAM: ICD-10-CM

## 2024-01-02 PROCEDURE — 77063 BREAST TOMOSYNTHESIS BI: CPT

## 2024-02-09 NOTE — PROGRESS NOTES
Nursing Home Patient Worksheet-Pre Admission Testing Department  :59  Day of Surgery:  Surgeon: Warren    Arrival Time: 8   Surgery Time:945  Facility:[address] [place] [address]   Nurse or Care provider:Molly- manager   Contact number: [phone]   Fax number: [phone]    Day of Surgery Information  Can Patient sign own consent?   [x] YES    []  NO      H/P: Complete:      [] YES    []  NO    [x]  N/A    Labs:        [] YES    []  NO   [x]  N/A    Transportation confirmed:     [x] YES    []  NO    Will STNA be provided:     [] YES    [x]  NO    Medication Reconciliation Complete:  [x] YES    []  NO    Demographics (Med History) Complete: [] YES    []  NO  To be sent per Molly    Pt. Ambulation Status:  Per Molly, ambulates self, coming via wheelchair  Pt. LOC:AxOx3    PAT INTERVIEW COMPLETE:    [x] YES   []  NO    Additional Notes:  Call placed to daughterSalud [phone] to notify about procedure

## 2024-02-09 NOTE — PROGRESS NOTES
USE INHALER DOS as directed and bring in DOS    [facility] PRE-OPERATIVE INSTRUCTIONS    Day of Procedure: 2/23               Arrival time:   8             Surgery time:945    Take the following medications with a sip of water:  Follow your MD/Surgeons pre-procedure instructions regarding your medications     Do not eat or drink anything after 12:00 midnight prior to your surgery.  This includes water chewing gum, mints and ice chips.   You may brush your teeth and gargle the morning of your surgery, but do not swallow the water     Please see your family doctor/pediatrician for a history and physical and/or concerning medications.   Bring any test results/reports from your physicians office.   If you are under the care of a heart doctor or specialist doctor, please be aware that you may be asked to them for clearance    You may be asked to stop blood thinners such as Coumadin, Plavix, Fragmin, Lovenox, etc., or any anti-inflammatories such as:  Aspirin, Ibuprofen, Advil, Naproxen prior to your surgery.    We also ask that you stop any OTC medications such as fish oil, vitamin E, glucosamine, garlic, Multivitamins, COQ 10, etc.    We ask that you do not smoke 24 hours prior to surgery  We ask that you do not  drink any alcoholic beverages 24 hours prior to surgery     You must make arrangements for a responsible adult to take you home after your surgery.    For your safety you will not be allowed to leave alone or drive yourself home.  Your surgery will be cancelled if you do not have a ride home.     Also for your safety, it is strongly suggested that someone stay with you the first 24 hours after your surgery.     A parent or legal guardian must accompany a child scheduled for surgery and plan to stay at the hospital until the child is discharged.    Please do not bring other children with you.    For your comfort, please wear simple loose fitting clothing to the hospital.  Please do not bring valuables.

## 2024-02-21 ENCOUNTER — ANESTHESIA EVENT (OUTPATIENT)
Dept: ENDOSCOPY | Age: 69
End: 2024-02-21
Payer: MEDICAID

## 2024-02-23 ENCOUNTER — HOSPITAL ENCOUNTER (OUTPATIENT)
Age: 69
Setting detail: OUTPATIENT SURGERY
Discharge: HOME | End: 2024-02-23
Attending: INTERNAL MEDICINE | Admitting: INTERNAL MEDICINE
Payer: MEDICAID

## 2024-02-23 ENCOUNTER — ANESTHESIA (OUTPATIENT)
Dept: ENDOSCOPY | Age: 69
End: 2024-02-23
Payer: MEDICAID

## 2024-02-23 VITALS
HEART RATE: 66 BPM | BODY MASS INDEX: 38.94 KG/M2 | TEMPERATURE: 97.5 F | WEIGHT: 219.8 LBS | RESPIRATION RATE: 14 BRPM | OXYGEN SATURATION: 98 % | DIASTOLIC BLOOD PRESSURE: 68 MMHG | SYSTOLIC BLOOD PRESSURE: 138 MMHG

## 2024-02-23 DIAGNOSIS — K59.00 CONSTIPATION, UNSPECIFIED CONSTIPATION TYPE: ICD-10-CM

## 2024-02-23 DIAGNOSIS — K21.9 GASTROESOPHAGEAL REFLUX DISEASE, UNSPECIFIED WHETHER ESOPHAGITIS PRESENT: ICD-10-CM

## 2024-02-23 LAB
EKG ATRIAL RATE: 104 BPM
EKG DIAGNOSIS: NORMAL
EKG P AXIS: 55 DEGREES
EKG P-R INTERVAL: 170 MS
EKG Q-T INTERVAL: 382 MS
EKG QRS DURATION: 128 MS
EKG QTC CALCULATION (BAZETT): 502 MS
EKG R AXIS: -55 DEGREES
EKG T AXIS: 44 DEGREES
EKG VENTRICULAR RATE: 104 BPM

## 2024-02-23 PROCEDURE — 7100000011 HC PHASE II RECOVERY - ADDTL 15 MIN: Performed by: INTERNAL MEDICINE

## 2024-02-23 PROCEDURE — 7100000001 HC PACU RECOVERY - ADDTL 15 MIN: Performed by: INTERNAL MEDICINE

## 2024-02-23 PROCEDURE — 6370000000 HC RX 637 (ALT 250 FOR IP): Performed by: STUDENT IN AN ORGANIZED HEALTH CARE EDUCATION/TRAINING PROGRAM

## 2024-02-23 PROCEDURE — 2580000003 HC RX 258: Performed by: STUDENT IN AN ORGANIZED HEALTH CARE EDUCATION/TRAINING PROGRAM

## 2024-02-23 PROCEDURE — 93005 ELECTROCARDIOGRAM TRACING: CPT | Performed by: STUDENT IN AN ORGANIZED HEALTH CARE EDUCATION/TRAINING PROGRAM

## 2024-02-23 PROCEDURE — 3700000000 HC ANESTHESIA ATTENDED CARE: Performed by: INTERNAL MEDICINE

## 2024-02-23 PROCEDURE — 94761 N-INVAS EAR/PLS OXIMETRY MLT: CPT

## 2024-02-23 PROCEDURE — 3609010600 HC COLONOSCOPY POLYPECTOMY SNARE/COLD BIOPSY: Performed by: INTERNAL MEDICINE

## 2024-02-23 PROCEDURE — 2709999900 HC NON-CHARGEABLE SUPPLY: Performed by: INTERNAL MEDICINE

## 2024-02-23 PROCEDURE — 7100000000 HC PACU RECOVERY - FIRST 15 MIN: Performed by: INTERNAL MEDICINE

## 2024-02-23 PROCEDURE — 93010 ELECTROCARDIOGRAM REPORT: CPT | Performed by: INTERNAL MEDICINE

## 2024-02-23 PROCEDURE — 3609015300 HC ESOPHAGEAL DILATION MALONEY: Performed by: INTERNAL MEDICINE

## 2024-02-23 PROCEDURE — 7100000010 HC PHASE II RECOVERY - FIRST 15 MIN: Performed by: INTERNAL MEDICINE

## 2024-02-23 PROCEDURE — 88305 TISSUE EXAM BY PATHOLOGIST: CPT

## 2024-02-23 PROCEDURE — 2500000003 HC RX 250 WO HCPCS

## 2024-02-23 PROCEDURE — 94640 AIRWAY INHALATION TREATMENT: CPT

## 2024-02-23 PROCEDURE — 6360000002 HC RX W HCPCS

## 2024-02-23 PROCEDURE — 3700000001 HC ADD 15 MINUTES (ANESTHESIA): Performed by: INTERNAL MEDICINE

## 2024-02-23 RX ORDER — IPRATROPIUM BROMIDE AND ALBUTEROL SULFATE 2.5; .5 MG/3ML; MG/3ML
1 SOLUTION RESPIRATORY (INHALATION)
Status: DISCONTINUED | OUTPATIENT
Start: 2024-02-23 | End: 2024-02-23 | Stop reason: HOSPADM

## 2024-02-23 RX ORDER — SODIUM CHLORIDE 0.9 % (FLUSH) 0.9 %
5-40 SYRINGE (ML) INJECTION EVERY 12 HOURS SCHEDULED
Status: DISCONTINUED | OUTPATIENT
Start: 2024-02-23 | End: 2024-02-23 | Stop reason: HOSPADM

## 2024-02-23 RX ORDER — ONDANSETRON 2 MG/ML
4 INJECTION INTRAMUSCULAR; INTRAVENOUS
Status: DISCONTINUED | OUTPATIENT
Start: 2024-02-23 | End: 2024-02-23 | Stop reason: HOSPADM

## 2024-02-23 RX ORDER — PANTOPRAZOLE SODIUM 40 MG/1
40 TABLET, DELAYED RELEASE ORAL
Qty: 30 TABLET | Refills: 5 | Status: SHIPPED | OUTPATIENT
Start: 2024-02-23

## 2024-02-23 RX ORDER — PROPOFOL 10 MG/ML
INJECTION, EMULSION INTRAVENOUS PRN
Status: DISCONTINUED | OUTPATIENT
Start: 2024-02-23 | End: 2024-02-23 | Stop reason: SDUPTHER

## 2024-02-23 RX ORDER — PROPRANOLOL HYDROCHLORIDE 20 MG/1
20 TABLET ORAL
Status: COMPLETED | OUTPATIENT
Start: 2024-02-23 | End: 2024-02-23

## 2024-02-23 RX ORDER — SODIUM CHLORIDE 0.9 % (FLUSH) 0.9 %
5-40 SYRINGE (ML) INJECTION PRN
Status: DISCONTINUED | OUTPATIENT
Start: 2024-02-23 | End: 2024-02-23 | Stop reason: HOSPADM

## 2024-02-23 RX ORDER — SODIUM CHLORIDE 9 MG/ML
INJECTION, SOLUTION INTRAVENOUS PRN
Status: DISCONTINUED | OUTPATIENT
Start: 2024-02-23 | End: 2024-02-23 | Stop reason: HOSPADM

## 2024-02-23 RX ORDER — LIDOCAINE HYDROCHLORIDE 20 MG/ML
INJECTION, SOLUTION EPIDURAL; INFILTRATION; INTRACAUDAL; PERINEURAL PRN
Status: DISCONTINUED | OUTPATIENT
Start: 2024-02-23 | End: 2024-02-23 | Stop reason: SDUPTHER

## 2024-02-23 RX ORDER — IPRATROPIUM BROMIDE AND ALBUTEROL SULFATE 2.5; .5 MG/3ML; MG/3ML
1 SOLUTION RESPIRATORY (INHALATION)
Status: COMPLETED | OUTPATIENT
Start: 2024-02-23 | End: 2024-02-23

## 2024-02-23 RX ADMIN — PROPOFOL 180 MCG/KG/MIN: 10 INJECTION, EMULSION INTRAVENOUS at 10:42

## 2024-02-23 RX ADMIN — IPRATROPIUM BROMIDE AND ALBUTEROL SULFATE 1 DOSE: 2.5; .5 SOLUTION RESPIRATORY (INHALATION) at 09:48

## 2024-02-23 RX ADMIN — SODIUM CHLORIDE: 9 INJECTION, SOLUTION INTRAVENOUS at 09:26

## 2024-02-23 RX ADMIN — PROPRANOLOL HYDROCHLORIDE 20 MG: 20 TABLET ORAL at 10:22

## 2024-02-23 RX ADMIN — PROPOFOL 150 MG: 10 INJECTION, EMULSION INTRAVENOUS at 10:41

## 2024-02-23 RX ADMIN — LIDOCAINE HYDROCHLORIDE 100 MG: 20 INJECTION, SOLUTION EPIDURAL; INFILTRATION; INTRACAUDAL; PERINEURAL at 10:41

## 2024-02-23 ASSESSMENT — PAIN SCALES - WONG BAKER
WONGBAKER_NUMERICALRESPONSE: 0

## 2024-02-23 ASSESSMENT — PAIN - FUNCTIONAL ASSESSMENT
PAIN_FUNCTIONAL_ASSESSMENT: NONE - DENIES PAIN
PAIN_FUNCTIONAL_ASSESSMENT: 0-10

## 2024-02-23 ASSESSMENT — PAIN SCALES - GENERAL
PAINLEVEL_OUTOF10: 0

## 2024-02-23 ASSESSMENT — LIFESTYLE VARIABLES: SMOKING_STATUS: 1

## 2024-02-23 NOTE — ANESTHESIA POSTPROCEDURE EVALUATION
Department of Anesthesiology  Postprocedure Note    Patient: Ciera Mitchell  MRN: [id number]  YOB: 1955  Date of evaluation: 2/23/2024    Procedure Summary       Date: 02/23/24 Room / Location: [place] [address] / [facility]    Anesthesia Start: 1038 Anesthesia Stop: 1121    Procedures:       COLONOSCOPY POLYPECTOMY SNARE/COLD BIOPSY      ESOPHAGEAL DILATION ERIC Diagnosis:       Gastroesophageal reflux disease, unspecified whether esophagitis present      Constipation, unspecified constipation type      (Gastroesophageal reflux disease, unspecified whether esophagitis present [K21.9])      (Constipation, unspecified constipation type [K59.00])    Surgeons: Ashok Kelley MD Responsible Provider: Man Andrew MD    Anesthesia Type: MAC ASA Status: 3            Anesthesia Type: MAC    Holley Phase I: Holley Score: 10    Holley Phase II: Holley Score: 10    Anesthesia Post Evaluation    Patient location during evaluation: PACU  Patient participation: complete - patient participated  Level of consciousness: awake  Airway patency: patent  Nausea & Vomiting: no nausea and no vomiting  Cardiovascular status: hemodynamically stable and blood pressure returned to baseline  Respiratory status: spontaneous ventilation, nonlabored ventilation and room air  Hydration status: stable  Comments: Ms. Mitchell was seen resting comfortably following her procedure. Will allow patient to become fully alert before return to hospitals for planned discharge home with . No acute concerns.    Pain management: adequate    No notable events documented.

## 2024-02-23 NOTE — OP NOTE
Endoscopy Note    Patient: Ciera Mitchell  : 1955  Acct#:     Procedure: Esophagogastroduodenoscopy  Csaillas dilation   Colonoscopy with snare polypectomy   Colonoscopy with biopsy   Colonoscopy with intubation of the terminal ileum    Date:  2024     Surgeon:  Ashok Kelley MD    Referring Physician:  Dr. Carlin    Indications: This is a 68 y.o. year old female who presents today with chronic reflux for EGD.  Also average risk initial screening colonoscopy    Previous Colonoscopy: NO  Date: N/A  Greater than 3 years: N/A    Anesthesia:  TIVA    Description of Procedure:  Informed consent was obtained from the patient after explanation of indications, benefits and possible risks and complications of the procedure.  The patient was then taken to the endoscopy suite, placed in the left lateral decubitus position and the above IV sedation was administrered.    The Olympus videoendoscope was placed in the patient's mouth and under direct visualization passed into the esophagus. The scope was then advanced to the 2nd portion of the duodenum without difficulty. Views were good, patient toleration was good.  Retroflexion was performed in the stomach.      Findings:  1.  The esophagus showed a stricture of the distal esophagus at the GE junction.  There was pill debris at the GE junction at the stricture.  No reflux esophagitis or Pimentel's.   Casillas dilation performed at 54Fr with mucosal disruption indicative of a successful dilation.   appeared normal without evidence of Pimentel's esophagus or reflux esophagitis.  2.  6cm sliding hiatal hernia without Lorenzo's ulcers  3.  Normal stomach and duodenum.      The scope was then withdrawn back into the stomach, it was decompressed, and the scope was completely withdrawn.    A digital rectal examination was performed and revealed negative without mass, lesions or tenderness.      The Olympus pediatric video colonoscope was placed in the patient's rectum  under digital direction and advanced to the cecum. The cecum was identified by characteristic anatomy and ballottment.  The prep was fair, but with aggressive lavage, good views were obtained.  The ileocecal valve was identified and intubated.  The scope was then withdrawn back through the cecum, ascending, transverse, descending and sigmoid colons.  Careful circumferential examination of the mucosa in these areas was performed. The scope was then withdrawn into the rectum and retroflexed.  The scope was straightened, the colon was decompressed and the scope was withdrawn from the patient.      Findings:  Ileum:  Normal.  Colon:  There was a 12mm polyp identified in the ascending colon and removed completely with cold snare polypectomy down to a clean base confirmed by post-polypectomy photograph.  All polyp tissue sent off for pathologic evaluation.  There was a 6mm polyp identified in the proximal transverse colon and removed completely with cold snare polypectomy down to a clean base confirmed by post-polypectomy photograph.  All polyp tissue sent off for pathologic evaluation.  There was a 3mm polyp identified in the proximal transverse colon and removed completely with cold biopsy polypectomy down to a clean base confirmed by post-polypectomy photograph.  All polyp tissue sent off for pathologic evaluation.  There was a 7mm polyp identified in the descending colon and removed completely with cold snare polypectomy down to a clean base confirmed by post-polypectomy photograph.  All polyp tissue sent off for pathologic evaluation.  There was a 5mm polyp identified in the sigmoid colon and removed completely with cold snare polypectomy down to a clean base confirmed by post-polypectomy photograph.  All polyp tissue sent off for pathologic evaluation.  Retroflexed view of the rectum: Small grade 1 internal hemorrhoids    The patient tolerated the procedure well and was taken to Recovery in good condition.  No

## 2024-02-23 NOTE — DISCHARGE INSTRUCTIONS
Impression:    1. Smooth stricture at GE junction yi dilated at 54 [address]  2. Pill esophagitis at thestricture.  No mass lesion, reflux esophagitis or Pimentel's.   3.  6cm sliding hiatal hernia without Lorenzo's ulcers  4.  Normal stomach and duodenum.  5.  5 polyps removed with largest 12mm.  6.  Small grade 1 internal hemorrhoids      Recommendations:   1.  Clear liquid diet, advance as tolerated.  2.  Start pantoprazole 40mg daily for the distal esophageal stricture.  3.  Repeat colonoscopy in 3 years based on polyps today. I put this in our system.  4.  [facility] 7 days for biopsy results.  If not able to access the patient portal, call our office for instructions.    Ashok Kelley MD  [facility]    Discharge Instructions for Colonoscopy     Colonoscopy is a visual exam of the lining of the large intestine, also called the bowel or colon, with a colonoscope. A colonoscope is a flexible tube with a light and a viewing device. It allows the doctor to view the inside of the colon through a tiny video camera.     Colonoscopy is performed for many reasons: unexplained anemia , pain, diarrhea , bloody stools, cancer screening, among many other reasons.     Complications from a colonoscopy are rare. Some possible serious complications include perforated bowel (which might require surgery) and bleeding (which could require blood transfusion ). Minor complications include bloating, gas, and cramping that can last for 1-2 days after the procedure.     Because air is put into your colon during the procedure, it is normal to pass large amounts of air from your rectum. You may not have a bowel movement for 1-3 days after the procedure.     What You Will Need:  Someone to drive you home after the procedure     Steps to Take:  Home Care -  Rest when you get home.   Because the sedative will make you drowsy, don't drive, operate machinery, or make important decisions the day of the  shortness of breath, chest pain, severe nausea or vomiting     In case of an emergency, call 911 immediately.

## 2024-02-23 NOTE — H&P
Pre-operative History and Physical    Patient: Ciera Mitchell  : 1955  Acct#:     HISTORY OF PRESENT ILLNESS:    The patient is a 68 y.o. female who presents with chronic reflux for EGD.  Also average risk initial screening colonoscopy    Past Medical History:        Diagnosis Date    Acid reflux     Bipolar disorder (HCC)     Cervical polyp     COPD (chronic obstructive pulmonary disease) (HCC)     Hyperlipidemia     Hypertension     Schizophrenia (HCC)       Past Surgical History:        Procedure Laterality Date    BREAST BIOPSY Right 2018    MAMMOGRAM GUIDED NEEDLE LOCALIZED RIGHT BREAST EXCISIONAL BIOPSY performed by Vaishnavi Oliveira MD at [facility] OR    BREAST SURGERY      LEFT BREAST BIOPSY    BREAST SURGERY Right 2018    CHOLECYSTECTOMY, LAPAROSCOPIC N/A 2020    LAPAROSCOPIC CHOLECYSTECTOMY WITH CHOLANGIOGRAMS POSSIBLE OPEN/ C-ARM performed by Ashok Kern MD at [facility] OR      Medications Prior to Admission:   No current facility-administered medications on file prior to encounter.     Current Outpatient Medications on File Prior to Encounter   Medication Sig Dispense Refill    calcium-vitamin D (OSCAL-500) 500-200 MG-UNIT per tablet Take 1 tablet by mouth 2 times daily      propranolol (INDERAL) 20 MG tablet Take 2 tablets by mouth daily      vitamin B-12 (CYANOCOBALAMIN) 1000 MCG tablet Take 1 tablet by mouth daily      tiotropium (SPIRIVA RESPIMAT) 2.5 MCG/ACT AERS inhaler Inhale 2 puffs into the lungs daily      polyethylene glycol (GLYCOLAX) 17 g packet Take 1 packet by mouth daily      metFORMIN (GLUCOPHAGE) 500 MG tablet Take 1 tablet by mouth daily (with breakfast)      Cholecalciferol (VITAMIN D3) 1.25 MG (19310 UT) CAPS Take 1 capsule by mouth once a week Give on       oxybutynin (DITROPAN) 5 MG tablet Take 1 tablet by mouth 2 times daily      atorvastatin (LIPITOR) 20 MG tablet Take 1 tablet by mouth nightly      calcium carbonate (TUMS) 500 MG chewable tablet  Take 1 tablet by mouth every 6 hours as needed for Heartburn      traZODone (DESYREL) 50 MG tablet Take 0.5 tablets by mouth nightly as needed for Sleep      magnesium hydroxide (MILK OF MAGNESIA) 400 MG/5ML suspension Take 30 mLs by mouth daily as needed for Heartburn      acetaminophen (TYLENOL) 325 MG tablet Take 2 tablets by mouth every 4 hours as needed for Pain      albuterol sulfate HFA (VENTOLIN HFA) 108 (90 Base) MCG/ACT inhaler Inhale 2 puffs into the lungs every 6 hours as needed for Wheezing      lisinopril (PRINIVIL;ZESTRIL) 10 MG tablet TAKE ONE TABLET BY MOUTH DAILY (Patient taking differently: Take 2 tablets by mouth daily) 30 tablet 3    anastrozole (ARIMIDEX) 1 MG tablet Take 1 tablet by mouth daily      Compression Stockings MISC by Does not apply route 1 each 0    melatonin 3 MG TABS tablet Take 1 tablet by mouth nightly      OLANZapine (ZYPREXA) 7.5 MG tablet Take 2 tablets by mouth nightly          Allergies:  Haldol [haloperidol lactate], Morphine, and Pcn [penicillins]    Social History:   Social History     Socioeconomic History    Marital status:      Spouse name: Not on file    Number of children: Not on file    Years of education: Not on file    Highest education level: Not on file   Occupational History    Not on file   Tobacco Use    Smoking status: Every Day     Current packs/day: 1.00     Average packs/day: 1 pack/day for 47.7 years (47.7 ttl pk-yrs)     Types: Cigarettes     Start date: 6/21/1976    Smokeless tobacco: Never    Tobacco comments:     Using Chantix She is  trying to stop   Vaping Use    Vaping Use: Never used   Substance and Sexual Activity    Alcohol use: No    Drug use: No    Sexual activity: Not Currently     Partners: Male   Other Topics Concern    Not on file   Social History Narrative    Not on file     Social Determinants of Health     Financial Resource Strain: Not on file   Food Insecurity: Not on file   Transportation Needs: Not on file   Physical

## 2024-02-23 NOTE — ANESTHESIA PRE PROCEDURE
Department of Anesthesiology  Preprocedure Note       Name:  Ciera Mitchell   Age:  68 y.o.  :  1955                                          MRN:  [id number]         Date:  2024      Surgeon: Surgeon(s):  Ashok Kelley MD    Procedure: Procedure(s):  COLONOSCOPY  ESOPHAGOGASTRODUODENOSCOPY    Medications prior to admission:   Prior to Admission medications    Medication Sig Start Date End Date Taking? Authorizing Provider   calcium-vitamin D (OSCAL-500) 500-200 MG-UNIT per tablet Take 1 tablet by mouth 2 times daily    Patti Garcia MD   propranolol (INDERAL) 20 MG tablet Take 2 tablets by mouth daily    Patti Garcia MD   vitamin B-12 (CYANOCOBALAMIN) 1000 MCG tablet Take 1 tablet by mouth daily    Patti Garcia MD   tiotropium (SPIRIVA RESPIMAT) 2.5 MCG/ACT AERS inhaler Inhale 2 puffs into the lungs daily    Patti Garcia MD   polyethylene glycol (GLYCOLAX) 17 g packet Take 1 packet by mouth daily    Patti Garcia MD   metFORMIN (GLUCOPHAGE) 500 MG tablet Take 1 tablet by mouth daily (with breakfast)    Patti Garcia MD   Cholecalciferol (VITAMIN D3) 1.25 MG (32217 UT) CAPS Take 1 capsule by mouth once a week Give on     Patti Garcia MD   oxybutynin (DITROPAN) 5 MG tablet Take 1 tablet by mouth 2 times daily    Patti Garcia MD   atorvastatin (LIPITOR) 20 MG tablet Take 1 tablet by mouth nightly    Patti Garcia MD   calcium carbonate (TUMS) 500 MG chewable tablet Take 1 tablet by mouth every 6 hours as needed for Heartburn    Patti Garcia MD   traZODone (DESYREL) 50 MG tablet Take 0.5 tablets by mouth nightly as needed for Sleep    Patti Garcia MD   magnesium hydroxide (MILK OF MAGNESIA) 400 MG/5ML suspension Take 30 mLs by mouth daily as needed for Heartburn    Patti Garcia MD   acetaminophen (TYLENOL) 325 MG tablet Take 2 tablets by mouth every 4 hours as needed for Pain    Provider

## 2024-09-23 ENCOUNTER — INITIAL CONSULT (OUTPATIENT)
Dept: SURGERY | Age: 69
End: 2024-09-23

## 2024-09-23 VITALS
DIASTOLIC BLOOD PRESSURE: 82 MMHG | BODY MASS INDEX: 38.8 KG/M2 | SYSTOLIC BLOOD PRESSURE: 135 MMHG | HEIGHT: 63 IN | HEART RATE: 80 BPM | WEIGHT: 219 LBS

## 2024-09-23 DIAGNOSIS — L72.3 SEBACEOUS CYST: Primary | ICD-10-CM

## 2024-09-23 RX ORDER — LIDOCAINE HYDROCHLORIDE 20 MG/ML
5 INJECTION, SOLUTION INFILTRATION; PERINEURAL ONCE
Status: COMPLETED | OUTPATIENT
Start: 2024-09-23 | End: 2024-09-23

## 2024-09-23 RX ADMIN — LIDOCAINE HYDROCHLORIDE 1 ML: 20 INJECTION, SOLUTION INFILTRATION; PERINEURAL at 10:51

## 2024-10-08 ENCOUNTER — OFFICE VISIT (OUTPATIENT)
Dept: SURGERY | Age: 69
End: 2024-10-08

## 2024-10-08 VITALS — WEIGHT: 219 LBS | HEIGHT: 63 IN | BODY MASS INDEX: 38.8 KG/M2

## 2024-10-08 DIAGNOSIS — L72.3 SEBACEOUS CYST: Primary | ICD-10-CM

## 2024-10-08 NOTE — PROGRESS NOTES
Ciera Mitchell (:  1955) is a 69 y.o. female,Established patient, here for evaluation of the following chief complaint(s):  Post-Op Check (Suture removal on back)         Assessment & Plan  Sebaceous cyst     Follow up with me as needed                    Subjective   HPI  Doing well from removal of sebaceous cyst from her upper back. Sutures out today. No infection. Follow up with me as needed    Review of Systems       Objective   Physical Exam       Electronically signed by PAUL MAR MD on 10/8/2024 at 9:42 AM        An electronic signature was used to authenticate this note.    --PAUL MAR MD

## 2025-02-04 ENCOUNTER — HOSPITAL ENCOUNTER (OUTPATIENT)
Dept: WOMENS IMAGING | Age: 70
Discharge: HOME | End: 2025-02-04
Payer: MEDICAID

## 2025-02-04 DIAGNOSIS — Z12.31 ENCOUNTER FOR SCREENING MAMMOGRAM FOR MALIGNANT NEOPLASM OF BREAST: ICD-10-CM

## 2025-02-04 PROCEDURE — 77063 BREAST TOMOSYNTHESIS BI: CPT

## (undated) DEVICE — PTFE COATED BLADE 2.75': Brand: MEDLINE

## (undated) DEVICE — TROCAR: Brand: KII SLEEVE

## (undated) DEVICE — COVER LT HNDL BLU PLAS

## (undated) DEVICE — GLOVE ORANGE PI 7   MSG9070

## (undated) DEVICE — ELECTRODE PT RET AD L9FT HI MOIST COND ADH HYDRGEL CORDED

## (undated) DEVICE — ATTACHMENT SMK EVAC FOR ES PNCL ACCUVAC

## (undated) DEVICE — INSUFFLATION NEEDLE TO ESTABLISH PNEUMOPERITONEUM.: Brand: INSUFFLATION NEEDLE

## (undated) DEVICE — SYRINGE MED 30ML STD CLR PLAS LUERLOCK TIP N CTRL DISP

## (undated) DEVICE — Z DUP USE 2257490 ADHESIVE SKIN CLSRE 036ML TPCL 2CTL CNCRLTE HIGH VSCSTY DRMB

## (undated) DEVICE — SUTURE MCRYL SZ 4-0 L18IN ABSRB UD L19MM PS-2 3/8 CIR PRIM Y496G

## (undated) DEVICE — ADTEC SINGLE USE HOOK SCISSORS, SHAFT ONLY, MONOPOLAR, STRAIGHT, WORKING LENGTH: 12 1/4", (310 MM), DIAM. 5 MM, BLUNT/BLUNT, INSULATED, SINGLE ACTION, STERILE, DISPOSABLE, PACKAGE OF 10 PIECES: Brand: AESCULAP

## (undated) DEVICE — Z DUP USE 2149368 FORCEPS BX DIA2.8MM DISP RAD JAW 4

## (undated) DEVICE — SYRINGE MED 10ML LUERLOCK TIP W/O SFTY DISP

## (undated) DEVICE — CHLORAPREP 26ML ORANGE

## (undated) DEVICE — CANISTER, RIGID, 1200CC: Brand: MEDLINE INDUSTRIES, INC.

## (undated) DEVICE — DRAPE MINOR  6IN X 6IN REINFORC FENES ADHES

## (undated) DEVICE — SYRINGE,EAR/ULCER, 2 OZ, STERILE: Brand: MEDLINE

## (undated) DEVICE — GLOVE ORANGE PI 7 1/2   MSG9075

## (undated) DEVICE — SUTURE VCRL SZ 3-0 L27IN ABSRB UD L26MM SH 1/2 CIR J416H

## (undated) DEVICE — DRAPE C ARM UNIV W41XL74IN CLR PLAS XR VELC CLSR POLY STRP

## (undated) DEVICE — CORD ES L10FT MPLR LAP

## (undated) DEVICE — SKIN AFFIX SURG ADHESIVE 72/CS 0.55ML: Brand: MEDLINE

## (undated) DEVICE — LAPAROSCOPY PK

## (undated) DEVICE — KIT OR ROOM TURNOVER W/STRAP

## (undated) DEVICE — TUBING, SUCTION, 1/4" X 12', STRAIGHT: Brand: MEDLINE

## (undated) DEVICE — APPLIER CLP M L L11.4IN DIA10MM ENDOSCP ROT MULT FOR LIG

## (undated) DEVICE — INTENDED FOR TISSUE SEPARATION, AND OTHER PROCEDURES THAT REQUIRE A SHARP SURGICAL BLADE TO PUNCTURE OR CUT.: Brand: BARD-PARKER ® STAINLESS STEEL BLADES

## (undated) DEVICE — SOLUTION IV IRRIG POUR BRL 0.9% SODIUM CHL 2F7124

## (undated) DEVICE — MERCY HEALTH WEST TURNOVER: Brand: MEDLINE INDUSTRIES, INC.

## (undated) DEVICE — Device

## (undated) DEVICE — COVER US PRB W13XL244CM SURGICAL INTRAOPERATIVE W RUBBERBAND

## (undated) DEVICE — TROCAR: Brand: KII SHIELDED BLADED ACCESS SYSTEM

## (undated) DEVICE — BLADE ES ELASTOMERIC COAT INSUL DURABLE BEND UPTO 90DEG

## (undated) DEVICE — SKIN MARKER REGULAR TIP WITH RULER CAP AND LABELS: Brand: DEVON

## (undated) DEVICE — SHEET,DRAPE,53X77,STERILE: Brand: MEDLINE

## (undated) DEVICE — PENCIL ES L3M BTTN SWCH S STL HEX LOK BLDE ELECTRD HOLSTER

## (undated) DEVICE — SUTURE VCRL SZ 4-0 L18IN ABSRB UD L19MM PS-2 3/8 CIR PRIM J496H

## (undated) DEVICE — NEEDLE HYPO 25GA L1.5IN BVL ORIENTED ECLIPSE

## (undated) DEVICE — BITE BLOCK ENDOSCP AD 60 FR W/ ADJ STRP PLAS GRN BLOX

## (undated) DEVICE — MINOR SET UP PK

## (undated) DEVICE — ENDOSCOPY KIT: Brand: MEDLINE INDUSTRIES, INC.

## (undated) DEVICE — [HIGH FLOW INSUFFLATOR,  DO NOT USE IF PACKAGE IS DAMAGED,  KEEP DRY,  KEEP AWAY FROM SUNLIGHT,  PROTECT FROM HEAT AND RADIOACTIVE SOURCES.]: Brand: PNEUMOSURE

## (undated) DEVICE — GOWN SIRUS NONREIN XL W/TWL: Brand: MEDLINE INDUSTRIES, INC.

## (undated) DEVICE — PMI DISPOSABLE PUNCTURE CLOSURE DEVICE / SUTURE GRASPER: Brand: PMI

## (undated) DEVICE — TRAP POLYP ETRAP

## (undated) DEVICE — TISSUE RETRIEVAL SYSTEM: Brand: INZII RETRIEVAL SYSTEM

## (undated) DEVICE — SUTURE SZ 0 27IN 5/8 CIR UR-6  TAPER PT VIOLET ABSRB VICRYL J603H

## (undated) DEVICE — DRAPE,LAP,CHOLE,W/TROUGHS,STERILE: Brand: MEDLINE

## (undated) DEVICE — FORMALIN CLEAR VIAL 20 ML 10%

## (undated) DEVICE — INDICATED FOR USE DURING OPEN AND LAPAROSCOPIC CHOLECYSTECTOMY PROCEDURES TO INJECT RADIOPAQUE MEDIA THROUGH THE CYSTIC DUCT INTO THE BILIARY TREE.: Brand: AEROSTAT®

## (undated) DEVICE — SNARES COLD OVAL 10MM THIN

## (undated) DEVICE — GARMENT COMPR STD FOR 17IN CALF UNIF THER FLOTRN

## (undated) DEVICE — SYRINGE 20ML LL S/C 50